# Patient Record
Sex: FEMALE | Race: AMERICAN INDIAN OR ALASKA NATIVE | HISPANIC OR LATINO | URBAN - METROPOLITAN AREA
[De-identification: names, ages, dates, MRNs, and addresses within clinical notes are randomized per-mention and may not be internally consistent; named-entity substitution may affect disease eponyms.]

---

## 2021-05-06 ENCOUNTER — EMERGENCY (EMERGENCY)
Facility: HOSPITAL | Age: 31
LOS: 1 days | Discharge: HOME | End: 2021-05-06
Attending: EMERGENCY MEDICINE | Admitting: EMERGENCY MEDICINE
Payer: SELF-PAY

## 2021-05-06 VITALS — HEART RATE: 100 BPM | RESPIRATION RATE: 18 BRPM | OXYGEN SATURATION: 100 %

## 2021-05-06 VITALS
HEART RATE: 107 BPM | HEIGHT: 60 IN | SYSTOLIC BLOOD PRESSURE: 126 MMHG | RESPIRATION RATE: 18 BRPM | TEMPERATURE: 98 F | OXYGEN SATURATION: 100 % | DIASTOLIC BLOOD PRESSURE: 80 MMHG | WEIGHT: 293 LBS

## 2021-05-06 DIAGNOSIS — M79.661 PAIN IN RIGHT LOWER LEG: ICD-10-CM
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
## 2021-05-06 DIAGNOSIS — M25.551 PAIN IN RIGHT HIP: ICD-10-CM

## 2021-05-06 DIAGNOSIS — S80.11XA CONTUSION OF RIGHT LOWER LEG, INITIAL ENCOUNTER: ICD-10-CM
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
## 2021-05-06 PROCEDURE — 73552 X-RAY EXAM OF FEMUR 2/>: CPT | Mod: 26,RT

## 2021-05-06 PROCEDURE — 73552 X-RAY EXAM OF FEMUR 2/>: CPT | Mod: 26

## 2021-05-06 PROCEDURE — 99283 EMERGENCY DEPT VISIT LOW MDM: CPT | Mod: 25

## 2021-05-06 PROCEDURE — 73552 X-RAY EXAM OF FEMUR 2/>: CPT

## 2021-05-06 RX ORDER — IBUPROFEN 200 MG
600 TABLET ORAL ONCE
Refills: 0 | Status: COMPLETED | OUTPATIENT
Start: 2021-05-06 | End: 2021-05-06

## 2021-05-06 RX ADMIN — Medication 600 MILLIGRAM(S): at 15:37

## 2021-05-06 NOTE — ED PROVIDER NOTE - PHYSICAL EXAMINATION
CONSTITUTIONAL: Awake, alert and in no apparent distress.  HEENT: Head is atraumatic. Eyes clear bilaterally, normal EOMI. Airway patent.  GASTROINTESTINAL: Abdomen soft, non-tender, no guarding, distension.  MUSCULOSKELETAL: Spine appears normal, no midline spinal tenderness, range of motion is not limited, no muscle or joint tenderness. no bony tenderness. no ecchymoses, swelling to RLE  NEUROLOGICAL: Alert, no focal deficits, no motor or sensory deficits.  SKIN: Skin normal color for race, warm, dry and intact. No evidence of rash.  PSYCHIATRIC: Normal mood and affect. no apparent risk to self or others.

## 2021-05-06 NOTE — ED ADULT TRIAGE NOTE - HEIGHT IN INCHES
Detail Level: Detailed
Add 55971 Cpt? (Important Note: In 2017 The Use Of 02325 Is Being Tracked By Cms To Determine Future Global Period Reimbursement For Global Periods): no
0
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
## 2021-05-06 NOTE — ED PROVIDER NOTE - PATIENT PORTAL LINK FT
You can access the FollowMyHealth Patient Portal offered by [facility] by registering at the following website: [email or web address]. By joining [employer]’s FollowMyHealth portal, you will also be able to view your health information using other applications (apps) compatible with our system.
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
## 2021-05-18 ENCOUNTER — NON-APPOINTMENT (OUTPATIENT)
Age: 31
End: 2021-05-18

## 2021-05-18 ENCOUNTER — TRANSCRIPTION ENCOUNTER (OUTPATIENT)
Age: 31
End: 2021-05-18

## 2021-05-18 ENCOUNTER — APPOINTMENT (OUTPATIENT)
Dept: INTERNAL MEDICINE | Facility: CLINIC | Age: 31
End: 2021-05-18
Payer: COMMERCIAL

## 2021-05-18 VITALS
SYSTOLIC BLOOD PRESSURE: 136 MMHG | OXYGEN SATURATION: 100 % | DIASTOLIC BLOOD PRESSURE: 86 MMHG | BODY MASS INDEX: 57.52 KG/M2 | WEIGHT: 293 LBS | RESPIRATION RATE: 14 BRPM | HEART RATE: 81 BPM | TEMPERATURE: 98 F | HEIGHT: 60 IN

## 2021-05-18 DIAGNOSIS — R87.821: ICD-10-CM

## 2021-05-18 DIAGNOSIS — R87.811 VAGINAL HIGH RISK HUMAN PAPILLOMAVIRUS (HPV) DNA TEST POSITIVE: ICD-10-CM

## 2021-05-18 PROCEDURE — 99385 PREV VISIT NEW AGE 18-39: CPT | Mod: 25,GC

## 2021-05-18 PROCEDURE — 36415 COLL VENOUS BLD VENIPUNCTURE: CPT

## 2021-05-18 PROCEDURE — 99072 ADDL SUPL MATRL&STAF TM PHE: CPT

## 2021-05-19 LAB
ALBUMIN SERPL ELPH-MCNC: 4.2 G/DL
ALP BLD-CCNC: 115 U/L
ALT SERPL-CCNC: 24 U/L
ANION GAP SERPL CALC-SCNC: 15 MMOL/L
AST SERPL-CCNC: 19 U/L
BASOPHILS # BLD AUTO: 0.04 K/UL
BASOPHILS NFR BLD AUTO: 0.4 %
BILIRUB SERPL-MCNC: 0.2 MG/DL
BUN SERPL-MCNC: 13 MG/DL
CALCIUM SERPL-MCNC: 9.5 MG/DL
CHLORIDE SERPL-SCNC: 102 MMOL/L
CHOLEST SERPL-MCNC: 150 MG/DL
CO2 SERPL-SCNC: 22 MMOL/L
CREAT SERPL-MCNC: 0.66 MG/DL
EOSINOPHIL # BLD AUTO: 0.19 K/UL
EOSINOPHIL NFR BLD AUTO: 2 %
ESTIMATED AVERAGE GLUCOSE: 123 MG/DL
GLUCOSE SERPL-MCNC: 80 MG/DL
HBA1C MFR BLD HPLC: 5.9 %
HCT VFR BLD CALC: 36.4 %
HDLC SERPL-MCNC: 55 MG/DL
HGB BLD-MCNC: 10.9 G/DL
IMM GRANULOCYTES NFR BLD AUTO: 0.8 %
LDLC SERPL CALC-MCNC: 80 MG/DL
LYMPHOCYTES # BLD AUTO: 2.46 K/UL
LYMPHOCYTES NFR BLD AUTO: 25.9 %
MAN DIFF?: NORMAL
MCHC RBC-ENTMCNC: 24.1 PG
MCHC RBC-ENTMCNC: 29.9 GM/DL
MCV RBC AUTO: 80.4 FL
MONOCYTES # BLD AUTO: 0.44 K/UL
MONOCYTES NFR BLD AUTO: 4.6 %
NEUTROPHILS # BLD AUTO: 6.3 K/UL
NEUTROPHILS NFR BLD AUTO: 66.3 %
NONHDLC SERPL-MCNC: 95 MG/DL
PLATELET # BLD AUTO: 290 K/UL
POTASSIUM SERPL-SCNC: 4.1 MMOL/L
PROT SERPL-MCNC: 7.4 G/DL
RBC # BLD: 4.53 M/UL
RBC # FLD: 17 %
SODIUM SERPL-SCNC: 139 MMOL/L
TRIGL SERPL-MCNC: 76 MG/DL
WBC # FLD AUTO: 9.51 K/UL

## 2021-06-07 ENCOUNTER — TRANSCRIPTION ENCOUNTER (OUTPATIENT)
Age: 31
End: 2021-06-07

## 2021-06-28 ENCOUNTER — APPOINTMENT (OUTPATIENT)
Dept: INTERNAL MEDICINE | Facility: CLINIC | Age: 31
End: 2021-06-28
Payer: COMMERCIAL

## 2021-06-28 VITALS
DIASTOLIC BLOOD PRESSURE: 83 MMHG | TEMPERATURE: 98.4 F | HEART RATE: 81 BPM | OXYGEN SATURATION: 99 % | SYSTOLIC BLOOD PRESSURE: 151 MMHG

## 2021-06-28 VITALS — SYSTOLIC BLOOD PRESSURE: 152 MMHG | DIASTOLIC BLOOD PRESSURE: 80 MMHG

## 2021-06-28 VITALS — BODY MASS INDEX: 63.47 KG/M2 | WEIGHT: 293 LBS

## 2021-06-28 DIAGNOSIS — A08.4 VIRAL INTESTINAL INFECTION, UNSPECIFIED: ICD-10-CM

## 2021-06-28 DIAGNOSIS — H11.30 CONJUNCTIVAL HEMORRHAGE, UNSPECIFIED EYE: ICD-10-CM

## 2021-06-28 PROCEDURE — 99214 OFFICE O/P EST MOD 30 MIN: CPT | Mod: GC

## 2021-06-28 PROCEDURE — 99072 ADDL SUPL MATRL&STAF TM PHE: CPT

## 2021-07-01 ENCOUNTER — APPOINTMENT (OUTPATIENT)
Dept: BARIATRICS | Facility: CLINIC | Age: 31
End: 2021-07-01
Payer: COMMERCIAL

## 2021-07-01 VITALS — BODY MASS INDEX: 57.52 KG/M2 | WEIGHT: 293 LBS | HEIGHT: 60 IN

## 2021-07-01 DIAGNOSIS — R73.03 PREDIABETES.: ICD-10-CM

## 2021-07-01 DIAGNOSIS — R03.0 ELEVATED BLOOD-PRESSURE READING, W/OUT DIAGNOSIS OF HYPERTENSION: ICD-10-CM

## 2021-07-01 PROCEDURE — 99203 OFFICE O/P NEW LOW 30 MIN: CPT | Mod: 95

## 2021-07-02 ENCOUNTER — TRANSCRIPTION ENCOUNTER (OUTPATIENT)
Age: 31
End: 2021-07-02

## 2021-07-08 ENCOUNTER — APPOINTMENT (OUTPATIENT)
Dept: BARIATRICS | Facility: CLINIC | Age: 31
End: 2021-07-08
Payer: COMMERCIAL

## 2021-07-08 VITALS — BODY MASS INDEX: 62.69 KG/M2 | WEIGHT: 293 LBS

## 2021-07-08 DIAGNOSIS — E66.01 MORBID (SEVERE) OBESITY DUE TO EXCESS CALORIES: ICD-10-CM

## 2021-07-08 PROCEDURE — 97802 MEDICAL NUTRITION INDIV IN: CPT | Mod: 95

## 2021-07-09 PROBLEM — R73.03 PREDIABETES: Status: ACTIVE | Noted: 2021-06-28

## 2021-07-09 PROBLEM — R03.0 ELEVATED BLOOD PRESSURE READING: Status: ACTIVE | Noted: 2021-05-18

## 2021-07-09 NOTE — ASSESSMENT
[FreeTextEntry1] : She meets the NIH criteria for bariatric surgery and would like to have a laparoscopic modified duodenal switch. i have reviewed the risks and benefits of the procedure with the patient, and she understands this information fully.

## 2021-07-09 NOTE — HISTORY OF PRESENT ILLNESS
[Home] : at home, [unfilled] , at the time of the visit. [Other Location: e.g. Home (Enter Location, City,State)___] : at [unfilled] [Verbal consent obtained from patient] : the patient, [unfilled] [de-identified] : Patient is a 30 year old female with a long history of morbid obesity not responsive to multiple dietary regimens. She has a BMI of 62.7 and weight-related comorbidities including prediabetes and elevated blood pressure.

## 2021-07-11 ENCOUNTER — FORM ENCOUNTER (OUTPATIENT)
Age: 31
End: 2021-07-11

## 2021-07-13 ENCOUNTER — APPOINTMENT (OUTPATIENT)
Dept: INTERNAL MEDICINE | Facility: CLINIC | Age: 31
End: 2021-07-13

## 2021-07-20 LAB
BASOPHILS # BLD AUTO: 0.04 K/UL
BASOPHILS NFR BLD AUTO: 0.4 %
EOSINOPHIL # BLD AUTO: 0.3 K/UL
EOSINOPHIL NFR BLD AUTO: 2.8 %
HCT VFR BLD CALC: 36.4 %
HGB BLD-MCNC: 11.3 G/DL
IMM GRANULOCYTES NFR BLD AUTO: 0.6 %
LYMPHOCYTES # BLD AUTO: 2.77 K/UL
LYMPHOCYTES NFR BLD AUTO: 25.7 %
MAN DIFF?: NORMAL
MCHC RBC-ENTMCNC: 24.5 PG
MCHC RBC-ENTMCNC: 31 GM/DL
MCV RBC AUTO: 79 FL
MONOCYTES # BLD AUTO: 0.51 K/UL
MONOCYTES NFR BLD AUTO: 4.7 %
NEUTROPHILS # BLD AUTO: 7.1 K/UL
NEUTROPHILS NFR BLD AUTO: 65.8 %
PLATELET # BLD AUTO: 302 K/UL
RBC # BLD: 4.61 M/UL
RBC # FLD: 17.2 %
WBC # FLD AUTO: 10.79 K/UL

## 2021-07-21 LAB
25(OH)D3 SERPL-MCNC: 14.6 NG/ML
ALBUMIN SERPL ELPH-MCNC: 4.4 G/DL
ALP BLD-CCNC: 115 U/L
ALT SERPL-CCNC: 18 U/L
ANION GAP SERPL CALC-SCNC: 12 MMOL/L
AST SERPL-CCNC: 13 U/L
BILIRUB SERPL-MCNC: 0.2 MG/DL
BUN SERPL-MCNC: 11 MG/DL
CALCIUM SERPL-MCNC: 9.5 MG/DL
CHLORIDE SERPL-SCNC: 103 MMOL/L
CO2 SERPL-SCNC: 24 MMOL/L
CREAT SERPL-MCNC: 0.73 MG/DL
ESTIMATED AVERAGE GLUCOSE: 120 MG/DL
FOLATE SERPL-MCNC: 4.7 NG/ML
GLUCOSE SERPL-MCNC: 92 MG/DL
HBA1C MFR BLD HPLC: 5.8 %
IRON SERPL-MCNC: 32 UG/DL
POTASSIUM SERPL-SCNC: 4 MMOL/L
PROT SERPL-MCNC: 7.2 G/DL
SODIUM SERPL-SCNC: 139 MMOL/L
TSH SERPL-ACNC: 3.43 UIU/ML
VIT B12 SERPL-MCNC: 465 PG/ML

## 2021-07-23 LAB — VIT A SERPL-MCNC: 32.4 UG/DL

## 2021-07-24 LAB — VIT B1 SERPL-MCNC: 113.6 NMOL/L

## 2021-07-27 ENCOUNTER — NON-APPOINTMENT (OUTPATIENT)
Age: 31
End: 2021-07-27

## 2021-08-11 ENCOUNTER — APPOINTMENT (OUTPATIENT)
Dept: RADIOLOGY | Facility: HOSPITAL | Age: 31
End: 2021-08-11
Payer: COMMERCIAL

## 2021-08-11 ENCOUNTER — RESULT REVIEW (OUTPATIENT)
Age: 31
End: 2021-08-11

## 2021-08-11 ENCOUNTER — OUTPATIENT (OUTPATIENT)
Dept: OUTPATIENT SERVICES | Facility: HOSPITAL | Age: 31
LOS: 1 days | End: 2021-08-11
Payer: COMMERCIAL

## 2021-08-11 ENCOUNTER — APPOINTMENT (OUTPATIENT)
Dept: BARIATRICS | Facility: CLINIC | Age: 31
End: 2021-08-11
Payer: COMMERCIAL

## 2021-08-11 VITALS
DIASTOLIC BLOOD PRESSURE: 77 MMHG | BODY MASS INDEX: 57.52 KG/M2 | HEART RATE: 98 BPM | SYSTOLIC BLOOD PRESSURE: 132 MMHG | HEIGHT: 60 IN | TEMPERATURE: 97.3 F | WEIGHT: 293 LBS | OXYGEN SATURATION: 98 %

## 2021-08-11 DIAGNOSIS — Z01.818 ENCOUNTER FOR OTHER PREPROCEDURAL EXAMINATION: ICD-10-CM

## 2021-08-11 LAB
ALBUMIN SERPL ELPH-MCNC: 4.3 G/DL — SIGNIFICANT CHANGE UP (ref 3.3–5)
ALP SERPL-CCNC: 104 U/L — SIGNIFICANT CHANGE UP (ref 40–120)
ALT FLD-CCNC: 17 U/L — SIGNIFICANT CHANGE UP (ref 10–45)
ANION GAP SERPL CALC-SCNC: 9 MMOL/L — SIGNIFICANT CHANGE UP (ref 5–17)
APPEARANCE UR: CLEAR — SIGNIFICANT CHANGE UP
APTT BLD: 36.7 SEC — HIGH (ref 27.5–35.5)
AST SERPL-CCNC: 14 U/L — SIGNIFICANT CHANGE UP (ref 10–40)
BACTERIA # UR AUTO: PRESENT /HPF
BASOPHILS # BLD AUTO: 0.03 K/UL — SIGNIFICANT CHANGE UP (ref 0–0.2)
BASOPHILS NFR BLD AUTO: 0.4 % — SIGNIFICANT CHANGE UP (ref 0–2)
BILIRUB SERPL-MCNC: <0.2 MG/DL — SIGNIFICANT CHANGE UP (ref 0.2–1.2)
BILIRUB UR-MCNC: NEGATIVE — SIGNIFICANT CHANGE UP
BUN SERPL-MCNC: 13 MG/DL — SIGNIFICANT CHANGE UP (ref 7–23)
CALCIUM SERPL-MCNC: 9.4 MG/DL — SIGNIFICANT CHANGE UP (ref 8.4–10.5)
CHLORIDE SERPL-SCNC: 103 MMOL/L — SIGNIFICANT CHANGE UP (ref 96–108)
CO2 SERPL-SCNC: 27 MMOL/L — SIGNIFICANT CHANGE UP (ref 22–31)
COLOR SPEC: YELLOW — SIGNIFICANT CHANGE UP
COMMENT - URINE: SIGNIFICANT CHANGE UP
CREAT SERPL-MCNC: 0.77 MG/DL — SIGNIFICANT CHANGE UP (ref 0.5–1.3)
DIFF PNL FLD: ABNORMAL
EOSINOPHIL # BLD AUTO: 0.2 K/UL — SIGNIFICANT CHANGE UP (ref 0–0.5)
EOSINOPHIL NFR BLD AUTO: 2.6 % — SIGNIFICANT CHANGE UP (ref 0–6)
EPI CELLS # UR: ABNORMAL /HPF (ref 0–5)
GLUCOSE SERPL-MCNC: 100 MG/DL — HIGH (ref 70–99)
GLUCOSE UR QL: NEGATIVE — SIGNIFICANT CHANGE UP
HCT VFR BLD CALC: 35.6 % — SIGNIFICANT CHANGE UP (ref 34.5–45)
HGB BLD-MCNC: 11 G/DL — LOW (ref 11.5–15.5)
IMM GRANULOCYTES NFR BLD AUTO: 0.5 % — SIGNIFICANT CHANGE UP (ref 0–1.5)
INR BLD: 1.04 — SIGNIFICANT CHANGE UP (ref 0.88–1.16)
KETONES UR-MCNC: NEGATIVE — SIGNIFICANT CHANGE UP
LEUKOCYTE ESTERASE UR-ACNC: NEGATIVE — SIGNIFICANT CHANGE UP
LYMPHOCYTES # BLD AUTO: 2.03 K/UL — SIGNIFICANT CHANGE UP (ref 1–3.3)
LYMPHOCYTES # BLD AUTO: 26.3 % — SIGNIFICANT CHANGE UP (ref 13–44)
MCHC RBC-ENTMCNC: 24.9 PG — LOW (ref 27–34)
MCHC RBC-ENTMCNC: 30.9 GM/DL — LOW (ref 32–36)
MCV RBC AUTO: 80.5 FL — SIGNIFICANT CHANGE UP (ref 80–100)
MONOCYTES # BLD AUTO: 0.38 K/UL — SIGNIFICANT CHANGE UP (ref 0–0.9)
MONOCYTES NFR BLD AUTO: 4.9 % — SIGNIFICANT CHANGE UP (ref 2–14)
NEUTROPHILS # BLD AUTO: 5.04 K/UL — SIGNIFICANT CHANGE UP (ref 1.8–7.4)
NEUTROPHILS NFR BLD AUTO: 65.3 % — SIGNIFICANT CHANGE UP (ref 43–77)
NITRITE UR-MCNC: NEGATIVE — SIGNIFICANT CHANGE UP
NRBC # BLD: 0 /100 WBCS — SIGNIFICANT CHANGE UP (ref 0–0)
PH UR: 6 — SIGNIFICANT CHANGE UP (ref 5–8)
PLATELET # BLD AUTO: 260 K/UL — SIGNIFICANT CHANGE UP (ref 150–400)
POTASSIUM SERPL-MCNC: 3.9 MMOL/L — SIGNIFICANT CHANGE UP (ref 3.5–5.3)
POTASSIUM SERPL-SCNC: 3.9 MMOL/L — SIGNIFICANT CHANGE UP (ref 3.5–5.3)
PROT SERPL-MCNC: 7.2 G/DL — SIGNIFICANT CHANGE UP (ref 6–8.3)
PROT UR-MCNC: NEGATIVE MG/DL — SIGNIFICANT CHANGE UP
PROTHROM AB SERPL-ACNC: 12.4 SEC — SIGNIFICANT CHANGE UP (ref 10.6–13.6)
RBC # BLD: 4.42 M/UL — SIGNIFICANT CHANGE UP (ref 3.8–5.2)
RBC # FLD: 17.1 % — HIGH (ref 10.3–14.5)
RBC CASTS # UR COMP ASSIST: ABNORMAL /HPF
SODIUM SERPL-SCNC: 139 MMOL/L — SIGNIFICANT CHANGE UP (ref 135–145)
SP GR SPEC: 1.02 — SIGNIFICANT CHANGE UP (ref 1–1.03)
UROBILINOGEN FLD QL: 0.2 E.U./DL — SIGNIFICANT CHANGE UP
WBC # BLD: 7.72 K/UL — SIGNIFICANT CHANGE UP (ref 3.8–10.5)
WBC # FLD AUTO: 7.72 K/UL — SIGNIFICANT CHANGE UP (ref 3.8–10.5)
WBC UR QL: < 5 /HPF — SIGNIFICANT CHANGE UP

## 2021-08-11 PROCEDURE — 85730 THROMBOPLASTIN TIME PARTIAL: CPT

## 2021-08-11 PROCEDURE — 81001 URINALYSIS AUTO W/SCOPE: CPT

## 2021-08-11 PROCEDURE — 80053 COMPREHEN METABOLIC PANEL: CPT

## 2021-08-11 PROCEDURE — 74240 X-RAY XM UPR GI TRC 1CNTRST: CPT | Mod: 26

## 2021-08-11 PROCEDURE — 87086 URINE CULTURE/COLONY COUNT: CPT

## 2021-08-11 PROCEDURE — 85610 PROTHROMBIN TIME: CPT

## 2021-08-11 PROCEDURE — 71046 X-RAY EXAM CHEST 2 VIEWS: CPT

## 2021-08-11 PROCEDURE — 74240 X-RAY XM UPR GI TRC 1CNTRST: CPT

## 2021-08-11 PROCEDURE — 93005 ELECTROCARDIOGRAM TRACING: CPT

## 2021-08-11 PROCEDURE — 99213 OFFICE O/P EST LOW 20 MIN: CPT

## 2021-08-11 PROCEDURE — 71046 X-RAY EXAM CHEST 2 VIEWS: CPT | Mod: 26

## 2021-08-11 PROCEDURE — 93010 ELECTROCARDIOGRAM REPORT: CPT

## 2021-08-11 PROCEDURE — 85025 COMPLETE CBC W/AUTO DIFF WBC: CPT

## 2021-08-11 NOTE — HISTORY OF PRESENT ILLNESS
[de-identified] : Ms. Sosa presents today for a final visit for modified duodenal switch scheduled on 9/7/21. Alternatives, risks and benefits discussed. Explained the importance of following a well balanced diet with protein and vegetables leading up to the day of surgery to ensure a safe procedure. Also discussed a preop diet with our dietitian to have further preop weight loss. All questions answered and preop instructions given. BSTOP, use and rationale of non-opioid medications for pain management explained, patient verbalized understanding.

## 2021-08-11 NOTE — ASSESSMENT
[FreeTextEntry1] : 31 year old female presenting for a final visit for scheduled modified duodenal switch next month. Discussed importance of following a well balanced diet with protein and fiber, also reviewed preop diet with dietitian for further weight loss preoperatively. All questions answered and preop instructions given.

## 2021-08-12 ENCOUNTER — APPOINTMENT (OUTPATIENT)
Dept: INTERNAL MEDICINE | Facility: CLINIC | Age: 31
End: 2021-08-12
Payer: COMMERCIAL

## 2021-08-12 VITALS
OXYGEN SATURATION: 98 % | RESPIRATION RATE: 14 BRPM | HEIGHT: 62.01 IN | SYSTOLIC BLOOD PRESSURE: 135 MMHG | HEART RATE: 71 BPM | WEIGHT: 293 LBS | DIASTOLIC BLOOD PRESSURE: 88 MMHG | TEMPERATURE: 98.5 F | BODY MASS INDEX: 53.24 KG/M2

## 2021-08-12 DIAGNOSIS — E66.01 MORBID (SEVERE) OBESITY DUE TO EXCESS CALORIES: ICD-10-CM

## 2021-08-12 DIAGNOSIS — Z01.818 ENCOUNTER FOR OTHER PREPROCEDURAL EXAMINATION: ICD-10-CM

## 2021-08-12 LAB
CULTURE RESULTS: SIGNIFICANT CHANGE UP
SPECIMEN SOURCE: SIGNIFICANT CHANGE UP

## 2021-08-12 PROCEDURE — 99213 OFFICE O/P EST LOW 20 MIN: CPT

## 2021-08-12 NOTE — ASSESSMENT
[Patient Optimized for Surgery] : Patient optimized for surgery [No Further Testing Recommended] : no further testing recommended [FreeTextEntry4] : 32yo F w/ a PMHx of morbid obesity (BMI 61), preeclampsia (6 year old child), HTN, HPV positive, presents for pre-op optimization for bariatric surgery scheduled on 9/7/21. \par \par #Pre-op Optimization:\par Patient scheduled to have bariatric surgery on 9/7/21. Patient went for requested labs/imaging at [facility] the day prior to office visit (CBC, CMP, PT/INR/PTT, UA, EKG, CXR), which can be viewed through [facility].\par -RCRI 0 points, Class I risk, 3.9% 30-day risk of death, MI, or cardiac arrest \par -Sanders Perioperative Risk 0.1% risk of myocardial infarction or cardiac arrest, intraoperatively or up to 30 days post-op\par -completed requested pre-op paperwork and returned to patient\par -patient optimized for surgery and has no absolute contraindications to planned procedure

## 2021-08-12 NOTE — HISTORY OF PRESENT ILLNESS
[(Patient denies any chest pain, claudication, dyspnea on exertion, orthopnea, palpitations or syncope)] : Patient denies any chest pain, claudication, dyspnea on exertion, orthopnea, palpitations or syncope [Aortic Stenosis] : no aortic stenosis [Atrial Fibrillation] : no atrial fibrillation [Coronary Artery Disease] : no coronary artery disease [Recent Myocardial Infarction] : no recent myocardial infarction [Implantable Device/Pacemaker] : no implantable device/pacemaker [Asthma] : no asthma [COPD] : no COPD [Sleep Apnea] : no sleep apnea [Smoker] : not a smoker [Family Member] : no family member with adverse anesthesia reaction/sudden death [Self] : no previous adverse anesthesia reaction [FreeTextEntry4] : 32yo F w/ a PMHx of morbid obesity (BMI 61), preeclampsia (6 year old child), HTN, HPV positive, presents for pre-op optimization for bariatric surgery scheduled on 9/7/21. Patient went for requested labs/imaging at [facility] the day prior to office visit (CBC, CMP, PT/INR/PTT, UA, EKG, CXR), which can be viewed through [employer]. Patient requesting pre-op paperwork to be completed.

## 2021-08-12 NOTE — END OF VISIT
[FreeTextEntry3] : 32 yo female with hx preDM here for pre op for bariatric surgery\par \par 1) obesity - pre op for bariatric surgery today\par RCRI 0\par >4 mets\par low risk for int risk surgery\par medically optimized. labs and CXR reviewed.

## 2021-09-02 ENCOUNTER — APPOINTMENT (OUTPATIENT)
Dept: INTERNAL MEDICINE | Facility: CLINIC | Age: 31
End: 2021-09-02

## 2021-09-03 VITALS
SYSTOLIC BLOOD PRESSURE: 142 MMHG | DIASTOLIC BLOOD PRESSURE: 87 MMHG | OXYGEN SATURATION: 88 % | HEART RATE: 88 BPM | HEIGHT: 60 IN | WEIGHT: 293 LBS | TEMPERATURE: 98 F | RESPIRATION RATE: 16 BRPM

## 2021-09-03 RX ORDER — INFLUENZA VIRUS VACCINE 15; 15; 15; 15 UG/.5ML; UG/.5ML; UG/.5ML; UG/.5ML
0.5 SUSPENSION INTRAMUSCULAR ONCE
Refills: 0 | Status: DISCONTINUED | OUTPATIENT
Start: 2021-09-07 | End: 2021-09-08

## 2021-09-04 ENCOUNTER — LABORATORY RESULT (OUTPATIENT)
Age: 31
End: 2021-09-04

## 2021-09-06 ENCOUNTER — TRANSCRIPTION ENCOUNTER (OUTPATIENT)
Age: 31
End: 2021-09-06

## 2021-09-07 ENCOUNTER — RESULT REVIEW (OUTPATIENT)
Age: 31
End: 2021-09-07

## 2021-09-07 ENCOUNTER — APPOINTMENT (OUTPATIENT)
Dept: BARIATRICS | Facility: HOSPITAL | Age: 31
End: 2021-09-07

## 2021-09-07 ENCOUNTER — INPATIENT (INPATIENT)
Facility: HOSPITAL | Age: 31
LOS: 0 days | Discharge: HOME | DRG: 621 | End: 2021-09-08
Attending: SURGERY | Admitting: SURGERY
Payer: COMMERCIAL

## 2021-09-07 DIAGNOSIS — Z98.891 HISTORY OF UTERINE SCAR FROM PREVIOUS SURGERY: Chronic | ICD-10-CM

## 2021-09-07 LAB
BLD GP AB SCN SERPL QL: NEGATIVE — SIGNIFICANT CHANGE UP
HCT VFR BLD CALC: 35.3 % — SIGNIFICANT CHANGE UP (ref 34.5–45)
HGB BLD-MCNC: 10.8 G/DL — LOW (ref 11.5–15.5)
MCHC RBC-ENTMCNC: 24.2 PG — LOW (ref 27–34)
MCHC RBC-ENTMCNC: 30.6 GM/DL — LOW (ref 32–36)
MCV RBC AUTO: 79 FL — LOW (ref 80–100)
NRBC # BLD: 0 /100 WBCS — SIGNIFICANT CHANGE UP (ref 0–0)
PLATELET # BLD AUTO: 272 K/UL — SIGNIFICANT CHANGE UP (ref 150–400)
RBC # BLD: 4.47 M/UL — SIGNIFICANT CHANGE UP (ref 3.8–5.2)
RBC # FLD: 16.7 % — HIGH (ref 10.3–14.5)
RH IG SCN BLD-IMP: POSITIVE — SIGNIFICANT CHANGE UP
WBC # BLD: 11.12 K/UL — HIGH (ref 3.8–10.5)
WBC # FLD AUTO: 11.12 K/UL — HIGH (ref 3.8–10.5)

## 2021-09-07 PROCEDURE — 88307 TISSUE EXAM BY PATHOLOGIST: CPT | Mod: 26

## 2021-09-07 PROCEDURE — 43845 GASTROPLASTY DUODENAL SWITCH: CPT | Mod: GC

## 2021-09-07 RX ORDER — HYDROMORPHONE HYDROCHLORIDE 2 MG/ML
0.25 INJECTION INTRAMUSCULAR; INTRAVENOUS; SUBCUTANEOUS ONCE
Refills: 0 | Status: DISCONTINUED | OUTPATIENT
Start: 2021-09-07 | End: 2021-09-07

## 2021-09-07 RX ORDER — SODIUM CHLORIDE 9 MG/ML
1000 INJECTION, SOLUTION INTRAVENOUS
Refills: 0 | Status: DISCONTINUED | OUTPATIENT
Start: 2021-09-07 | End: 2021-09-08

## 2021-09-07 RX ORDER — ACETAMINOPHEN 500 MG
650 TABLET ORAL EVERY 6 HOURS
Refills: 0 | Status: DISCONTINUED | OUTPATIENT
Start: 2021-09-07 | End: 2021-09-08

## 2021-09-07 RX ORDER — KETOROLAC TROMETHAMINE 30 MG/ML
15 SYRINGE (ML) INJECTION EVERY 6 HOURS
Refills: 0 | Status: DISCONTINUED | OUTPATIENT
Start: 2021-09-07 | End: 2021-09-08

## 2021-09-07 RX ORDER — HYDROMORPHONE HYDROCHLORIDE 2 MG/ML
0.5 INJECTION INTRAMUSCULAR; INTRAVENOUS; SUBCUTANEOUS ONCE
Refills: 0 | Status: DISCONTINUED | OUTPATIENT
Start: 2021-09-07 | End: 2021-09-07

## 2021-09-07 RX ORDER — APREPITANT 80 MG/1
40 CAPSULE ORAL ONCE
Refills: 0 | Status: COMPLETED | OUTPATIENT
Start: 2021-09-07 | End: 2021-09-07

## 2021-09-07 RX ORDER — GABAPENTIN 400 MG/1
300 CAPSULE ORAL ONCE
Refills: 0 | Status: COMPLETED | OUTPATIENT
Start: 2021-09-07 | End: 2021-09-07

## 2021-09-07 RX ORDER — SCOPALAMINE 1 MG/3D
1 PATCH, EXTENDED RELEASE TRANSDERMAL ONCE
Refills: 0 | Status: COMPLETED | OUTPATIENT
Start: 2021-09-07 | End: 2021-09-07

## 2021-09-07 RX ORDER — BUPIVACAINE 13.3 MG/ML
20 INJECTION, SUSPENSION, LIPOSOMAL INFILTRATION ONCE
Refills: 0 | Status: DISCONTINUED | OUTPATIENT
Start: 2021-09-07 | End: 2021-09-07

## 2021-09-07 RX ORDER — ACETAMINOPHEN 500 MG
1000 TABLET ORAL ONCE
Refills: 0 | Status: DISCONTINUED | OUTPATIENT
Start: 2021-09-07 | End: 2021-09-08

## 2021-09-07 RX ORDER — HYDROMORPHONE HYDROCHLORIDE 2 MG/ML
0.25 INJECTION INTRAMUSCULAR; INTRAVENOUS; SUBCUTANEOUS
Refills: 0 | Status: DISCONTINUED | OUTPATIENT
Start: 2021-09-07 | End: 2021-09-07

## 2021-09-07 RX ORDER — ONDANSETRON 8 MG/1
4 TABLET, FILM COATED ORAL EVERY 6 HOURS
Refills: 0 | Status: DISCONTINUED | OUTPATIENT
Start: 2021-09-07 | End: 2021-09-08

## 2021-09-07 RX ORDER — ACETAMINOPHEN 500 MG
1000 TABLET ORAL ONCE
Refills: 0 | Status: COMPLETED | OUTPATIENT
Start: 2021-09-07 | End: 2021-09-07

## 2021-09-07 RX ORDER — PANTOPRAZOLE SODIUM 20 MG/1
40 TABLET, DELAYED RELEASE ORAL DAILY
Refills: 0 | Status: DISCONTINUED | OUTPATIENT
Start: 2021-09-07 | End: 2021-09-08

## 2021-09-07 RX ORDER — ENOXAPARIN SODIUM 100 MG/ML
40 INJECTION SUBCUTANEOUS ONCE
Refills: 0 | Status: COMPLETED | OUTPATIENT
Start: 2021-09-07 | End: 2021-09-07

## 2021-09-07 RX ADMIN — ONDANSETRON 4 MILLIGRAM(S): 8 TABLET, FILM COATED ORAL at 15:34

## 2021-09-07 RX ADMIN — SCOPALAMINE 1 PATCH: 1 PATCH, EXTENDED RELEASE TRANSDERMAL at 18:54

## 2021-09-07 RX ADMIN — Medication 650 MILLIGRAM(S): at 16:00

## 2021-09-07 RX ADMIN — HYDROMORPHONE HYDROCHLORIDE 0.5 MILLIGRAM(S): 2 INJECTION INTRAMUSCULAR; INTRAVENOUS; SUBCUTANEOUS at 22:44

## 2021-09-07 RX ADMIN — HYDROMORPHONE HYDROCHLORIDE 0.25 MILLIGRAM(S): 2 INJECTION INTRAMUSCULAR; INTRAVENOUS; SUBCUTANEOUS at 20:51

## 2021-09-07 RX ADMIN — HYDROMORPHONE HYDROCHLORIDE 0.5 MILLIGRAM(S): 2 INJECTION INTRAMUSCULAR; INTRAVENOUS; SUBCUTANEOUS at 22:59

## 2021-09-07 RX ADMIN — PANTOPRAZOLE SODIUM 40 MILLIGRAM(S): 20 TABLET, DELAYED RELEASE ORAL at 13:33

## 2021-09-07 RX ADMIN — HYDROMORPHONE HYDROCHLORIDE 0.5 MILLIGRAM(S): 2 INJECTION INTRAMUSCULAR; INTRAVENOUS; SUBCUTANEOUS at 17:13

## 2021-09-07 RX ADMIN — HYDROMORPHONE HYDROCHLORIDE 0.25 MILLIGRAM(S): 2 INJECTION INTRAMUSCULAR; INTRAVENOUS; SUBCUTANEOUS at 16:35

## 2021-09-07 RX ADMIN — ENOXAPARIN SODIUM 40 MILLIGRAM(S): 100 INJECTION SUBCUTANEOUS at 07:02

## 2021-09-07 RX ADMIN — SCOPALAMINE 1 PATCH: 1 PATCH, EXTENDED RELEASE TRANSDERMAL at 07:03

## 2021-09-07 RX ADMIN — Medication 650 MILLIGRAM(S): at 15:34

## 2021-09-07 RX ADMIN — Medication 1000 MILLIGRAM(S): at 07:04

## 2021-09-07 RX ADMIN — HYDROMORPHONE HYDROCHLORIDE 0.25 MILLIGRAM(S): 2 INJECTION INTRAMUSCULAR; INTRAVENOUS; SUBCUTANEOUS at 16:04

## 2021-09-07 RX ADMIN — HYDROMORPHONE HYDROCHLORIDE 0.25 MILLIGRAM(S): 2 INJECTION INTRAMUSCULAR; INTRAVENOUS; SUBCUTANEOUS at 20:36

## 2021-09-07 RX ADMIN — GABAPENTIN 300 MILLIGRAM(S): 400 CAPSULE ORAL at 07:03

## 2021-09-07 RX ADMIN — HYDROMORPHONE HYDROCHLORIDE 0.25 MILLIGRAM(S): 2 INJECTION INTRAMUSCULAR; INTRAVENOUS; SUBCUTANEOUS at 12:13

## 2021-09-07 RX ADMIN — ONDANSETRON 4 MILLIGRAM(S): 8 TABLET, FILM COATED ORAL at 22:44

## 2021-09-07 RX ADMIN — APREPITANT 40 MILLIGRAM(S): 80 CAPSULE ORAL at 07:03

## 2021-09-07 RX ADMIN — HYDROMORPHONE HYDROCHLORIDE 0.25 MILLIGRAM(S): 2 INJECTION INTRAMUSCULAR; INTRAVENOUS; SUBCUTANEOUS at 11:57

## 2021-09-07 RX ADMIN — HYDROMORPHONE HYDROCHLORIDE 0.5 MILLIGRAM(S): 2 INJECTION INTRAMUSCULAR; INTRAVENOUS; SUBCUTANEOUS at 17:28

## 2021-09-07 NOTE — PACU DISCHARGE NOTE - COMMENTS
Patient currently stable, VSS. Patient pain managed. Denies N/V. Cleared by anesthesiologist to be transferred to [address] [place] [address].

## 2021-09-07 NOTE — H&P ADULT - ASSESSMENT
Pt is a 32 y/o F w/ PMH of morbid obesity, HTN, prediabetes that presents today for a robotic assisted duodenal switch.   - to OR

## 2021-09-07 NOTE — PROGRESS NOTE ADULT - SUBJECTIVE AND OBJECTIVE BOX
POC@14:00  POST-OPERATIVE NOTE    Procedure: Robot-assisted duodenal switch     Diagnosis/Indication: Morbid obesity    Surgeon: Dr. Avendaño    S: Pt seen and examined at bedside. Pt c/o abdominal pain and nausea. Denies CP, SOB, SALES, or vomiting. Pt voided 400cc postoperatively. Pain controlled with medication.     O:  T(C): 36.9 (09-07-21 @ 13:58), Max: 37.4 (09-07-21 @ 13:36)  T(F): 98.4 (09-07-21 @ 13:58), Max: 99.3 (09-07-21 @ 13:36)  HR: 83 (09-07-21 @ 13:58) (82 - 92)  BP: 156/90 (09-07-21 @ 13:58) (125/64 - 156/90)  RR: 18 (09-07-21 @ 13:58) (18 - 19)  SpO2: 94% (09-07-21 @ 13:58) (91% - 95%)  Wt(kg): --            Gen: AAOx3, NAD, resting comfortably in bed  C/V: NSR  Pulm: Nonlabored breathing, no respiratory distress  Abd: soft, nondistended, uniform TTP in all quadrants, incisions C/D/I, no rebound, no guarding  Extrem: WWP, no calf edema or tenderness, SCDs in place      A/P: 31y Female s/p above procedure  Diet: BCLD  IVF:  Pain/nausea control  SQH/SCDs/OOBA/IS  Dispo pending pain control, PO tolerance, clinical improvement

## 2021-09-07 NOTE — H&P ADULT - HISTORY OF PRESENT ILLNESS
Pt is a 30 y/o F w/ PMH of morbid obesity, HTN, prediabetes that presents today for a robotic assisted duodenal switch.      pre op h/h:  Cr:  Pt is a 32 y/o F w/ PMH of morbid obesity, HTN, prediabetes that presents today for a robotic assisted duodenal switch.      pre op h/h: 11.0/35.6  Cr: .77

## 2021-09-08 ENCOUNTER — TRANSCRIPTION ENCOUNTER (OUTPATIENT)
Age: 31
End: 2021-09-08

## 2021-09-08 VITALS — WEIGHT: 293 LBS

## 2021-09-08 LAB
ANION GAP SERPL CALC-SCNC: 13 MMOL/L — SIGNIFICANT CHANGE UP (ref 5–17)
BUN SERPL-MCNC: 6 MG/DL — LOW (ref 7–23)
CALCIUM SERPL-MCNC: 9.4 MG/DL — SIGNIFICANT CHANGE UP (ref 8.4–10.5)
CHLORIDE SERPL-SCNC: 106 MMOL/L — SIGNIFICANT CHANGE UP (ref 96–108)
CO2 SERPL-SCNC: 22 MMOL/L — SIGNIFICANT CHANGE UP (ref 22–31)
COVID-19 SPIKE DOMAIN AB INTERP: POSITIVE
COVID-19 SPIKE DOMAIN ANTIBODY RESULT: >250 U/ML — HIGH
CREAT SERPL-MCNC: 0.77 MG/DL — SIGNIFICANT CHANGE UP (ref 0.5–1.3)
GLUCOSE SERPL-MCNC: 114 MG/DL — HIGH (ref 70–99)
HCT VFR BLD CALC: 32.1 % — LOW (ref 34.5–45)
HGB BLD-MCNC: 10 G/DL — LOW (ref 11.5–15.5)
MAGNESIUM SERPL-MCNC: 1.8 MG/DL — SIGNIFICANT CHANGE UP (ref 1.6–2.6)
MCHC RBC-ENTMCNC: 24.8 PG — LOW (ref 27–34)
MCHC RBC-ENTMCNC: 31.2 GM/DL — LOW (ref 32–36)
MCV RBC AUTO: 79.5 FL — LOW (ref 80–100)
NRBC # BLD: 0 /100 WBCS — SIGNIFICANT CHANGE UP (ref 0–0)
PHOSPHATE SERPL-MCNC: 2.7 MG/DL — SIGNIFICANT CHANGE UP (ref 2.5–4.5)
PLATELET # BLD AUTO: 262 K/UL — SIGNIFICANT CHANGE UP (ref 150–400)
POTASSIUM SERPL-MCNC: 3.8 MMOL/L — SIGNIFICANT CHANGE UP (ref 3.5–5.3)
POTASSIUM SERPL-SCNC: 3.8 MMOL/L — SIGNIFICANT CHANGE UP (ref 3.5–5.3)
RBC # BLD: 4.04 M/UL — SIGNIFICANT CHANGE UP (ref 3.8–5.2)
RBC # FLD: 16.9 % — HIGH (ref 10.3–14.5)
SARS-COV-2 IGG+IGM SERPL QL IA: >250 U/ML — HIGH
SARS-COV-2 IGG+IGM SERPL QL IA: POSITIVE
SODIUM SERPL-SCNC: 141 MMOL/L — SIGNIFICANT CHANGE UP (ref 135–145)
WBC # BLD: 11.08 K/UL — HIGH (ref 3.8–10.5)
WBC # FLD AUTO: 11.08 K/UL — HIGH (ref 3.8–10.5)

## 2021-09-08 RX ORDER — MAGNESIUM SULFATE 500 MG/ML
2 VIAL (ML) INJECTION ONCE
Refills: 0 | Status: COMPLETED | OUTPATIENT
Start: 2021-09-08 | End: 2021-09-08

## 2021-09-08 RX ORDER — POTASSIUM CHLORIDE 20 MEQ
20 PACKET (EA) ORAL ONCE
Refills: 0 | Status: COMPLETED | OUTPATIENT
Start: 2021-09-08 | End: 2021-09-08

## 2021-09-08 RX ORDER — OMEPRAZOLE 10 MG/1
1 CAPSULE, DELAYED RELEASE ORAL
Qty: 30 | Refills: 0
Start: 2021-09-08 | End: 2021-10-07

## 2021-09-08 RX ORDER — SIMETHICONE 80 MG/1
80 TABLET, CHEWABLE ORAL ONCE
Refills: 0 | Status: DISCONTINUED | OUTPATIENT
Start: 2021-09-08 | End: 2021-09-08

## 2021-09-08 RX ORDER — CHOLECALCIFEROL (VITAMIN D3) 125 MCG
1 CAPSULE ORAL
Qty: 0 | Refills: 0 | DISCHARGE

## 2021-09-08 RX ORDER — ACETAMINOPHEN 500 MG
2 TABLET ORAL
Qty: 56 | Refills: 0
Start: 2021-09-08 | End: 2021-09-14

## 2021-09-08 RX ORDER — HEPARIN SODIUM 5000 [USP'U]/ML
5000 INJECTION INTRAVENOUS; SUBCUTANEOUS EVERY 8 HOURS
Refills: 0 | Status: DISCONTINUED | OUTPATIENT
Start: 2021-09-08 | End: 2021-09-08

## 2021-09-08 RX ORDER — APIXABAN 2.5 MG/1
1 TABLET, FILM COATED ORAL
Qty: 60 | Refills: 0
Start: 2021-09-08 | End: 2021-10-07

## 2021-09-08 RX ORDER — HEPARIN SODIUM 5000 [USP'U]/ML
7500 INJECTION INTRAVENOUS; SUBCUTANEOUS EVERY 8 HOURS
Refills: 0 | Status: DISCONTINUED | OUTPATIENT
Start: 2021-09-08 | End: 2021-09-08

## 2021-09-08 RX ADMIN — Medication 15 MILLIGRAM(S): at 01:14

## 2021-09-08 RX ADMIN — PANTOPRAZOLE SODIUM 40 MILLIGRAM(S): 20 TABLET, DELAYED RELEASE ORAL at 11:11

## 2021-09-08 RX ADMIN — Medication 15 MILLIGRAM(S): at 00:59

## 2021-09-08 RX ADMIN — HEPARIN SODIUM 7500 UNIT(S): 5000 INJECTION INTRAVENOUS; SUBCUTANEOUS at 09:18

## 2021-09-08 RX ADMIN — Medication 20 MILLIEQUIVALENT(S): at 09:18

## 2021-09-08 RX ADMIN — Medication 15 MILLIGRAM(S): at 06:46

## 2021-09-08 RX ADMIN — Medication 15 MILLIGRAM(S): at 06:31

## 2021-09-08 RX ADMIN — Medication 50 GRAM(S): at 09:18

## 2021-09-08 RX ADMIN — SCOPALAMINE 1 PATCH: 1 PATCH, EXTENDED RELEASE TRANSDERMAL at 07:27

## 2021-09-08 NOTE — DISCHARGE NOTE NURSING/CASE MANAGEMENT/SOCIAL WORK - NSDCPEELIQUISREACT_GEN_ALL_CORE
Apixaban/Eliquis increases your risk for bleeding. Notify your doctor if you experience any of the following side effects: bleeding, coughing or vomiting blood, red or black stool, unexpected pain or swelling, itching or hives, chest pain, chest tightness, trouble breathing, changes in how much or how often you urinate, red or pink urine, numbness or tingling in your feet, or unusual muscle weakness. When Apixaban/Eliquis is taken with other medicines, they can affect how it works. Taking other medications such as aspirin, blood thinners, nonsteroidal anti-inflammatories, and medications that treat depression can increase your risk of bleeding. It is very important to tell your health care provider about all of the other medicines, including over-the-counter medications, herbs, and vitamins you are taking. DO NOT start, stop, or change the dosage of any medicine, including over-the-counter medicines, vitamins, and herbal products without your doctor’s approval. Any products containing aspirin or are nonsteroidal anti-inflammatories lessen the blood’s ability to form clots and add to the effect of Apixaban/Eliquis. Never take aspirin or medicines that contain aspirin without speaking to your doctor. Other:

## 2021-09-08 NOTE — PROGRESS NOTE ADULT - SUBJECTIVE AND OBJECTIVE BOX
INTERVAL HPI/OVERNIGHT EVENTS: pain controlled, post op hgb: 10.8 (11.0), dilaudid 0.25mg x 1 for "throbbing pain," dilaudid 0.5mg x1 after walking, had an episode of vasovagal during her walk, pt put in wheelie chair and brought back to bed, vitals stable at that time, good urine o/p, vss    STATUS POST:  robotic assisted modified duodenal switch    POST OPERATIVE DAY #: 1    SUBJECTIVE:  pt seen at bedside, feeling okay. denies nausea; admits to some abdominal pain, tolerating sips of water, however pain increased with intake    MEDICATIONS  (STANDING):  acetaminophen  IVPB .. 1000 milliGRAM(s) IV Intermittent once  influenza   Vaccine 0.5 milliLiter(s) IntraMuscular once  ketorolac   Injectable 15 milliGRAM(s) IV Push every 6 hours  lactated ringers. 1000 milliLiter(s) (190 mL/Hr) IV Continuous <Continuous>  pantoprazole  Injectable 40 milliGRAM(s) IV Push daily    MEDICATIONS  (PRN):  acetaminophen    Suspension .. 650 milliGRAM(s) Oral every 6 hours PRN Moderate Pain (4 - 6)  ondansetron Injectable 4 milliGRAM(s) IV Push every 6 hours PRN Nausea      Vital Signs Last 24 Hrs  T(C): 36.8 (08 Sep 2021 06:09), Max: 37.4 (07 Sep 2021 13:36)  T(F): 98.2 (08 Sep 2021 06:09), Max: 99.3 (07 Sep 2021 13:36)  HR: 69 (08 Sep 2021 06:09) (69 - 92)  BP: 143/85 (08 Sep 2021 06:09) (125/64 - 166/78)  BP(mean): 106 (08 Sep 2021 00:00) (89 - 112)  RR: 17 (08 Sep 2021 06:09) (17 - 19)  SpO2: 96% (08 Sep 2021 06:09) (91% - 96%)    PHYSICAL EXAM:      Constitutional: A&Ox3    Respiratory: non labored breathing, no respiratory distress    Cardiovascular: NSR, RRR    Gastrointestinal: soft, nondistended, appropriate incisional tenderness, incisions c/d/i    Extremities: (-) edema                  I&O's Detail    07 Sep 2021 07:01  -  08 Sep 2021 07:00  --------------------------------------------------------  IN:    Lactated Ringers: 2660 mL    Oral Fluid: 180 mL  Total IN: 2840 mL    OUT:    Voided (mL): 1500 mL  Total OUT: 1500 mL    Total NET: 1340 mL          LABS:                        10.0   11.08 )-----------( 262      ( 08 Sep 2021 06:45 )             32.1                 RADIOLOGY & ADDITIONAL STUDIES:

## 2021-09-08 NOTE — DISCHARGE NOTE PROVIDER - NSDCMRMEDTOKEN_GEN_ALL_CORE_FT
Vitamin D3 1250 mcg (50,000 intl units) oral capsule: 1 cap(s) orally once a week   acetaminophen 325 mg oral tablet: 2 tab(s) orally every 6 hours, As Needed -for moderate pain MDD:4000mg   Eliquis 2.5 mg oral tablet: 1 tab(s) orally 2 times a day MDD:2 START ON 9/10/21  omeprazole 40 mg oral delayed release capsule: 1 cap(s) orally once a day MDD:1

## 2021-09-08 NOTE — DIETITIAN INITIAL EVALUATION ADULT. - OTHER CALCULATIONS
IBW for needs d/t % IBW >120. Current needs x 2 weeks then transition to maintenance needs of 906-1132 (20-25kcal/kg of IBW)

## 2021-09-08 NOTE — DISCHARGE NOTE NURSING/CASE MANAGEMENT/SOCIAL WORK - PATIENT PORTAL LINK FT
You can access the FollowMyHealth Patient Portal offered by [facility] by registering at the following website: [email or web address]. By joining [employer]’s FollowMyHealth portal, you will also be able to view your health information using other applications (apps) compatible with our system.

## 2021-09-08 NOTE — DISCHARGE NOTE PROVIDER - NSDCFUSCHEDAPPT_GEN_ALL_CORE_FT
SHALOM SANCHEZ ; 09/10/2021 ; NPP Med GenInt [address]   SHALOM SANCHEZ ; 09/23/2021 ; NPP Surg Bariatric [address]

## 2021-09-08 NOTE — DISCHARGE NOTE PROVIDER - NSDCFUADDINST_GEN_ALL_CORE_FT
Follow up with  in 1 week. Call the office at  to schedule your appointment.  You may shower; soap and water over incision sites. Do not scrub. Pat dry when done. No tub bathing or swimming until cleared. Keep incision sites out of the sun as scars will darken. No heavy lifting (>10lbs) or strenuous exercise. Diet: Bariatric Full Fluids. 60 grams protein daily.  64 fluid ounces water daily. Drink small sips throughout the day. Continue diet as outlined by paperwork received as a pre-operative patient. You should be urinating at least 3-4x per day. Call the office if you experience increasing abdominal pain, nausea, vomiting, or temperature >100.4F.  NO ASPIRIN OR NSAIDs until approved by Dr. Avendaño. Avoid alcoholic beverages until cleared by Dr. Avendaño.  1) Please take Tylenol 650 mg every 4 to 6 hours by mouth for moderate pain control. Please do not exceed over 4,000 mg of Tylenol a day.  2) Please start taking Eliquis 2.5 mg by mouth twice a day starting 3 days after surgery 9/10/21.  3) Please take Omeprazole 40 mg once a day by mouth.

## 2021-09-08 NOTE — DISCHARGE NOTE PROVIDER - NSDCCPCAREPLAN_GEN_ALL_CORE_FT
PRINCIPAL DISCHARGE DIAGNOSIS  Diagnosis: Morbid obesity  Assessment and Plan of Treatment: s/p robotic assisted modified duodenal switch

## 2021-09-08 NOTE — DISCHARGE NOTE PROVIDER - CARE PROVIDER_API CALL
Zafar Avendaño (MD)  Surgery  [address]  [place], [place] [address]  Phone: [phone]  Fax: [phone]  Follow Up Time: 1 week

## 2021-09-08 NOTE — DIETITIAN INITIAL EVALUATION ADULT. - OTHER INFO
Pt admitted for elective duodenal switch on 9/07, currently POD 1. On bariatric CLD. Spoke with pt in room this morning, has consumed 12oz of water and 6 spoonfuls of broth. Reports nausea and gas pain. Ambulating in pro. Pt is meeting goal fluid intake of 4oz every hour.     Skin: lap sites noted, no breakdown   GI: abd-soft/ obese    Reviewed bariatric diet. Discussed CLD and progression to FLD on POD 2 with start of protein shakes. Goal of  grams protein per day and 64oz fluid  (primarily water) per day. Discussed drinking 4oz every hour. Also discussed vitamin/mineral supplements required. Informed pt will follow with outpatient dietitian to assist with diet progression. Provided duodenal switch diet handout. Answered pt's questions and verbalized understanding.

## 2021-09-08 NOTE — PROGRESS NOTE ADULT - ASSESSMENT
Pt is a 32 y/o F w/ PMH of morbid obesity, HTN, prediabetes that presents today for a robotic assisted duodenal switch. Now s/p RA DS performed on 9/7.    Pain/nausea contol prn  BCLD, IVF LR @ 190cc/hr  Protonix  SQH POD 1 for DVT ppx  SCDs, OOBA, IS  AM labs  Nutritional consult in am  encourage PO intake  encourage ambulation  possible dc this afternoon

## 2021-09-08 NOTE — DISCHARGE NOTE PROVIDER - HOSPITAL COURSE
Pt is a 32 y/o F w/ PMH of morbid obesity, BMI 64, HTN, prediabetes that presented on day of admission for a robotic assisted duodenal switch.  Post operatively the patient was admitted to the surgical floor for further management and monitoring. Her postoperative course was unremarkable with advancement of diet, passing trial of void, and pain control. On day of discharge patient was stable to be d/c'd home.

## 2021-09-10 ENCOUNTER — APPOINTMENT (OUTPATIENT)
Dept: INTERNAL MEDICINE | Facility: CLINIC | Age: 31
End: 2021-09-10

## 2021-09-10 ENCOUNTER — NON-APPOINTMENT (OUTPATIENT)
Age: 31
End: 2021-09-10

## 2021-09-10 DIAGNOSIS — R73.03 PREDIABETES: ICD-10-CM

## 2021-09-10 DIAGNOSIS — E66.01 MORBID (SEVERE) OBESITY DUE TO EXCESS CALORIES: ICD-10-CM

## 2021-09-10 DIAGNOSIS — K76.0 FATTY (CHANGE OF) LIVER, NOT ELSEWHERE CLASSIFIED: ICD-10-CM

## 2021-09-10 DIAGNOSIS — I10 ESSENTIAL (PRIMARY) HYPERTENSION: ICD-10-CM

## 2021-09-10 LAB — SURGICAL PATHOLOGY STUDY: SIGNIFICANT CHANGE UP

## 2021-09-16 ENCOUNTER — APPOINTMENT (OUTPATIENT)
Dept: INTERNAL MEDICINE | Facility: CLINIC | Age: 31
End: 2021-09-16
Payer: COMMERCIAL

## 2021-09-16 VITALS
HEIGHT: 62 IN | WEIGHT: 293 LBS | OXYGEN SATURATION: 100 % | HEART RATE: 89 BPM | TEMPERATURE: 98 F | BODY MASS INDEX: 53.92 KG/M2 | RESPIRATION RATE: 14 BRPM

## 2021-09-16 DIAGNOSIS — Z23 ENCOUNTER FOR IMMUNIZATION: ICD-10-CM

## 2021-09-16 PROBLEM — R73.03 PREDIABETES: Chronic | Status: ACTIVE | Noted: 2021-09-03

## 2021-09-16 PROBLEM — E66.01 MORBID (SEVERE) OBESITY DUE TO EXCESS CALORIES: Chronic | Status: ACTIVE | Noted: 2021-09-03

## 2021-09-16 PROCEDURE — 36415 COLL VENOUS BLD VENIPUNCTURE: CPT

## 2021-09-16 PROCEDURE — 99213 OFFICE O/P EST LOW 20 MIN: CPT | Mod: GC,25

## 2021-09-17 ENCOUNTER — APPOINTMENT (OUTPATIENT)
Dept: INTERNAL MEDICINE | Facility: CLINIC | Age: 31
End: 2021-09-17

## 2021-09-17 PROBLEM — Z23 NEED FOR VACCINATION: Status: ACTIVE | Noted: 2021-09-17

## 2021-09-17 LAB — HBV SURFACE AB SER QL: REACTIVE

## 2021-09-21 ENCOUNTER — APPOINTMENT (OUTPATIENT)
Dept: INTERNAL MEDICINE | Facility: CLINIC | Age: 31
End: 2021-09-21

## 2021-09-23 ENCOUNTER — APPOINTMENT (OUTPATIENT)
Dept: BARIATRICS | Facility: CLINIC | Age: 31
End: 2021-09-23
Payer: COMMERCIAL

## 2021-09-23 VITALS — BODY MASS INDEX: 55.05 KG/M2 | WEIGHT: 293 LBS

## 2021-09-23 PROCEDURE — 99024 POSTOP FOLLOW-UP VISIT: CPT

## 2021-09-27 ENCOUNTER — APPOINTMENT (OUTPATIENT)
Dept: INTERNAL MEDICINE | Facility: CLINIC | Age: 31
End: 2021-09-27
Payer: SELF-PAY

## 2021-09-27 PROCEDURE — PCNS1: CPT

## 2021-10-11 ENCOUNTER — APPOINTMENT (OUTPATIENT)
Dept: INTERNAL MEDICINE | Facility: CLINIC | Age: 31
End: 2021-10-11

## 2021-10-13 ENCOUNTER — NON-APPOINTMENT (OUTPATIENT)
Age: 31
End: 2021-10-13

## 2021-10-18 ENCOUNTER — APPOINTMENT (OUTPATIENT)
Dept: INTERNAL MEDICINE | Facility: CLINIC | Age: 31
End: 2021-10-18
Payer: COMMERCIAL

## 2021-10-18 VITALS
HEIGHT: 60 IN | OXYGEN SATURATION: 100 % | HEART RATE: 69 BPM | TEMPERATURE: 97.9 F | WEIGHT: 288 LBS | BODY MASS INDEX: 56.54 KG/M2

## 2021-10-18 VITALS — DIASTOLIC BLOOD PRESSURE: 82 MMHG | SYSTOLIC BLOOD PRESSURE: 128 MMHG

## 2021-10-18 DIAGNOSIS — Z02.1 ENCOUNTER FOR PRE-EMPLOYMENT EXAMINATION: ICD-10-CM

## 2021-10-18 PROCEDURE — 99214 OFFICE O/P EST MOD 30 MIN: CPT | Mod: 25,GC

## 2021-10-18 PROCEDURE — 36415 COLL VENOUS BLD VENIPUNCTURE: CPT

## 2021-10-20 PROBLEM — Z02.1 PRE-EMPLOYMENT EXAMINATION: Status: ACTIVE | Noted: 2021-09-16

## 2021-10-21 LAB
M TB IFN-G BLD-IMP: NEGATIVE
QUANTIFERON TB PLUS MITOGEN MINUS NIL: >10 IU/ML
QUANTIFERON TB PLUS NIL: 0.01 IU/ML
QUANTIFERON TB PLUS TB1 MINUS NIL: 0.01 IU/ML
QUANTIFERON TB PLUS TB2 MINUS NIL: 0.02 IU/ML

## 2021-10-21 PROCEDURE — 86850 RBC ANTIBODY SCREEN: CPT

## 2021-10-21 PROCEDURE — 84100 ASSAY OF PHOSPHORUS: CPT

## 2021-10-21 PROCEDURE — C1769: CPT

## 2021-10-21 PROCEDURE — S2900: CPT

## 2021-10-21 PROCEDURE — 80048 BASIC METABOLIC PNL TOTAL CA: CPT

## 2021-10-21 PROCEDURE — C1889: CPT

## 2021-10-21 PROCEDURE — 36415 COLL VENOUS BLD VENIPUNCTURE: CPT

## 2021-10-21 PROCEDURE — 88307 TISSUE EXAM BY PATHOLOGIST: CPT

## 2021-10-21 PROCEDURE — 83735 ASSAY OF MAGNESIUM: CPT

## 2021-10-21 PROCEDURE — 85027 COMPLETE CBC AUTOMATED: CPT

## 2021-10-21 PROCEDURE — 86901 BLOOD TYPING SEROLOGIC RH(D): CPT

## 2021-10-21 PROCEDURE — 86900 BLOOD TYPING SEROLOGIC ABO: CPT

## 2021-10-21 PROCEDURE — 86769 SARS-COV-2 COVID-19 ANTIBODY: CPT

## 2021-10-31 NOTE — ASSESSMENT
[FreeTextEntry1] : She was instructed to get in at least 80 grams of protein in each day and to start a regular exercise regimen.

## 2021-10-31 NOTE — HISTORY OF PRESENT ILLNESS
[Home] : at home, [unfilled] , at the time of the visit. [Other Location: e.g. Home (Enter Location, City,State)___] : at [unfilled] [Verbal consent obtained from patient] : the patient, [unfilled] [de-identified] : Patient is doing well and has no complaints. 2 weeks s/p her SIPS. She is tolerating her protein shakes but falling short of her minimum required 80 grams per day. She is taking her vitamins and walking for exercise.

## 2021-11-17 ENCOUNTER — RESULT REVIEW (OUTPATIENT)
Age: 31
End: 2021-11-17

## 2021-11-22 ENCOUNTER — TRANSCRIPTION ENCOUNTER (OUTPATIENT)
Age: 31
End: 2021-11-22

## 2021-11-30 ENCOUNTER — APPOINTMENT (OUTPATIENT)
Dept: INTERNAL MEDICINE | Facility: CLINIC | Age: 31
End: 2021-11-30
Payer: COMMERCIAL

## 2021-11-30 ENCOUNTER — RESULT CHARGE (OUTPATIENT)
Age: 31
End: 2021-11-30

## 2021-11-30 ENCOUNTER — LABORATORY RESULT (OUTPATIENT)
Age: 31
End: 2021-11-30

## 2021-11-30 VITALS
HEIGHT: 63.19 IN | DIASTOLIC BLOOD PRESSURE: 83 MMHG | BODY MASS INDEX: 47.25 KG/M2 | WEIGHT: 270 LBS | HEART RATE: 77 BPM | SYSTOLIC BLOOD PRESSURE: 119 MMHG | RESPIRATION RATE: 14 BRPM | TEMPERATURE: 97.7 F

## 2021-11-30 DIAGNOSIS — R10.9 UNSPECIFIED ABDOMINAL PAIN: ICD-10-CM

## 2021-11-30 DIAGNOSIS — E66.01 MORBID (SEVERE) OBESITY DUE TO EXCESS CALORIES: ICD-10-CM

## 2021-11-30 LAB
CONTROL LINE: NORMAL
HCG UR QL: NEGATIVE
HIV 1+2 AB+HIV1P24 AG SERPLBLD IA.RAPID: NEGATIVE
HIV1 P24 AG SERPL IA-MCNC: NEGATIVE

## 2021-11-30 PROCEDURE — G0008: CPT

## 2021-11-30 PROCEDURE — 36415 COLL VENOUS BLD VENIPUNCTURE: CPT

## 2021-11-30 PROCEDURE — 81025 URINE PREGNANCY TEST: CPT

## 2021-11-30 PROCEDURE — 99214 OFFICE O/P EST MOD 30 MIN: CPT | Mod: 25,GC

## 2021-11-30 PROCEDURE — 90686 IIV4 VACC NO PRSV 0.5 ML IM: CPT

## 2021-12-01 ENCOUNTER — APPOINTMENT (OUTPATIENT)
Dept: BARIATRICS | Facility: CLINIC | Age: 31
End: 2021-12-01
Payer: COMMERCIAL

## 2021-12-01 VITALS
WEIGHT: 266 LBS | OXYGEN SATURATION: 99 % | DIASTOLIC BLOOD PRESSURE: 78 MMHG | SYSTOLIC BLOOD PRESSURE: 122 MMHG | TEMPERATURE: 97.6 F | BODY MASS INDEX: 52.22 KG/M2 | HEIGHT: 60 IN | HEART RATE: 69 BPM

## 2021-12-01 DIAGNOSIS — K91.2 POSTSURGICAL MALABSORPTION, NOT ELSEWHERE CLASSIFIED: ICD-10-CM

## 2021-12-01 PROCEDURE — 99024 POSTOP FOLLOW-UP VISIT: CPT

## 2021-12-03 LAB
ALBUMIN SERPL ELPH-MCNC: 4.2 G/DL
ALP BLD-CCNC: 108 U/L
ALT SERPL-CCNC: 31 U/L
ANION GAP SERPL CALC-SCNC: 15 MMOL/L
AST SERPL-CCNC: 24 U/L
BASOPHILS # BLD AUTO: 0.03 K/UL
BASOPHILS NFR BLD AUTO: 0.4 %
BILIRUB SERPL-MCNC: 0.3 MG/DL
BUN SERPL-MCNC: 11 MG/DL
C TRACH RRNA SPEC QL NAA+PROBE: NOT DETECTED
CALCIUM SERPL-MCNC: 9.6 MG/DL
CHLORIDE SERPL-SCNC: 104 MMOL/L
CO2 SERPL-SCNC: 22 MMOL/L
CREAT SERPL-MCNC: 0.75 MG/DL
EOSINOPHIL # BLD AUTO: 0.12 K/UL
EOSINOPHIL NFR BLD AUTO: 1.6 %
GLUCOSE SERPL-MCNC: 76 MG/DL
HCT VFR BLD CALC: 40.4 %
HGB BLD-MCNC: 12 G/DL
IMM GRANULOCYTES NFR BLD AUTO: 0.5 %
LYMPHOCYTES # BLD AUTO: 1.94 K/UL
LYMPHOCYTES NFR BLD AUTO: 26.4 %
MAN DIFF?: NORMAL
MCHC RBC-ENTMCNC: 25.9 PG
MCHC RBC-ENTMCNC: 29.7 GM/DL
MCV RBC AUTO: 87.3 FL
MONOCYTES # BLD AUTO: 0.41 K/UL
MONOCYTES NFR BLD AUTO: 5.6 %
N GONORRHOEA RRNA SPEC QL NAA+PROBE: NOT DETECTED
NEUTROPHILS # BLD AUTO: 4.82 K/UL
NEUTROPHILS NFR BLD AUTO: 65.5 %
PLATELET # BLD AUTO: 266 K/UL
POTASSIUM SERPL-SCNC: 3.7 MMOL/L
PROT SERPL-MCNC: 7.2 G/DL
RBC # BLD: 4.63 M/UL
RBC # FLD: 20.4 %
SODIUM SERPL-SCNC: 141 MMOL/L
SOURCE AMPLIFICATION: NORMAL
T PALLIDUM AB SER QL IA: NEGATIVE
WBC # FLD AUTO: 7.36 K/UL

## 2021-12-07 ENCOUNTER — TRANSCRIPTION ENCOUNTER (OUTPATIENT)
Age: 31
End: 2021-12-07

## 2021-12-07 ENCOUNTER — INPATIENT (INPATIENT)
Facility: HOSPITAL | Age: 31
LOS: 1 days | Discharge: HOME | DRG: 418 | End: 2021-12-09
Attending: SURGERY | Admitting: SURGERY
Payer: COMMERCIAL

## 2021-12-07 ENCOUNTER — APPOINTMENT (OUTPATIENT)
Dept: ULTRASOUND IMAGING | Facility: HOSPITAL | Age: 31
End: 2021-12-07

## 2021-12-07 VITALS
WEIGHT: 255.07 LBS | SYSTOLIC BLOOD PRESSURE: 125 MMHG | HEART RATE: 81 BPM | RESPIRATION RATE: 18 BRPM | TEMPERATURE: 98 F | DIASTOLIC BLOOD PRESSURE: 81 MMHG | OXYGEN SATURATION: 98 % | HEIGHT: 60 IN

## 2021-12-07 DIAGNOSIS — Z98.891 HISTORY OF UTERINE SCAR FROM PREVIOUS SURGERY: Chronic | ICD-10-CM

## 2021-12-07 DIAGNOSIS — Z98.84 BARIATRIC SURGERY STATUS: Chronic | ICD-10-CM

## 2021-12-07 LAB
ALBUMIN SERPL ELPH-MCNC: 4.2 G/DL — SIGNIFICANT CHANGE UP (ref 3.3–5)
ALP SERPL-CCNC: 113 U/L — SIGNIFICANT CHANGE UP (ref 40–120)
ALT FLD-CCNC: 29 U/L — SIGNIFICANT CHANGE UP (ref 10–45)
ANION GAP SERPL CALC-SCNC: 12 MMOL/L — SIGNIFICANT CHANGE UP (ref 5–17)
APPEARANCE UR: CLEAR — SIGNIFICANT CHANGE UP
AST SERPL-CCNC: 26 U/L — SIGNIFICANT CHANGE UP (ref 10–40)
BACTERIA # UR AUTO: PRESENT /HPF
BASOPHILS # BLD AUTO: 0.02 K/UL — SIGNIFICANT CHANGE UP (ref 0–0.2)
BASOPHILS NFR BLD AUTO: 0.2 % — SIGNIFICANT CHANGE UP (ref 0–2)
BILIRUB SERPL-MCNC: 0.3 MG/DL — SIGNIFICANT CHANGE UP (ref 0.2–1.2)
BILIRUB UR-MCNC: NEGATIVE — SIGNIFICANT CHANGE UP
BUN SERPL-MCNC: 9 MG/DL — SIGNIFICANT CHANGE UP (ref 7–23)
CALCIUM SERPL-MCNC: 9.6 MG/DL — SIGNIFICANT CHANGE UP (ref 8.4–10.5)
CHLORIDE SERPL-SCNC: 104 MMOL/L — SIGNIFICANT CHANGE UP (ref 96–108)
CO2 SERPL-SCNC: 24 MMOL/L — SIGNIFICANT CHANGE UP (ref 22–31)
COLOR SPEC: YELLOW — SIGNIFICANT CHANGE UP
CREAT SERPL-MCNC: 0.75 MG/DL — SIGNIFICANT CHANGE UP (ref 0.5–1.3)
DIFF PNL FLD: ABNORMAL
EOSINOPHIL # BLD AUTO: 0.1 K/UL — SIGNIFICANT CHANGE UP (ref 0–0.5)
EOSINOPHIL NFR BLD AUTO: 1.1 % — SIGNIFICANT CHANGE UP (ref 0–6)
EPI CELLS # UR: ABNORMAL /HPF (ref 0–5)
GLUCOSE SERPL-MCNC: 86 MG/DL — SIGNIFICANT CHANGE UP (ref 70–99)
GLUCOSE UR QL: NEGATIVE — SIGNIFICANT CHANGE UP
HCG SERPL-ACNC: <0 MIU/ML — SIGNIFICANT CHANGE UP
HCT VFR BLD CALC: 36.9 % — SIGNIFICANT CHANGE UP (ref 34.5–45)
HGB BLD-MCNC: 11.6 G/DL — SIGNIFICANT CHANGE UP (ref 11.5–15.5)
IMM GRANULOCYTES NFR BLD AUTO: 0.3 % — SIGNIFICANT CHANGE UP (ref 0–1.5)
KETONES UR-MCNC: 15 MG/DL
LEUKOCYTE ESTERASE UR-ACNC: NEGATIVE — SIGNIFICANT CHANGE UP
LIDOCAIN IGE QN: 9 U/L — SIGNIFICANT CHANGE UP (ref 7–60)
LYMPHOCYTES # BLD AUTO: 2.62 K/UL — SIGNIFICANT CHANGE UP (ref 1–3.3)
LYMPHOCYTES # BLD AUTO: 29.3 % — SIGNIFICANT CHANGE UP (ref 13–44)
MCHC RBC-ENTMCNC: 25.8 PG — LOW (ref 27–34)
MCHC RBC-ENTMCNC: 31.4 GM/DL — LOW (ref 32–36)
MCV RBC AUTO: 82.2 FL — SIGNIFICANT CHANGE UP (ref 80–100)
MONOCYTES # BLD AUTO: 0.43 K/UL — SIGNIFICANT CHANGE UP (ref 0–0.9)
MONOCYTES NFR BLD AUTO: 4.8 % — SIGNIFICANT CHANGE UP (ref 2–14)
NEUTROPHILS # BLD AUTO: 5.75 K/UL — SIGNIFICANT CHANGE UP (ref 1.8–7.4)
NEUTROPHILS NFR BLD AUTO: 64.3 % — SIGNIFICANT CHANGE UP (ref 43–77)
NITRITE UR-MCNC: NEGATIVE — SIGNIFICANT CHANGE UP
NRBC # BLD: 0 /100 WBCS — SIGNIFICANT CHANGE UP (ref 0–0)
PH UR: 6.5 — SIGNIFICANT CHANGE UP (ref 5–8)
PLATELET # BLD AUTO: 313 K/UL — SIGNIFICANT CHANGE UP (ref 150–400)
POTASSIUM SERPL-MCNC: 3.5 MMOL/L — SIGNIFICANT CHANGE UP (ref 3.5–5.3)
POTASSIUM SERPL-SCNC: 3.5 MMOL/L — SIGNIFICANT CHANGE UP (ref 3.5–5.3)
PROT SERPL-MCNC: 8.1 G/DL — SIGNIFICANT CHANGE UP (ref 6–8.3)
PROT UR-MCNC: ABNORMAL MG/DL
RBC # BLD: 4.49 M/UL — SIGNIFICANT CHANGE UP (ref 3.8–5.2)
RBC # FLD: 18.6 % — HIGH (ref 10.3–14.5)
RBC CASTS # UR COMP ASSIST: < 5 /HPF — SIGNIFICANT CHANGE UP
SARS-COV-2 RNA SPEC QL NAA+PROBE: NEGATIVE — SIGNIFICANT CHANGE UP
SODIUM SERPL-SCNC: 140 MMOL/L — SIGNIFICANT CHANGE UP (ref 135–145)
SP GR SPEC: 1.02 — SIGNIFICANT CHANGE UP (ref 1–1.03)
UROBILINOGEN FLD QL: 0.2 E.U./DL — SIGNIFICANT CHANGE UP
WBC # BLD: 8.95 K/UL — SIGNIFICANT CHANGE UP (ref 3.8–10.5)
WBC # FLD AUTO: 8.95 K/UL — SIGNIFICANT CHANGE UP (ref 3.8–10.5)
WBC UR QL: < 5 /HPF — SIGNIFICANT CHANGE UP

## 2021-12-07 PROCEDURE — 74177 CT ABD & PELVIS W/CONTRAST: CPT | Mod: 26,MA

## 2021-12-07 PROCEDURE — 99222 1ST HOSP IP/OBS MODERATE 55: CPT

## 2021-12-07 PROCEDURE — 47562 LAPAROSCOPIC CHOLECYSTECTOMY: CPT | Mod: GC

## 2021-12-07 PROCEDURE — 76705 ECHO EXAM OF ABDOMEN: CPT | Mod: 26

## 2021-12-07 PROCEDURE — 99285 EMERGENCY DEPT VISIT HI MDM: CPT

## 2021-12-07 RX ORDER — ONDANSETRON 8 MG/1
4 TABLET, FILM COATED ORAL ONCE
Refills: 0 | Status: COMPLETED | OUTPATIENT
Start: 2021-12-07 | End: 2021-12-07

## 2021-12-07 RX ORDER — ACETAMINOPHEN 500 MG
650 TABLET ORAL EVERY 6 HOURS
Refills: 0 | Status: DISCONTINUED | OUTPATIENT
Start: 2021-12-07 | End: 2021-12-07

## 2021-12-07 RX ORDER — CEFTRIAXONE 500 MG/1
1000 INJECTION, POWDER, FOR SOLUTION INTRAMUSCULAR; INTRAVENOUS EVERY 24 HOURS
Refills: 0 | Status: DISCONTINUED | OUTPATIENT
Start: 2021-12-08 | End: 2021-12-09

## 2021-12-07 RX ORDER — IOHEXOL 300 MG/ML
30 INJECTION, SOLUTION INTRAVENOUS ONCE
Refills: 0 | Status: COMPLETED | OUTPATIENT
Start: 2021-12-07 | End: 2021-12-07

## 2021-12-07 RX ORDER — ACETAMINOPHEN 500 MG
650 TABLET ORAL EVERY 6 HOURS
Refills: 0 | Status: DISCONTINUED | OUTPATIENT
Start: 2021-12-07 | End: 2021-12-09

## 2021-12-07 RX ORDER — HYDROMORPHONE HYDROCHLORIDE 2 MG/ML
0.5 INJECTION INTRAMUSCULAR; INTRAVENOUS; SUBCUTANEOUS ONCE
Refills: 0 | Status: DISCONTINUED | OUTPATIENT
Start: 2021-12-07 | End: 2021-12-08

## 2021-12-07 RX ORDER — KETOROLAC TROMETHAMINE 30 MG/ML
15 SYRINGE (ML) INJECTION EVERY 6 HOURS
Refills: 0 | Status: DISCONTINUED | OUTPATIENT
Start: 2021-12-07 | End: 2021-12-09

## 2021-12-07 RX ORDER — SODIUM CHLORIDE 9 MG/ML
1000 INJECTION, SOLUTION INTRAVENOUS
Refills: 0 | Status: DISCONTINUED | OUTPATIENT
Start: 2021-12-07 | End: 2021-12-09

## 2021-12-07 RX ORDER — METRONIDAZOLE 500 MG
500 TABLET ORAL ONCE
Refills: 0 | Status: COMPLETED | OUTPATIENT
Start: 2021-12-07 | End: 2021-12-07

## 2021-12-07 RX ORDER — HEPARIN SODIUM 5000 [USP'U]/ML
7500 INJECTION INTRAVENOUS; SUBCUTANEOUS EVERY 8 HOURS
Refills: 0 | Status: DISCONTINUED | OUTPATIENT
Start: 2021-12-07 | End: 2021-12-09

## 2021-12-07 RX ORDER — CEFTRIAXONE 500 MG/1
1000 INJECTION, POWDER, FOR SOLUTION INTRAMUSCULAR; INTRAVENOUS ONCE
Refills: 0 | Status: COMPLETED | OUTPATIENT
Start: 2021-12-07 | End: 2021-12-07

## 2021-12-07 RX ORDER — MORPHINE SULFATE 50 MG/1
4 CAPSULE, EXTENDED RELEASE ORAL ONCE
Refills: 0 | Status: DISCONTINUED | OUTPATIENT
Start: 2021-12-07 | End: 2021-12-07

## 2021-12-07 RX ORDER — METRONIDAZOLE 500 MG
500 TABLET ORAL EVERY 8 HOURS
Refills: 0 | Status: DISCONTINUED | OUTPATIENT
Start: 2021-12-08 | End: 2021-12-09

## 2021-12-07 RX ORDER — ONDANSETRON 8 MG/1
4 TABLET, FILM COATED ORAL EVERY 6 HOURS
Refills: 0 | Status: DISCONTINUED | OUTPATIENT
Start: 2021-12-07 | End: 2021-12-09

## 2021-12-07 RX ADMIN — IOHEXOL 30 MILLILITER(S): 300 INJECTION, SOLUTION INTRAVENOUS at 14:30

## 2021-12-07 RX ADMIN — ONDANSETRON 4 MILLIGRAM(S): 8 TABLET, FILM COATED ORAL at 22:04

## 2021-12-07 RX ADMIN — CEFTRIAXONE 100 MILLIGRAM(S): 500 INJECTION, POWDER, FOR SOLUTION INTRAMUSCULAR; INTRAVENOUS at 19:09

## 2021-12-07 RX ADMIN — SODIUM CHLORIDE 150 MILLILITER(S): 9 INJECTION, SOLUTION INTRAVENOUS at 20:28

## 2021-12-07 RX ADMIN — Medication 15 MILLIGRAM(S): at 22:02

## 2021-12-07 RX ADMIN — Medication 100 MILLIGRAM(S): at 20:20

## 2021-12-07 RX ADMIN — MORPHINE SULFATE 4 MILLIGRAM(S): 50 CAPSULE, EXTENDED RELEASE ORAL at 15:11

## 2021-12-07 RX ADMIN — ONDANSETRON 4 MILLIGRAM(S): 8 TABLET, FILM COATED ORAL at 14:41

## 2021-12-07 RX ADMIN — MORPHINE SULFATE 4 MILLIGRAM(S): 50 CAPSULE, EXTENDED RELEASE ORAL at 14:41

## 2021-12-07 NOTE — ED PROVIDER NOTE - OBJECTIVE STATEMENT
31 year old female with history of gastric bypass with Dr. Avendaño (Sept 2021) presents to ED with concern for right sided abdominal pain over the past 1-2 weeks.  Patient states pain is getting progressively worse and is more localized to right mid abdomen.  She notes associated nausea without emesis.  Patient denies associated fever, chills, headache, visual changes, chest pain, shortness of breath, urinary symptoms, changes to bowel movements, peripheral edema, calf pain/tenderness, recent travel, known sick contacts or any additional acute complaints or concerns at this time.

## 2021-12-07 NOTE — H&P ADULT - NSHPPHYSICALEXAM_GEN_ALL_CORE
GENERAL: NAD  HEENT: NCAT, MMM, Normal conjunctiva, PERRL  RESP: Nonlabored breathing, No respiratory distress  CARD: Normal rate, Normal peripheral perfusion  GI: Soft, RUQ tenderness with + Lanier's sign, mild epigastric tenderness, No guarding, No rebound tenderness, prior laparoscopic scars and csection well healed  EXTREM: WWP  NEURO: AAOx3, No focal motor or sensory deficits  PSYCH: Affect and characteristics of appearance, verbalizations, and behaviors are appropriate

## 2021-12-07 NOTE — CONSULT NOTE ADULT - ASSESSMENT
31 year old female with history of gastric bypass with Dr. Avendaño (Sept 2021), Hx of HTN, pre-DM presents to ED with concern for right sided abdominal pain over the past 1-2 weeks. GI consulted for abdominal pain.    #Abdominal Pain  Patient presenting to ED after reporting 2 week history of abdominal pain, localized to RUQ. Notes initially intermittent in nature, occurring every 1-2 days initially, now progressing to constant pain. Notes a syncopal episode 2 days prior. Pain localized to RUQ, also with positive Lanier sign on exam. Labs unremarkable, awaiting imaging. Cholelithiasis, including cholecystitis high on ddx.   -f/u Abdominal US  -f/u CT A/P   -Pain control as per primary team, would avoid NSAIDs  -Remainder of care as per primary team    Case discussed with c attending and primary team.     Ifeoma Rojas DO  Gastroenterology Fellow  Pager: [phone]

## 2021-12-07 NOTE — H&P ADULT - HISTORY OF PRESENT ILLNESS
30 y/o F w/ PMH HTN, prediabetes and  morbid obesity s/p robotic assisted duodenal switch on 9/7 with Dr. Avendaño. Patient now presents with progressively worsening RUQ abdominal pain. Patient reports she has had intermittent sharp RUQ abdominal pain that occurs following meals last 2 weeks; Pain normally resolves after  20-30 minutes but today pain is constant; it does not radiate. She has not tried anything for pain and reports nausea without vomiting no abdominal bloating, diarrhea or constipation; no fevers chills, SOB or chest pain. Patient did report one episode of syncope while shopping at [employer]; she was having RUQ pain at that time; it resolved within minutes. She saw Her PCP last week who ordered RUQ ultrasound but has not had it. She has lost 83 pounds since her surgery. No other concerns or complaints.     In the ED, patient afebrile with normal VS. Labs with normal WBC of 8.95, Hg of 11.6, TB 0.3, and normal LFT's. RUQ pending, CT with  normal gallbladder and Hepatomegaly and steatosis with subcentimeter hypodensity in the right lobe too small to characterize.     PMH: HTN, prediabetes, MO   PSH: robotic assisted duodenal switch, C section   allergies: none  Meds: Multivitamins, Iron supplements   SH: no tobacco, alcohol or recreational drug use

## 2021-12-07 NOTE — CONSULT NOTE ADULT - SUBJECTIVE AND OBJECTIVE BOX
HPI: 31 year old female with history of gastric bypass with Dr. Avendaño (2021), Hx of HTN, pre-DM presents to ED with concern for right sided abdominal pain over the past 1-2 weeks.  Patient states pain is getting progressively worse and is more localized to right mid abdomen.  She notes associated nausea without emesis.  Patient denies associated fever, chills, headache, visual changes, chest pain, shortness of breath, urinary symptoms, changes to bowel movements, peripheral edema, calf pain/tenderness, recent travel, known sick contacts or any additional acute complaints or concerns at this time.    PMHx: Obesity s/p robot assisted duodenal switch (2021)  PSHx: robot assisted duodenal switch (2021), C/S  Allergies: NKDA  Meds: Fe supplementation, MVI  SH: no EtOH, never smoker, no illicit drug use  FH: No familial history of CRC, gastric, pancreatic cancer    Allergies    No Known Allergies    Intolerances      MEDICATIONS:  MEDICATIONS  (STANDING):    MEDICATIONS  (PRN):    PAST MEDICAL & SURGICAL HISTORY:  Morbid obesity    Prediabetes    H/O:         FAMILY HISTORY:    SOCIAL HISTORY:  [place]: [ ] Current, [ ] Former, [ ] Never; Pack Years:  Alcohol:  Illicit Drugs:    REVIEW OF SYSTEMS:  Constitutional: No fever, chills, weight loss, or fatigue  ENMT:  No visual changes; No difficulty hearing, tinnitus, vertigo; No sinus or throat pain  Neck: No pain or stiffness  Respiratory: No cough, wheezing, or hemoptysis; No shortness of breath  Cardiovascular: No chest pain, palpitations, dizziness, orthopnea, PND, or leg swelling  Gastrointestinal: No abdominal or epigastric pain. No  nausea, vomiting, or hematemesis. No diarrhea, constipation, or steatorrhea. No melena or hematochezia  Genitourinary: No dysuria, urinary frequency/hesitancy, or hematuria  Skin: No itching, burning, rashes or lesions   Musculoskeletal: No joint pain or swelling; No muscle, back or extremity pain    Vital Signs Last 24 Hrs  T(C): 36.9 (07 Dec 2021 13:47), Max: 36.9 (07 Dec 2021 13:47)  T(F): 98.5 (07 Dec 2021 13:47), Max: 98.5 (07 Dec 2021 13:47)  HR: 81 (07 Dec 2021 13:47) (81 - 81)  BP: 125/81 (07 Dec 2021 13:47) (125/81 - 125/81)  BP(mean): --  RR: 18 (07 Dec 2021 13:47) (18 - 18)  SpO2: 98% (07 Dec 2021 13:47) (98% - 98%)      PHYSICAL EXAM:    General:  female; sitting in a chair; in no acute distress  HEENT: MMM, conjunctiva and sclera clear  Gastrointestinal: Soft, ; tenderness to palpation in RUQ, +Lanier sign; non-distended; Normal bowel sounds; No rebound or guarding  Extremities: Normal range of motion, No clubbing, cyanosis or edema  Neurological: Alert and oriented x3  Skin: Warm and dry. No obvious rash    LABS:                        11.6   8.95  )-----------( 313      ( 07 Dec 2021 14:33 )             36.9     12-    140  |  104  |  9   ----------------------------<  86  3.5   |  24  |  0.75    Ca    9.6      07 Dec 2021 14:33    TPro  8.1  /  Alb  4.2  /  TBili  0.3  /  DBili  x   /  AST  26  /  ALT  29  /  AlkPhos  113  12-07        RADIOLOGY & ADDITIONAL STUDIES:

## 2021-12-07 NOTE — ED ADULT NURSE NOTE - OBJECTIVE STATEMENT
Patient presents to ED c/o 7/10 RUQ pain for 2 weeks worsening on eating. Patient has pain on palpation of RUQ and RLQ. Patient denies nausea, vomiting, diarrhea, fever, chest pain, SOB, or urinary sx. PSHx Gastric Bypass. NKA status confirmed. Patient is fully vaccinated against COVID.

## 2021-12-07 NOTE — H&P ADULT - ASSESSMENT
32 y/o F w/ PMH HTN, prediabetes and morbid obesity s/p robotic assisted duodenal switch on 9/7 with Dr. Avendaño, now presents with intermittent sharp RUQ abdominal pain following meals last 2 weeks without fevers, chills, N/V, abd bloating; currently afebrile with normal VS. Labs with normal WBC of 8.95, Hg of 11.6, TB 0.3, and normal LFT's and RUQ with __; Concerning for Biliary colic vs acute cholecystitis.     plan:   admit to regional   NPO/IVF/OOB/IS  pain/nausea control  continue Ceftriaxone and flagyl  am labs  ppx scd, heparin   team 2 surgery will continue to follow  plan discussed with chief and attending on call

## 2021-12-07 NOTE — ED ADULT TRIAGE NOTE - CHIEF COMPLAINT QUOTE
Pt presents to ED c/o RUQ pain x 2 weeks. Hx of gastric bypass 9/7/21. Reports nausea and pain worse after eating. Denies vomiting, diarrhea, fevers, cp, sob.

## 2021-12-07 NOTE — H&P ADULT - NSHPLABSRESULTS_GEN_ALL_CORE
LABS:                        11.6   8.95  )-----------( 313      ( 07 Dec 2021 14:33 )             36.9     12-    140  |  104  |  9   ----------------------------<  86  3.5   |  24  |  0.75    Ca    9.6      07 Dec 2021 14:33    TPro  8.1  /  Alb  4.2  /  TBili  0.3  /  DBili  x   /  AST  26  /  ALT  29  /  AlkPhos  113  12-      Urinalysis Basic - ( 07 Dec 2021 14:33 )    Color: Yellow / Appearance: Clear / S.020 / pH: x  Gluc: x / Ketone: 15 mg/dL  / Bili: Negative / Urobili: 0.2 E.U./dL   Blood: x / Protein: Trace mg/dL / Nitrite: NEGATIVE   Leuk Esterase: NEGATIVE / RBC: < 5 /HPF / WBC < 5 /HPF   Sq Epi: x / Non Sq Epi: Moderate /HPF / Bacteria: Present /HPF        RADIOLOGY & ADDITIONAL STUDIES:  CT Abdomen and Pelvis w/ Oral Cont and w/ IV Cont:   EXAM:  CT ABDOMEN AND PELVIS OC IC                          PROCEDURE DATE:  2021          INTERPRETATION:  CLINICAL INFORMATION: Abdominal pain, postop    COMPARISON: None.    PROCEDURE:  CT of the Abdomen and Pelvis was performed with intravenous contrast.  Intravenous contrast: 90 ml Omnipaque 350. 10 ml discarded.  Oral contrast: positive contrast was administered.  Sagittal and coronal reformats were performed.    FINDINGS:  LOWER CHEST: Within normal limits.    LIVER: Hepatomegaly and steatosis. Subcentimeter hypodensity in the right lobe too small to characterize, if clinically warranted may be further evaluated with MRI.  BILE DUCTS: Normal caliber.  GALLBLADDER: Within normal limits.  SPLEEN: Within normal limits.  PANCREAS: Within normal limits.  ADRENALS: Within normal limits.  KIDNEYS/URETERS: Within normal limits.    BLADDER: Within normal limits.  REPRODUCTIVE ORGANS: Uterus and adnexa within normal limits.    BOWEL: No bowel obstruction. Appendix is normal. Sleeve gastrectomy.  PERITONEUM: No ascites.  VESSELS: Within normal limits.  RETROPERITONEUM/LYMPH NODES: No lymphadenopathy.  ABDOMINAL WALL: Within normal limits.  BONES: Within normal limits.    IMPRESSION:  No acute findings. Incidental comments as above.        --- End of Report ---          Thank you for the opportunity to participate in the care of this patient.      SADE CAMPA MD; Attending Radiologist  This document has been electronically signed. Dec  7 2021  4:12PM (21 @ 16:01)

## 2021-12-07 NOTE — ED ADULT TRIAGE NOTE - HEIGHT IN CM
Patient stated name &     Chief Complaint   Patient presents with    Hand Pain     Right Hand injury and pain      Was carrying her laundry basket and [facility]        Health Maintenance Due   Topic    Pneumococcal 0-64 years (1 of 1 - PPSV23)    Foot Exam Q1     Eye Exam Retinal or Dilated     Shingrix Vaccine Age 50> (1 of 2)       Wt Readings from Last 3 Encounters:   12/15/20 232 lb 3.2 oz (105.3 kg)   10/06/20 223 lb (101.2 kg)   08/10/20 241 lb (109.3 kg)     Temp Readings from Last 3 Encounters:   12/15/20 98 °F (36.7 °C) (Oral)   10/06/20 98.1 °F (36.7 °C) (Oral)   08/10/20 97.2 °F (36.2 °C)     BP Readings from Last 3 Encounters:   12/15/20 135/80   10/06/20 120/81   08/10/20 122/75     Pulse Readings from Last 3 Encounters:   12/15/20 63   10/06/20 86   08/10/20 83         Learning Assessment:  :     Learning Assessment 2020 3/31/2020   PRIMARY LEARNER Patient Patient   CO-LEARNER CAREGIVER No No   PRIMARY LANGUAGE ENGLISH ENGLISH   LEARNER PREFERENCE PRIMARY DEMONSTRATION DEMONSTRATION   ANSWERED BY patient  patient    RELATIONSHIP SELF SELF       Depression Screening:  :     3 most recent PHQ Screens 12/15/2020   Little interest or pleasure in doing things Not at all   Feeling down, depressed, irritable, or hopeless Not at all   Total Score PHQ 2 0       Fall Risk Assessment:  :     No flowsheet data found. Abuse Screening:  :     No flowsheet data found. Coordination of Care Questionnaire:  :     1) Have you been to an emergency room, urgent care clinic since your last visit? No    Hospitalized since your last visit? No             2) Have you seen or consulted any other health care providers outside of [address] [place] [address] since your last visit?  No  (Include any pap smears or colon screenings in this section.)    Patient is accompanied by self I have received verbal consent from Rebekah Jimenez to discuss any/all medical information while they are present in the room. 152.4

## 2021-12-07 NOTE — ED PROVIDER NOTE - CLINICAL SUMMARY MEDICAL DECISION MAKING FREE TEXT BOX
Patient in ED w concern for abd pain x 1 week.  Pain reproducible to right abdomen.  Patient non toxic, in NAD.  Labs, imaging ordered.  Patient given medication for nausea, pain with improvement in symptoms. Patient in ED w concern for abd pain x 1 week.  Pain reproducible to right abdomen.  Patient non toxic, in NAD.  Labs, imaging ordered.  Patient given medication for nausea, pain with improvement in symptoms.  Surgery contacted given post op Dr. Avendaño and in ED to evaluate.  Surgery reviews all imaging and is requesting admission to regional bed, service of Dr. Avendaño and an initial dose of flagyl, rocephin which is ordered in ED.  Patient is aware of plan and in agreement.  Will admit at this time.

## 2021-12-07 NOTE — ED PROVIDER NOTE - NS ED ROS FT
Constitutional: No fever or chills.   Eyes: No pain, blurry vision, or discharge.  ENMT: No hearing changes, pain, discharge or infections. No neck pain or stiffness.  Cardiac: No chest pain, SOB or edema. No chest pain with exertion.  Respiratory: No cough or respiratory distress. No hemoptysis. No history of asthma or RAD.  GI: + nausea, no vomiting, no diarrhea, + right sided abdominal pain.  : No dysuria, frequency or burning.  MS: No myalgia, muscle weakness, joint pain or back pain.  Neuro: No headache or weakness. No LOC.  Skin: No skin rash.   Endocrine: No history of thyroid disease or diabetes.  Except as documented in the HPI, all other systems are negative.

## 2021-12-07 NOTE — ED PROVIDER NOTE - PHYSICAL EXAMINATION
VITAL SIGNS: I have reviewed nursing notes and confirm.  CONSTITUTIONAL: Non toxic; in no acute distress.   SKIN:  Warm and dry, no acute rash.   HEAD:  Normocephalic, atraumatic.  EYES: PERRL.  EOM intact; conjunctiva and sclera clear.  ENT: No nasal discharge; airway clear.   NECK: Supple; non tender.  CARD: S1, S2 normal; no murmurs, gallops, or rubs. Regular rate and rhythm.   RESP:  Clear to auscultation b/l, no wheezes, rales or rhonchi.  ABD: Normal bowel sounds; soft; non-distended; + TTP over right mid abdomen; no guarding/rebound.  EXT: Normal ROM. No clubbing, cyanosis or edema. 2+ pulses to b/l ue/le.  NEURO: Alert, oriented, grossly unremarkable.  5/5 strength x 4 extremities against gravity and external force.  No drift x 4 extremities.  Sensation intact and symmetric x 4 extremities.  No facial asymmetry.    PSYCH: Cooperative, mood and affect appropriate.

## 2021-12-08 ENCOUNTER — RESULT REVIEW (OUTPATIENT)
Age: 31
End: 2021-12-08

## 2021-12-08 ENCOUNTER — TRANSCRIPTION ENCOUNTER (OUTPATIENT)
Age: 31
End: 2021-12-08

## 2021-12-08 LAB
ALBUMIN SERPL ELPH-MCNC: 3.8 G/DL — SIGNIFICANT CHANGE UP (ref 3.3–5)
ALP SERPL-CCNC: 94 U/L — SIGNIFICANT CHANGE UP (ref 40–120)
ALT FLD-CCNC: 27 U/L — SIGNIFICANT CHANGE UP (ref 10–45)
ANION GAP SERPL CALC-SCNC: 9 MMOL/L — SIGNIFICANT CHANGE UP (ref 5–17)
AST SERPL-CCNC: 25 U/L — SIGNIFICANT CHANGE UP (ref 10–40)
BILIRUB SERPL-MCNC: 0.3 MG/DL — SIGNIFICANT CHANGE UP (ref 0.2–1.2)
BLD GP AB SCN SERPL QL: NEGATIVE — SIGNIFICANT CHANGE UP
BLD GP AB SCN SERPL QL: NEGATIVE — SIGNIFICANT CHANGE UP
BUN SERPL-MCNC: 8 MG/DL — SIGNIFICANT CHANGE UP (ref 7–23)
CALCIUM SERPL-MCNC: 9.2 MG/DL — SIGNIFICANT CHANGE UP (ref 8.4–10.5)
CHLORIDE SERPL-SCNC: 103 MMOL/L — SIGNIFICANT CHANGE UP (ref 96–108)
CO2 SERPL-SCNC: 26 MMOL/L — SIGNIFICANT CHANGE UP (ref 22–31)
CREAT SERPL-MCNC: 0.79 MG/DL — SIGNIFICANT CHANGE UP (ref 0.5–1.3)
CULTURE RESULTS: SIGNIFICANT CHANGE UP
GLUCOSE SERPL-MCNC: 82 MG/DL — SIGNIFICANT CHANGE UP (ref 70–99)
HCT VFR BLD CALC: 32 % — LOW (ref 34.5–45)
HGB BLD-MCNC: 10.1 G/DL — LOW (ref 11.5–15.5)
MAGNESIUM SERPL-MCNC: 1.9 MG/DL — SIGNIFICANT CHANGE UP (ref 1.6–2.6)
MCHC RBC-ENTMCNC: 26 PG — LOW (ref 27–34)
MCHC RBC-ENTMCNC: 31.6 GM/DL — LOW (ref 32–36)
MCV RBC AUTO: 82.5 FL — SIGNIFICANT CHANGE UP (ref 80–100)
NRBC # BLD: 0 /100 WBCS — SIGNIFICANT CHANGE UP (ref 0–0)
PHOSPHATE SERPL-MCNC: 4.3 MG/DL — SIGNIFICANT CHANGE UP (ref 2.5–4.5)
PLATELET # BLD AUTO: 239 K/UL — SIGNIFICANT CHANGE UP (ref 150–400)
POTASSIUM SERPL-MCNC: 3.3 MMOL/L — LOW (ref 3.5–5.3)
POTASSIUM SERPL-SCNC: 3.3 MMOL/L — LOW (ref 3.5–5.3)
PROT SERPL-MCNC: 6.7 G/DL — SIGNIFICANT CHANGE UP (ref 6–8.3)
RBC # BLD: 3.88 M/UL — SIGNIFICANT CHANGE UP (ref 3.8–5.2)
RBC # FLD: 18.5 % — HIGH (ref 10.3–14.5)
RH IG SCN BLD-IMP: POSITIVE — SIGNIFICANT CHANGE UP
RH IG SCN BLD-IMP: POSITIVE — SIGNIFICANT CHANGE UP
SODIUM SERPL-SCNC: 138 MMOL/L — SIGNIFICANT CHANGE UP (ref 135–145)
SPECIMEN SOURCE: SIGNIFICANT CHANGE UP
WBC # BLD: 6.62 K/UL — SIGNIFICANT CHANGE UP (ref 3.8–10.5)
WBC # FLD AUTO: 6.62 K/UL — SIGNIFICANT CHANGE UP (ref 3.8–10.5)

## 2021-12-08 PROCEDURE — 99232 SBSQ HOSP IP/OBS MODERATE 35: CPT

## 2021-12-08 PROCEDURE — 88304 TISSUE EXAM BY PATHOLOGIST: CPT | Mod: 26

## 2021-12-08 RX ORDER — POTASSIUM CHLORIDE 20 MEQ
10 PACKET (EA) ORAL
Refills: 0 | Status: COMPLETED | OUTPATIENT
Start: 2021-12-08 | End: 2021-12-08

## 2021-12-08 RX ORDER — HYDROMORPHONE HYDROCHLORIDE 2 MG/ML
0.5 INJECTION INTRAMUSCULAR; INTRAVENOUS; SUBCUTANEOUS ONCE
Refills: 0 | Status: DISCONTINUED | OUTPATIENT
Start: 2021-12-08 | End: 2021-12-08

## 2021-12-08 RX ORDER — BUPIVACAINE 13.3 MG/ML
20 INJECTION, SUSPENSION, LIPOSOMAL INFILTRATION ONCE
Refills: 0 | Status: DISCONTINUED | OUTPATIENT
Start: 2021-12-08 | End: 2021-12-09

## 2021-12-08 RX ORDER — PANTOPRAZOLE SODIUM 20 MG/1
40 TABLET, DELAYED RELEASE ORAL EVERY 24 HOURS
Refills: 0 | Status: DISCONTINUED | OUTPATIENT
Start: 2021-12-08 | End: 2021-12-09

## 2021-12-08 RX ORDER — ACETAMINOPHEN 500 MG
1000 TABLET ORAL ONCE
Refills: 0 | Status: COMPLETED | OUTPATIENT
Start: 2021-12-08 | End: 2021-12-08

## 2021-12-08 RX ORDER — SODIUM CHLORIDE 9 MG/ML
500 INJECTION, SOLUTION INTRAVENOUS ONCE
Refills: 0 | Status: COMPLETED | OUTPATIENT
Start: 2021-12-08 | End: 2021-12-08

## 2021-12-08 RX ADMIN — Medication 15 MILLIGRAM(S): at 13:00

## 2021-12-08 RX ADMIN — Medication 500 MILLIGRAM(S): at 04:59

## 2021-12-08 RX ADMIN — CEFTRIAXONE 100 MILLIGRAM(S): 500 INJECTION, POWDER, FOR SOLUTION INTRAMUSCULAR; INTRAVENOUS at 19:52

## 2021-12-08 RX ADMIN — Medication 400 MILLIGRAM(S): at 18:26

## 2021-12-08 RX ADMIN — Medication 15 MILLIGRAM(S): at 05:20

## 2021-12-08 RX ADMIN — HYDROMORPHONE HYDROCHLORIDE 0.5 MILLIGRAM(S): 2 INJECTION INTRAMUSCULAR; INTRAVENOUS; SUBCUTANEOUS at 17:32

## 2021-12-08 RX ADMIN — PANTOPRAZOLE SODIUM 40 MILLIGRAM(S): 20 TABLET, DELAYED RELEASE ORAL at 06:05

## 2021-12-08 RX ADMIN — Medication 500 MILLIGRAM(S): at 21:45

## 2021-12-08 RX ADMIN — Medication 100 MILLIEQUIVALENT(S): at 11:27

## 2021-12-08 RX ADMIN — Medication 15 MILLIGRAM(S): at 19:29

## 2021-12-08 RX ADMIN — Medication 1000 MILLIGRAM(S): at 19:23

## 2021-12-08 RX ADMIN — HYDROMORPHONE HYDROCHLORIDE 0.5 MILLIGRAM(S): 2 INJECTION INTRAMUSCULAR; INTRAVENOUS; SUBCUTANEOUS at 11:10

## 2021-12-08 RX ADMIN — Medication 100 MILLIEQUIVALENT(S): at 10:18

## 2021-12-08 RX ADMIN — SODIUM CHLORIDE 1000 MILLILITER(S): 9 INJECTION, SOLUTION INTRAVENOUS at 05:32

## 2021-12-08 RX ADMIN — HYDROMORPHONE HYDROCHLORIDE 0.5 MILLIGRAM(S): 2 INJECTION INTRAMUSCULAR; INTRAVENOUS; SUBCUTANEOUS at 11:25

## 2021-12-08 RX ADMIN — Medication 100 MILLIEQUIVALENT(S): at 12:30

## 2021-12-08 RX ADMIN — HYDROMORPHONE HYDROCHLORIDE 0.5 MILLIGRAM(S): 2 INJECTION INTRAMUSCULAR; INTRAVENOUS; SUBCUTANEOUS at 02:50

## 2021-12-08 RX ADMIN — HEPARIN SODIUM 7500 UNIT(S): 5000 INJECTION INTRAVENOUS; SUBCUTANEOUS at 05:13

## 2021-12-08 RX ADMIN — SODIUM CHLORIDE 150 MILLILITER(S): 9 INJECTION, SOLUTION INTRAVENOUS at 19:29

## 2021-12-08 RX ADMIN — HEPARIN SODIUM 7500 UNIT(S): 5000 INJECTION INTRAVENOUS; SUBCUTANEOUS at 21:45

## 2021-12-08 RX ADMIN — Medication 15 MILLIGRAM(S): at 12:30

## 2021-12-08 RX ADMIN — SODIUM CHLORIDE 150 MILLILITER(S): 9 INJECTION, SOLUTION INTRAVENOUS at 06:05

## 2021-12-08 RX ADMIN — HYDROMORPHONE HYDROCHLORIDE 0.5 MILLIGRAM(S): 2 INJECTION INTRAMUSCULAR; INTRAVENOUS; SUBCUTANEOUS at 18:04

## 2021-12-08 RX ADMIN — HYDROMORPHONE HYDROCHLORIDE 0.5 MILLIGRAM(S): 2 INJECTION INTRAMUSCULAR; INTRAVENOUS; SUBCUTANEOUS at 19:23

## 2021-12-08 RX ADMIN — HYDROMORPHONE HYDROCHLORIDE 0.5 MILLIGRAM(S): 2 INJECTION INTRAMUSCULAR; INTRAVENOUS; SUBCUTANEOUS at 02:24

## 2021-12-08 RX ADMIN — Medication 15 MILLIGRAM(S): at 04:59

## 2021-12-08 RX ADMIN — SODIUM CHLORIDE 150 MILLILITER(S): 9 INJECTION, SOLUTION INTRAVENOUS at 00:44

## 2021-12-08 NOTE — BRIEF OPERATIVE NOTE - OPERATION/FINDINGS
Pt placed in reverse Trendelenburg w/ right side up. Omental attachments to GB were gently swept away. GB fundus retracted superiorly over dome of liver. Filmy adhesions between the GB & omentum/duo cauterized. GB infundibulum retracted laterally towards RLQ exposing Calot’s triangle. Critical view of safety obtained. Cystic duct clipped and divided, artery taken with electrocautery.. Peritoneal attachments btw GB & liver bed  w/ electrocautery. Hemostasis achieved. No leakage of bile from cystic duct stump. Abdomen irrigated with warm saline 2/2 bile spillage from specimen. Fascia closed with vicryl.

## 2021-12-08 NOTE — PACU DISCHARGE NOTE - COMMENTS
Pt met criteria for discharge- s.p lap cholecystectomy with 4 abdominal Dermabond lap sites- pr expresses Pain relief after IV Dilaudid/IV Tylenol/ IV Toradol- pt able to ambulate with one person assist and voided - endorsed to RN Frances- pt left unit via bed back to her room in 4309.1

## 2021-12-08 NOTE — DISCHARGE NOTE PROVIDER - CARE PROVIDER_API CALL
Zafar Avendaño (MD)  Surgery  [address]  [place], [place] [address]  Phone: [phone]  Fax: [phone]  Follow Up Time:

## 2021-12-08 NOTE — DISCHARGE NOTE PROVIDER - NSDCACTIVITY_GEN_ALL_CORE
Return to Work/School allowed/Sex allowed/Showering allowed/Stairs allowed/Walking - Indoors allowed/Walking - Outdoors allowed/Follow Instructions Provided by your Surgical Team Showering allowed/Stairs allowed/Walking - Indoors allowed/No heavy lifting/straining/Walking - Outdoors allowed/Follow Instructions Provided by your Surgical Team

## 2021-12-08 NOTE — PRE-OP CHECKLIST - LATEX ALLERGY
Go directly to [facility] for alcohol detox.        Hypertension  Hypertension, commonly called high blood pressure, is when the force of blood pumping through your arteries is too strong. Your arteries are the blood vessels that carry blood from your heart throughout your body. A blood pressure reading consists of a higher number over a lower number, such as 110/72. The higher number (systolic) is the pressure inside your arteries when your heart pumps. The lower number (diastolic) is the pressure inside your arteries when your heart relaxes. Ideally you want your blood pressure below 120/80.  Hypertension forces your heart to work harder to pump blood. Your arteries may become narrow or stiff. Having untreated or uncontrolled hypertension can cause heart attack, stroke, kidney disease, and other problems.  What increases the risk?  Some risk factors for high blood pressure are controllable. Others are not.  Risk factors you cannot control include:  · Race. You may be at higher risk if you are .  · Age. Risk increases with age.  · Gender. Men are at higher risk than women before age 45 years. After age 65, women are at higher risk than men.  Risk factors you can control include:  · Not getting enough exercise or physical activity.  · Being overweight.  · Getting too much fat, sugar, calories, or salt in your diet.  · Drinking too much alcohol.  What are the signs or symptoms?  Hypertension does not usually cause signs or symptoms. Extremely high blood pressure (hypertensive crisis) may cause headache, anxiety, shortness of breath, and nosebleed.  How is this diagnosed?  To check if you have hypertension, your health care provider will measure your blood pressure while you are seated, with your arm held at the level of your heart. It should be measured at least twice using the same arm. Certain conditions can cause a difference in blood pressure between your right and left arms. A blood  pressure reading that is higher than normal on one occasion does not mean that you need treatment. If it is not clear whether you have high blood pressure, you may be asked to return on a different day to have your blood pressure checked again. Or, you may be asked to monitor your blood pressure at home for 1 or more weeks.  How is this treated?  Treating high blood pressure includes making lifestyle changes and possibly taking medicine. Living a healthy lifestyle can help lower high blood pressure. You may need to change some of your habits.  Lifestyle changes may include:  · Following the DASH diet. This diet is high in fruits, vegetables, and whole grains. It is low in salt, red meat, and added sugars.  · Keep your sodium intake below 2,300 mg per day.  · Getting at least 30-45 minutes of aerobic exercise at least 4 times per week.  · Losing weight if necessary.  · Not smoking.  · Limiting alcoholic beverages.  · Learning ways to reduce stress.  Your health care provider may prescribe medicine if lifestyle changes are not enough to get your blood pressure under control, and if one of the following is true:  · You are 18-59 years of age and your systolic blood pressure is above 140.  · You are 60 years of age or older, and your systolic blood pressure is above 150.  · Your diastolic blood pressure is above 90.  · You have diabetes, and your systolic blood pressure is over 140 or your diastolic blood pressure is over 90.  · You have kidney disease and your blood pressure is above 140/90.  · You have heart disease and your blood pressure is above 140/90.  Your personal target blood pressure may vary depending on your medical conditions, your age, and other factors.  Follow these instructions at home:  · Have your blood pressure rechecked as directed by your health care provider.  · Take medicines only as directed by your health care provider. Follow the directions carefully. Blood pressure medicines must be taken as  prescribed. The medicine does not work as well when you skip doses. Skipping doses also puts you at risk for problems.  · Do not smoke.  · Monitor your blood pressure at home as directed by your health care provider.  Contact a health care provider if:  · You think you are having a reaction to medicines taken.  · You have recurrent headaches or feel dizzy.  · You have swelling in your ankles.  · You have trouble with your vision.  Get help right away if:  · You develop a severe headache or confusion.  · You have unusual weakness, numbness, or feel faint.  · You have severe chest or abdominal pain.  · You vomit repeatedly.  · You have trouble breathing.  This information is not intended to replace advice given to you by your health care provider. Make sure you discuss any questions you have with your health care provider.  Document Released: 12/18/2006 Document Revised: 05/25/2017 Document Reviewed: 10/10/2014  [employer] Interactive Patient Education © 2017 [employer]          Alcohol Withdrawal  Alcohol withdrawal is a group of symptoms that can develop when a person who drinks heavily and regularly stops drinking or drinks less.  What are the causes?  Heavy and regular drinking can cause chemicals that send signals from the brain to the body (neurotransmitters) to deactivate. Alcohol withdrawal develops when deactivated neurotransmitters reactivate because a person stops drinking or drinks less.  What increases the risk?  The more a person drinks and the longer he or she drinks, the greater the risk of alcohol withdrawal. Severe withdrawal is more likely to develop in someone who:  · Had severe alcohol withdrawal in the past.  · Had a seizure during a previous episode of alcohol withdrawal.  · Is elderly.  · Is pregnant.  · Has been abusing drugs.  · Has other medical problems, including:  ¨ Infection.  ¨ Heart, lung, or liver disease.  ¨ Seizures.  ¨ Mental health problems.  What are the signs or  symptoms?  Symptoms of this condition can be mild to moderate, or they can be severe.  Mild to moderate symptoms may include:  · Fatigue.  · Nightmares.  · Trouble sleeping.  · Depression.  · Anxiety.  · Inability to think clearly.  · Mood swings.  · Irritability.  · Loss of appetite.  · Nausea or vomiting.  · Clammy skin.  · Extreme sweating.  · Rapid heartbeat.  · Shakiness.  · Uncontrollable shaking (tremor).  Severe symptoms may include:  · Fever.  · Seizures.  · Severe confusion.  · Feeling or seeing things that are not there (hallucinations).  Symptoms usually begin within eight hours after a person stops drinking or drinks less. They can last for weeks.  How is this diagnosed?  Alcohol withdrawal is diagnosed with a medical history and physical exam. Sometimes, urine and blood tests are also done.  How is this treated?  Treatment may involve:  · Monitoring blood pressure, pulse, and breathing.  · Getting fluids through an IV tube.  · Medicine to reduce anxiety.  · Medicine to prevent or control seizures.  · Multivitamins and B vitamins.  · Having a health care provider check on you daily.  If symptoms are moderate to severe or if there is a risk of severe withdrawal, treatment may be done at a hospital or treatment center.  Follow these instructions at home:  · Take medicines and vitamin supplements only as directed by your health care provider.  · Do not drink alcohol.  · Have someone stay with you or be available if you need help.  · Drink enough fluid to keep your urine clear or pale yellow.  · Consider joining a 12-step program or another alcohol support group.  Contact a health care provider if:  · Your symptoms get worse or do not go away.  · You cannot keep food or water in your stomach.  · You are struggling with not drinking alcohol.  · You cannot stop drinking alcohol.  Get help right away if:  · You have an irregular heartbeat.  · You have chest pain.  · You have trouble breathing.  · You have  symptoms of severe withdrawal, such as:  ¨ A fever.  ¨ Seizures.  ¨ Severe confusion.  ¨ Hallucinations.  This information is not intended to replace advice given to you by your health care provider. Make sure you discuss any questions you have with your health care provider.  Document Released: 09/27/2006 Document Revised: 04/26/2017 Document Reviewed: 10/06/2015  [employer] Interactive Patient Education © 2017 [employer]     no

## 2021-12-08 NOTE — DISCHARGE NOTE PROVIDER - NSDCCPGOAL_GEN_ALL_CORE_FT
To get better and follow your care plan as instructed. complains of pain/discomfort To get better and follow your care plan as instructed.    -Continue a low fat diet   -Activity: no heavy lifting or strenuous exercise for one month.  -You may shower but no soaking baths, no swimming pools.  -Notify physician for fever greater than 101, worsening abdominal pain, bleeding or drainage from incision sites.   -Follow-up with Dr. Avendaño 1 week. Call the office to make an appointment.

## 2021-12-08 NOTE — DISCHARGE NOTE PROVIDER - HOSPITAL COURSE
30 y/o F w/ PMH HTN, prediabetes and  morbid obesity s/p robotic assisted duodenal switch on 9/7 with Dr. Avendaño. Patient now presents with progressively worsening RUQ abdominal pain. Patient reports she has had intermittent sharp RUQ abdominal pain that occurs following meals last 2 weeks; Pain normally resolves after  20-30 minutes but today pain is constant; it does not radiate. She has not tried anything for pain and reports nausea without vomiting no abdominal bloating, diarrhea or constipation; no fevers chills, SOB or chest pain. Patient did report one episode of syncope while shopping at [employer]; she was having RUQ pain at that time; it resolved within minutes. She saw Her PCP last week who ordered RUQ ultrasound but has not had it. She has lost 83 pounds since her surgery. No other concerns or complaints.   In the ED, patient afebrile with normal VS. Labs with normal WBC of 8.95, Hg of 11.6, TB 0.3, and normal LFT's. CT with  normal gallbladder and Hepatomegaly and steatosis with subcentimeter hypodensity in the right lobe too small to characterize. Patient was admitted to the Surgical Service. IVF and antibiotics were administered. RUQ u/s was obtained. Patient proceeded to the operating room for laparoscopic cholecystectomy.     **********************  The procedure was uncomplicated. Upon discharge, they are hemodynamically stable, tolerating diet, with pain well controlled. They are deemed safe for discharge with follow up with the surgeon.   32 y/o F w/ PMH HTN, prediabetes and  morbid obesity s/p robotic assisted duodenal switch on 9/7 with Dr. Avendaño. Patient now presents with progressively worsening RUQ abdominal pain. Patient reports she has had intermittent sharp RUQ abdominal pain that occurs following meals last 2 weeks; Pain normally resolves after  20-30 minutes but today pain is constant; it does not radiate. She has not tried anything for pain and reports nausea without vomiting no abdominal bloating, diarrhea or constipation; no fevers chills, SOB or chest pain. Patient did report one episode of syncope while shopping at [employer]; she was having RUQ pain at that time; it resolved within minutes. She saw Her PCP last week who ordered RUQ ultrasound but has not had it. She has lost 83 pounds since her surgery. No other concerns or complaints.   In the ED, patient afebrile with normal VS. Labs with normal WBC of 8.95, Hg of 11.6, TB 0.3, and normal LFT's. CT with  normal gallbladder and Hepatomegaly and steatosis with subcentimeter hypodensity in the right lobe too small to characterize. Patient was admitted to the Surgical Service. IVF and antibiotics were administered. RUQ u/s was obtained. Patient proceeded to the operating room for laparoscopic cholecystectomy. The procedure was uncomplicated. Upon discharge, they are hemodynamically stable, tolerating diet, with pain well controlled. They are deemed safe for discharge with follow up with the surgeon.   32 y/o F w/ PMH HTN, prediabetes and  morbid obesity s/p robotic assisted duodenal switch on 9/7 with Dr. Avendaño. Patient now presents with progressively worsening RUQ abdominal pain. Patient reports she has had intermittent sharp RUQ abdominal pain that occurs following meals last 2 weeks; Pain normally resolves after  20-30 minutes but today pain is constant; it does not radiate. She has not tried anything for pain and reports nausea without vomiting no abdominal bloating, diarrhea or constipation; no fevers chills, SOB or chest pain. Patient did report one episode of syncope while shopping at [employer]; she was having RUQ pain at that time; it resolved within minutes. She saw Her PCP last week who ordered RUQ ultrasound but has not had it. She has lost 83 pounds since her surgery. No other concerns or complaints.   In the ED, patient afebrile with normal VS. Labs with normal WBC of 8.95, Hg of 11.6, TB 0.3, and normal LFT's. CT with  normal gallbladder and Hepatomegaly and steatosis with subcentimeter hypodensity in the right lobe too small to characterize. Patient was admitted to the Surgical Service. IVF and antibiotics were administered. RUQ u/s was obtained. Patient proceeded to the operating room for laparoscopic cholecystectomy. The procedure was uncomplicated. Upon discharge, they are hemodynamically stable, tolerating diet, with pain well controlled. They are deemed safe for discharge with follow up with the surgeon.

## 2021-12-08 NOTE — BRIEF OPERATIVE NOTE - NSICDXBRIEFPROCEDURE_GEN_ALL_CORE_FT
PROCEDURES:  Laparoscopic cholecystectomy by bariatric surgery 08-Dec-2021 16:27:27  Katie Delacruz

## 2021-12-08 NOTE — PACU DISCHARGE NOTE - AIRWAY PATENCY:
Subjective     CHIEF COMPLAINT:      Chief Complaint   Patient presents with   • Annual Exam     no concerns       HISTORY OF PRESENT ILLNESS:     Lamont Segundo is a 50 y.o. male patient who returns today for follow up on his polycythemia.  He returns today for follow-up reporting no new symptoms.  He is on aspirin 81 mg a day.  He reports intermittent right upper quadrant abdominal pain that started a year and a half ago.  He had workup for the pain including colonoscopy on scans were negative findings.    Patient is on testosterone 200 µg injection every 2 weeks.  He follows with Dr. Navarro of urology every 6 months.  He has follow-up with him in 3 months.    REVIEW OF SYSTEMS:  Review of Systems   Constitutional: Negative for chills, fever and unexpected weight change.   HENT: Negative for mouth sores, nosebleeds, sore throat and voice change.    Eyes: Negative for visual disturbance.   Respiratory: Negative for cough and shortness of breath.    Cardiovascular: Negative for chest pain and leg swelling.   Gastrointestinal: Positive for abdominal pain. Negative for blood in stool, constipation, diarrhea, nausea and vomiting.   Genitourinary: Negative for dysuria, frequency and hematuria.   Musculoskeletal: Negative for arthralgias, back pain and joint swelling.   Skin: Negative for rash.   Neurological: Negative for dizziness, numbness and headaches.   Hematological: Negative for adenopathy. Does not bruise/bleed easily.   Psychiatric/Behavioral: Negative for dysphoric mood. The patient is not nervous/anxious.      I verified the ROS obtained by the MA.        Past Medical History:   Diagnosis Date   • Asthma    • Elevated blood pressure reading without diagnosis of hypertension    • LAVON (generalized anxiety disorder)    • GERD (gastroesophageal reflux disease)    • H/O complete eye exam 2 YEARS   • H/O Lung calcification    • Herpes simplex     lip   • Hypercholesterolemia    • Hyperlipidemia    • IFG (impaired  "fasting glucose)    • Testosterone deficiency        Past Surgical History:   Procedure Laterality Date   • COLONOSCOPY  03/15/2017    NBIH   • ENDOSCOPY  03/15/2017    reactive gastropathy   • LUNG SURGERY  25 YEARS AGO    BILATERAL    • PLEURAL SCARIFICATION     • TONSILLECTOMY         Cancer-related family history includes Cancer in his maternal grandfather; Melanoma in his maternal grandmother.  Social History     Tobacco Use   • Smoking status: Never Smoker   • Smokeless tobacco: Never Used   Substance Use Topics   • Alcohol use: Yes     Comment: rare       MEDICATIONS:    Current Outpatient Medications:   •  aspirin 81 MG EC tablet, Take 81 mg by mouth Daily., Disp: , Rfl:   •  cetirizine (zyrTEC) 10 MG tablet, Take 10 mg by mouth Daily., Disp: , Rfl:   •  fenofibrate 160 MG tablet, Take 1 tablet by mouth Daily., Disp: 90 tablet, Rfl: 3  •  omeprazole (priLOSEC) 20 MG capsule, Take 20 mg by mouth Daily., Disp: , Rfl:   •  rosuvastatin (CRESTOR) 5 MG tablet, Take 1 tablet by mouth Daily., Disp: 90 tablet, Rfl: 3  •  Testosterone Cypionate (DEPOTESTOTERONE CYPIONATE) 200 MG/ML injection, , Disp: , Rfl:   •  valACYclovir (VALTREX) 1000 MG tablet, Take 1 tablet by mouth 2 (two) times a day. (Patient taking differently: Take 1,000 mg by mouth As Needed.), Disp: 14 tablet, Rfl: 11    ALLERGIES:  No Known Allergies      Objective   VITAL SIGNS:     Vitals:    01/17/19 0815   BP: 137/90   Pulse: 92   Resp: 16   Temp: 98.8 °F (37.1 °C)   TempSrc: Oral   SpO2: 93%   Weight: 107 kg (235 lb 9.6 oz)   Height: 181 cm (71.26\")  Comment: new ht   PainSc: 0-No pain     Body mass index is 32.62 kg/m².     Wt Readings from Last 3 Encounters:   01/17/19 107 kg (235 lb 9.6 oz)   12/27/18 104 kg (230 lb)   07/18/18 95.7 kg (211 lb)       PHYSICAL EXAMINATION:  GENERAL:  The patient appears in good general condition, not in acute distress.  SKIN: Warm and dry. No skin rashes, ecchymosis or petechiae.  HEAD:  Normocephalic.  EYES:  " No Jaundice. No Pallor.   CHEST: Normal respiratory effort.  ABDOMEN:  Soft. No tenderness. No Hepatomegaly. No Splenomegaly. No masses.      DIAGNOSTIC DATA:     Results from last 7 days   Lab Units 01/17/19  0737   WBC 10*3/mm3 6.79   NEUTROS ABS 10*3/mm3 3.34   HEMOGLOBIN g/dL 16.8*   HEMATOCRIT % 48.4   PLATELETS 10*3/mm3 226     Component      Latest Ref Rng & Units 12/1/2017 1/15/2018 7/20/2018 1/17/2019   Immature Grans, Absolute      0.00 - 0.03 10*3/mm3 0.05 (H) 0.09 (H) 0.07 (H) 0.08 (H)       Assessment/Plan   Secondary Polycythemia.   · Hemoglobin was normal in 2015 and it increased following the patient being placed on testosterone replacement.    · Testing for EREN-2 MUTATION was negative for the V617F and for exon 12 deletion.  · Patient wanted to continue with Testosterone replacement.   · Therapeutic phlebotomy recommended for HCT >50%      PLAN:    1.  The patient does not need a therapeutic phlebotomy this point.  I recommended continued monitoring of his CBC every 3-4 months.  Since done through his PCP and urologist.    2.  Continue aspirin 81 mg a day.    3.  Return for follow-up in one year with a CBC.        Alexsander Kong MD  01/17/19             Satisfactory

## 2021-12-08 NOTE — DISCHARGE NOTE PROVIDER - NSDCMRMEDTOKEN_GEN_ALL_CORE_FT
Multiple Vitamins oral tablet: 1 tab(s) orally once a day   Multiple Vitamins oral tablet: 1 tab(s) orally once a day  omeprazole 40 mg oral delayed release capsule: 1 cap(s) orally once a day MDD:1 capsule  oxyCODONE 5 mg oral tablet: 1 tab(s) orally every 6 hours, As Needed -for severe pain MDD:4

## 2021-12-08 NOTE — DISCHARGE NOTE PROVIDER - NSDCCPCAREPLAN_GEN_ALL_CORE_FT
PRINCIPAL DISCHARGE DIAGNOSIS  Diagnosis: Abdominal pain  Assessment and Plan of Treatment:        PRINCIPAL DISCHARGE DIAGNOSIS  Diagnosis: Abdominal pain  Assessment and Plan of Treatment: 30 y/o F w/ PMH HTN, prediabetes and  morbid obesity s/p robotic assisted duodenal switch on 9/7 with Dr. Avendaño. Patient now presents with progressively worsening RUQ abdominal pain. Patient reports she has had intermittent sharp RUQ abdominal pain that occurs following meals last 2 weeks; Pain normally resolves after  20-30 minutes but today pain is constant; it does not radiate. She has not tried anything for pain and reports nausea without vomiting no abdominal bloating, diarrhea or constipation; no fevers chills, SOB or chest pain. Patient did report one episode of syncope while shopping at [employer]; she was having RUQ pain at that time; it resolved within minutes. She saw Her PCP last week who ordered RUQ ultrasound but has not had it. She has lost 83 pounds since her surgery. No other concerns or complaints.   In the ED, patient afebrile with normal VS. Labs with normal WBC of 8.95, Hg of 11.6, TB 0.3, and normal LFT's. CT with  normal gallbladder and Hepatomegaly and steatosis with subcentimeter hypodensity in the right lobe too small to characterize. Patient was admitted to the Surgical Service. IVF and antibiotics were administered. RUQ u/s was obtained. Patient proceeded to the operating room for laparoscopic cholecystectomy. The procedure was uncomplicated. Upon discharge, they are hemodynamically stable, tolerating diet, with pain well controlled. They are deemed safe for discharge with follow up with the surgeon.  -Continue a low fat diet   -Activity: no heavy lifting or strenuous exercise for one month.  -You may shower but no soaking baths, no swimming pools.  -Notify physician for fever greater than 101, worsening abdominal pain, bleeding or drainage from incision sites.   -Follow-up with Dr. Avendaño 1 week. Call the office to make an appointment.

## 2021-12-08 NOTE — DISCHARGE NOTE PROVIDER - NSDCFUADDINST_GEN_ALL_CORE_FT
- You may resume a regular diet as tolerated  - You may shower, let the water run over the dressings, but refrain from excessive scrubbing, or soaking  - Please refrain from heavy lifting >20 lbs for six weeks following discharge  - You may take Tylenol, 1 gram every 6 hours for pain. Prescriptions for additional pain medication has been sent to your pharmacy. Only take for breakthrough pain  - Please follow up with the surgeon in the office, call for an appointment       -Continue a low fat diet   -Activity: no heavy lifting or strenuous exercise for one month.  -You may shower but no soaking baths, no swimming pools.  -Notify physician for fever greater than 101, worsening abdominal pain, bleeding or drainage from incision sites.   -Follow-up with Dr. Avendaño 1 week. Call the office to make an appointment.

## 2021-12-08 NOTE — PATIENT PROFILE ADULT - FALL HARM RISK - UNIVERSAL INTERVENTIONS
Bed in lowest position, wheels locked, appropriate side rails in place/Call bell, personal items and telephone in reach/Instruct patient to call for assistance before getting out of bed or chair/Non-slip footwear when patient is out of bed/[place] to call system/Physically safe environment - no spills, clutter or unnecessary equipment/Purposeful Proactive Rounding/Room/bathroom lighting operational, light cord in reach

## 2021-12-09 ENCOUNTER — TRANSCRIPTION ENCOUNTER (OUTPATIENT)
Age: 31
End: 2021-12-09

## 2021-12-09 VITALS
DIASTOLIC BLOOD PRESSURE: 82 MMHG | SYSTOLIC BLOOD PRESSURE: 131 MMHG | OXYGEN SATURATION: 95 % | TEMPERATURE: 98 F | RESPIRATION RATE: 17 BRPM | HEART RATE: 70 BPM

## 2021-12-09 LAB
ANION GAP SERPL CALC-SCNC: 10 MMOL/L — SIGNIFICANT CHANGE UP (ref 5–17)
BUN SERPL-MCNC: 4 MG/DL — LOW (ref 7–23)
CALCIUM SERPL-MCNC: 9.6 MG/DL — SIGNIFICANT CHANGE UP (ref 8.4–10.5)
CHLORIDE SERPL-SCNC: 105 MMOL/L — SIGNIFICANT CHANGE UP (ref 96–108)
CO2 SERPL-SCNC: 23 MMOL/L — SIGNIFICANT CHANGE UP (ref 22–31)
CREAT SERPL-MCNC: 0.62 MG/DL — SIGNIFICANT CHANGE UP (ref 0.5–1.3)
GLUCOSE SERPL-MCNC: 106 MG/DL — HIGH (ref 70–99)
HCT VFR BLD CALC: 31.6 % — LOW (ref 34.5–45)
HGB BLD-MCNC: 10.3 G/DL — LOW (ref 11.5–15.5)
MAGNESIUM SERPL-MCNC: 1.7 MG/DL — SIGNIFICANT CHANGE UP (ref 1.6–2.6)
MCHC RBC-ENTMCNC: 26.3 PG — LOW (ref 27–34)
MCHC RBC-ENTMCNC: 32.6 GM/DL — SIGNIFICANT CHANGE UP (ref 32–36)
MCV RBC AUTO: 80.8 FL — SIGNIFICANT CHANGE UP (ref 80–100)
NRBC # BLD: 0 /100 WBCS — SIGNIFICANT CHANGE UP (ref 0–0)
PHOSPHATE SERPL-MCNC: 4.3 MG/DL — SIGNIFICANT CHANGE UP (ref 2.5–4.5)
PLATELET # BLD AUTO: 239 K/UL — SIGNIFICANT CHANGE UP (ref 150–400)
POTASSIUM SERPL-MCNC: 4.1 MMOL/L — SIGNIFICANT CHANGE UP (ref 3.5–5.3)
POTASSIUM SERPL-SCNC: 4.1 MMOL/L — SIGNIFICANT CHANGE UP (ref 3.5–5.3)
RBC # BLD: 3.91 M/UL — SIGNIFICANT CHANGE UP (ref 3.8–5.2)
RBC # FLD: 18.4 % — HIGH (ref 10.3–14.5)
SODIUM SERPL-SCNC: 138 MMOL/L — SIGNIFICANT CHANGE UP (ref 135–145)
WBC # BLD: 8.84 K/UL — SIGNIFICANT CHANGE UP (ref 3.8–10.5)
WBC # FLD AUTO: 8.84 K/UL — SIGNIFICANT CHANGE UP (ref 3.8–10.5)

## 2021-12-09 PROCEDURE — 99232 SBSQ HOSP IP/OBS MODERATE 35: CPT

## 2021-12-09 RX ORDER — MAGNESIUM SULFATE 500 MG/ML
1 VIAL (ML) INJECTION ONCE
Refills: 0 | Status: COMPLETED | OUTPATIENT
Start: 2021-12-09 | End: 2021-12-09

## 2021-12-09 RX ORDER — OXYCODONE HYDROCHLORIDE 5 MG/1
1 TABLET ORAL
Qty: 4 | Refills: 0
Start: 2021-12-09

## 2021-12-09 RX ORDER — OMEPRAZOLE 10 MG/1
1 CAPSULE, DELAYED RELEASE ORAL
Qty: 30 | Refills: 0
Start: 2021-12-09 | End: 2022-01-07

## 2021-12-09 RX ADMIN — PANTOPRAZOLE SODIUM 40 MILLIGRAM(S): 20 TABLET, DELAYED RELEASE ORAL at 06:10

## 2021-12-09 RX ADMIN — HEPARIN SODIUM 7500 UNIT(S): 5000 INJECTION INTRAVENOUS; SUBCUTANEOUS at 13:38

## 2021-12-09 RX ADMIN — Medication 15 MILLIGRAM(S): at 00:20

## 2021-12-09 RX ADMIN — Medication 650 MILLIGRAM(S): at 01:17

## 2021-12-09 RX ADMIN — Medication 15 MILLIGRAM(S): at 13:26

## 2021-12-09 RX ADMIN — Medication 15 MILLIGRAM(S): at 06:09

## 2021-12-09 RX ADMIN — Medication 100 GRAM(S): at 07:53

## 2021-12-09 RX ADMIN — Medication 650 MILLIGRAM(S): at 01:50

## 2021-12-09 RX ADMIN — Medication 15 MILLIGRAM(S): at 00:03

## 2021-12-09 RX ADMIN — Medication 500 MILLIGRAM(S): at 06:09

## 2021-12-09 RX ADMIN — Medication 650 MILLIGRAM(S): at 11:41

## 2021-12-09 RX ADMIN — HEPARIN SODIUM 7500 UNIT(S): 5000 INJECTION INTRAVENOUS; SUBCUTANEOUS at 06:09

## 2021-12-09 RX ADMIN — Medication 500 MILLIGRAM(S): at 11:44

## 2021-12-09 RX ADMIN — Medication 15 MILLIGRAM(S): at 06:40

## 2021-12-09 RX ADMIN — SODIUM CHLORIDE 150 MILLILITER(S): 9 INJECTION, SOLUTION INTRAVENOUS at 05:22

## 2021-12-09 NOTE — PROGRESS NOTE ADULT - ASSESSMENT
31 year old female with history of gastric bypass with Dr. Avendaño (Sept 2021), Hx of HTN, pre-DM presents to ED with concern for right sided abdominal pain over the past 1-2 weeks. GI consulted for abdominal pain.    #Abdominal Pain  Patient presenting to ED after reporting 2 week history of abdominal pain, localized to RUQ. Notes initially intermittent in nature, occurring every 1-2 days initially, now progressing to constant pain. Notes a syncopal episode 2 days prior. Pain localized to RUQ, also with positive Lanier sign on exam. Labs unremarkable.  Presentation likely 2/2 biliary dyskinesia given abdominal US findings notable for biliary sludge. No e/o cholecystitis.  -Abdominal US (12/7) - Hepatic steatosis. Small gallbladder sludge. No evidence of cholecystitis.  -CT A/P (12/7) - Hepatomegaly and steatosis. Subcentimeter hypodensity in the right lobe too small to characterize. Post surgical changes c/w prior sleeve gastrectomy.    -s/p lap cholecystectomy (12/8)  -Pain control as per primary team, would avoid NSAIDs  -Remainder of care as per primary team    Case discussed with c attending and primary team.     Ifeoma Rojas DO  Gastroenterology Fellow  Pager: [phone]
30 y/o F w/ PMH HTN, prediabetes and morbid obesity s/p robotic assisted duodenal switch on 9/7 with Dr. Avendaño, now presents with intermittent sharp RUQ abdominal pain following meals last 2 weeks without fevers, chills, N/V, abd bloating; currently afebrile with normal VS. Labs with normal WBC of 8.95, Hg of 11.6, TB 0.3, and normal LFT's and RUQ with biliary sludge, normal CBD caliber; concerning for Biliary colic. Admitted 12/7.    plan:   NPO/IVF/OOB/IS  pain/nausea control  continue Ceftriaxone and flagyl  am labs  ppx scd, heparin   
31 year old female with history of gastric bypass with Dr. Avendaño (Sept 2021), Hx of HTN, pre-DM presents to ED with concern for right sided abdominal pain over the past 1-2 weeks. GI consulted for abdominal pain.    #Abdominal Pain  Patient presenting to ED after reporting 2 week history of abdominal pain, localized to RUQ. Notes initially intermittent in nature, occurring every 1-2 days initially, now progressing to constant pain. Notes a syncopal episode 2 days prior. Pain localized to RUQ, also with positive Lanier sign on exam. Labs unremarkable.  Presentation likely 2/2 biliary dyskinesia given abdominal US findings notable for biliary sludge. No e/o cholecystitis.  -Abdominal US (12/7) - Hepatic steatosis. Small gallbladder sludge. No evidence of cholecystitis.  -CT A/P (12/7) - Hepatomegaly and steatosis. Subcentimeter hypodensity in the right lobe too small to characterize. Post surgical changes c/w prior sleeve gastrectomy.    -Pain control as per primary team, would avoid NSAIDs  -Remainder of care as per primary team    Case [place] [facility] [place] primary team.     Ifeoma Rojas DO  Gastroenterology Fellow  Pager: [phone]
32 y/o F w/ PMH HTN, prediabetes and morbid obesity s/p robotic assisted duodenal switch on 9/7 with Dr. Avendaño, now presents with intermittent sharp RUQ abdominal pain following meals last 2 weeks without fevers, chills, N/V, abd bloating; currently afebrile with normal VS. Labs with normal WBC of 8.95, Hg of 11.6, TB 0.3, and normal LFT's and RUQ with biliary sludge, normal CBD caliber; concerning for Biliary colic. Admitted 12/7. s/p lap chula 12/8 (non-perf, non-gang)    plan:   LFD/IVF/OOB/IS  pain/nausea control  continue Ceftriaxone and flagyl  am labs  ppx scd, heparin   
32 y/o F w/ PMH HTN, prediabetes and morbid obesity s/p robotic assisted duodenal switch on 9/7 with Dr. Avendaño, now presents with intermittent sharp RUQ abdominal pain following meals last 2 weeks without fevers, chills, N/V, abd bloating; currently afebrile with normal VS. Labs with normal WBC of 8.95, Hg of 11.6, TB 0.3, and normal LFT's and RUQ with biliary sludge, normal CBD caliber; concerning for Biliary colic. Admitted 12/7. s/p lap chula 12/8 (non-perf, non-gang)    plan:   LFD/IVF/OOB/IS  pain/nausea control  continue Ceftriaxone and flagyl  am labs  ppx scd, heparin   f/u am labs  dc this afternoon

## 2021-12-09 NOTE — PROGRESS NOTE ADULT - SUBJECTIVE AND OBJECTIVE BOX
GASTROENTEROLOGY PROGRESS NOTE  Patient seen and examined at bedside. Admitted to surgical service overnight. US notable for biliary sludge. VSS    ROS: Patient reports no nausea/vomiting, still noting RUQ abdominal pain, constant, worse with palpation of the area. Notes that her last BM was yesterday morning.     PERTINENT REVIEW OF SYSTEMS:  CONSTITUTIONAL: No weakness, fevers or chills  HEENT: No visual changes; No vertigo or throat pain   GASTROINTESTINAL: As above.  NEUROLOGICAL: No numbness or weakness  SKIN: No itching, burning, rashes, or lesions     Allergies    No Known Allergies    Intolerances      MEDICATIONS:  MEDICATIONS  (STANDING):  cefTRIAXone   IVPB 1000 milliGRAM(s) IV Intermittent every 24 hours  heparin   Injectable 7500 Unit(s) SubCutaneous every 8 hours  ketorolac   Injectable 15 milliGRAM(s) IV Push every 6 hours  lactated ringers. 1000 milliLiter(s) (150 mL/Hr) IV Continuous <Continuous>  metroNIDAZOLE    Tablet 500 milliGRAM(s) Oral every 8 hours  pantoprazole  Injectable 40 milliGRAM(s) IV Push every 24 hours  potassium chloride  10 mEq/100 mL IVPB 10 milliEquivalent(s) IV Intermittent every 1 hour  potassium chloride  10 mEq/100 mL IVPB 10 milliEquivalent(s) IV Intermittent every 1 hour    MEDICATIONS  (PRN):  acetaminophen     Tablet .. 650 milliGRAM(s) Oral every 6 hours PRN Mild Pain (1 - 3), Moderate Pain (4 - 6)  ondansetron Injectable 4 milliGRAM(s) IV Push every 6 hours PRN Nausea    Vital Signs Last 24 Hrs  T(C): 36.7 (08 Dec 2021 04:29), Max: 36.9 (07 Dec 2021 13:47)  T(F): 98.1 (08 Dec 2021 04:29), Max: 98.5 (07 Dec 2021 13:47)  HR: 71 (08 Dec 2021 04:29) (63 - 81)  BP: 136/84 (08 Dec 2021 04:29) (117/76 - 142/79)  BP(mean): --  RR: 18 (08 Dec 2021 04:29) (17 - 18)  SpO2: 96% (08 Dec 2021 04:29) (96% - 99%)     @ 07:01  -   @ 07:00  --------------------------------------------------------  IN: 1550 mL / OUT: 150 mL / NET: 1400 mL      PHYSICAL EXAM:    General:  female; sitting in a chair; in no acute distress  HEENT: MMM, conjunctiva and sclera clear  Gastrointestinal: Soft, ; tenderness to palpation in RUQ, +Lanier sign; non-distended; Normal bowel sounds; No rebound or guarding  Extremities: Normal range of motion, No clubbing, cyanosis or edema  Neurological: Alert and oriented x3  Skin: Warm and dry. No obvious rash    LABS:                        10.1   6.62  )-----------( 239      ( 08 Dec 2021 07:23 )             32.0     12-08    138  |  103  |  8   ----------------------------<  82  3.3<L>   |  26  |  0.79    Ca    9.2      08 Dec 2021 07:23  Phos  4.3     12-08  Mg     1.9     12-08    TPro  6.7  /  Alb  3.8  /  TBili  0.3  /  DBili  x   /  AST  25  /  ALT  27  /  AlkPhos  94  12-08          Urinalysis Basic - ( 07 Dec 2021 14:33 )    Color: Yellow / Appearance: Clear / S.020 / pH: x  Gluc: x / Ketone: 15 mg/dL  / Bili: Negative / Urobili: 0.2 E.U./dL   Blood: x / Protein: Trace mg/dL / Nitrite: NEGATIVE   Leuk Esterase: NEGATIVE / RBC: < 5 /HPF / WBC < 5 /HPF   Sq Epi: x / Non Sq Epi: Moderate /HPF / Bacteria: Present /HPF                RADIOLOGY & ADDITIONAL STUDIES:  Reviewed
GASTROENTEROLOGY PROGRESS NOTE  Patient seen and examined at bedside. s/p lap cholecystectomy 12/8.    ROS: Patient notes feeling much better this morning after her surgery. Reports minimal abdominal pain, discomfort noted mainly related to post-op pain. No nausea/vomiting. No BMs or flatus yet.    PERTINENT REVIEW OF SYSTEMS:  CONSTITUTIONAL: No weakness, fevers or chills  HEENT: No visual changes; No vertigo or throat pain   GASTROINTESTINAL: As above.  NEUROLOGICAL: No numbness or weakness  SKIN: No itching, burning, rashes, or lesions     Allergies    No Known Allergies    Intolerances      MEDICATIONS:  MEDICATIONS  (STANDING):  BUpivacaine liposome 1.3% Injectable (no eMAR) 20 milliLiter(s) Local Injection once  cefTRIAXone   IVPB 1000 milliGRAM(s) IV Intermittent every 24 hours  heparin   Injectable 7500 Unit(s) SubCutaneous every 8 hours  ketorolac   Injectable 15 milliGRAM(s) IV Push every 6 hours  lactated ringers. 1000 milliLiter(s) (150 mL/Hr) IV Continuous <Continuous>  metroNIDAZOLE    Tablet 500 milliGRAM(s) Oral every 8 hours  pantoprazole  Injectable 40 milliGRAM(s) IV Push every 24 hours    MEDICATIONS  (PRN):  acetaminophen     Tablet .. 650 milliGRAM(s) Oral every 6 hours PRN Mild Pain (1 - 3), Moderate Pain (4 - 6)  ondansetron Injectable 4 milliGRAM(s) IV Push every 6 hours PRN Nausea    Vital Signs Last 24 Hrs  T(C): 36.7 (09 Dec 2021 13:38), Max: 37.1 (08 Dec 2021 21:06)  T(F): 98.1 (09 Dec 2021 13:38), Max: 98.7 (08 Dec 2021 21:06)  HR: 70 (09 Dec 2021 13:38) (68 - 96)  BP: 131/82 (09 Dec 2021 13:38) (105/64 - 142/73)  BP(mean): 93 (09 Dec 2021 08:40) (78 - 100)  RR: 17 (09 Dec 2021 13:38) (14 - 22)  SpO2: 95% (09 Dec 2021 13:38) (94% - 99%)    12-08 @ 07:01  -  12-09 @ 07:00  --------------------------------------------------------  IN: 3270 mL / OUT: 3550 mL / NET: -280 mL    12-09 @ 07:01  -  12-09 @ 16:37  --------------------------------------------------------  IN: 280 mL / OUT: 100 mL / NET: 180 mL      PHYSICAL EXAM:    General:  female; sitting in a chair; in no acute distress  HEENT: MMM, conjunctiva and sclera clear  Gastrointestinal: Soft, nontender; non-distended; healing trochar sites noted; Normal bowel sounds; No rebound or guarding  Extremities: Normal range of motion, No clubbing, cyanosis or edema  Neurological: Alert and oriented x3  Skin: Warm and dry. No obvious rash    LABS:                        10.3   8.84  )-----------( 239      ( 09 Dec 2021 06:50 )             31.6     12-09    138  |  105  |  4<L>  ----------------------------<  106<H>  4.1   |  23  |  0.62    Ca    9.6      09 Dec 2021 06:50  Phos  4.3     12-09  Mg     1.7     12-09    TPro  6.7  /  Alb  3.8  /  TBili  0.3  /  DBili  x   /  AST  25  /  ALT  27  /  AlkPhos  94  12-08                      Culture - Urine (collected 07 Dec 2021 14:56)  Source: Clean Catch Clean Catch (Midstream)  Final Report (08 Dec 2021 09:11):    Specimen appears CONTAMINATED. Lab suggests repeat clean catch specimen.      RADIOLOGY & ADDITIONAL STUDIES:  Reviewed
POST-OPERATIVE NOTE    Procedure: Lap chula     Diagnosis/Indication: Cholelithiases     Surgeon: Kateryna     S: Pt has no complaints. Denies CP, SOB, SALES, calf tenderness. Pain controlled with medication.    O:  T(C): --  T(F): --  HR: 79 (12-08-21 @ 17:31) (73 - 96)  BP: 139/69 (12-08-21 @ 17:31) (137/71 - 142/73)  RR: 15 (12-08-21 @ 17:31) (14 - 27)  SpO2: 98% (12-08-21 @ 17:31) (95% - 98%)  Wt(kg): --                        10.1   6.62  )-----------( 239      ( 08 Dec 2021 07:23 )             32.0     12-08    138  |  103  |  8   ----------------------------<  82  3.3<L>   |  26  |  0.79    Ca    9.2      08 Dec 2021 07:23  Phos  4.3     12-08  Mg     1.9     12-08    TPro  6.7  /  Alb  3.8  /  TBili  0.3  /  DBili  x   /  AST  25  /  ALT  27  /  AlkPhos  94  12-08      Gen: NAD, resting comfortably in bed  C/V: NSR  Pulm: Nonlabored breathing, no respiratory distress  Abd: soft, non distended , TTP around incision site, incision clean dry and intact   Extrem: WWP, no calf edema, SCDs in place      A/P: 31yFemale s/p above procedure  Diet:  IVF:  Pain/nausea control  DVT ppx:  Dispo plan:
INTERVAL HPI/OVERNIGHT EVENTS:  Admitted for concern for biliary colic, pain controlled, 500cc bolus of LR for low urine o/p, vss     SUBJECTIVE: Patient examined bedside with chief resident. Patient complains of diffuse abdominal pain. She reports that her has improved since coming into the hospital. Patient reports she is ambulating and using incentive spirometer. Patient denies SOB, chest pain, nausea and emesis. Patient making adequate urine output.    cefTRIAXone   IVPB 1000 milliGRAM(s) IV Intermittent every 24 hours  heparin   Injectable 7500 Unit(s) SubCutaneous every 8 hours  metroNIDAZOLE    Tablet 500 milliGRAM(s) Oral every 8 hours      Vital Signs Last 24 Hrs  T(C): 36.7 (08 Dec 2021 04:29), Max: 36.9 (07 Dec 2021 13:47)  T(F): 98.1 (08 Dec 2021 04:29), Max: 98.5 (07 Dec 2021 13:47)  HR: 71 (08 Dec 2021 04:29) (63 - 81)  BP: 136/84 (08 Dec 2021 04:29) (117/76 - 142/79)  BP(mean): --  RR: 18 (08 Dec 2021 04:29) (17 - 18)  SpO2: 96% (08 Dec 2021 04:29) (96% - 99%)  I&O's Detail    07 Dec 2021 07:01  -  08 Dec 2021 07:00  --------------------------------------------------------  IN:    Lactated Ringers: 900 mL  Total IN: 900 mL    OUT:    Voided (mL): 150 mL  Total OUT: 150 mL    Total NET: 750 mL          General: NAD, resting comfortably in bed  C/V: NSR  Pulm: Nonlabored breathing, no respiratory distress  Abd: soft, non distended ,  diffuse  abdominal tenderness to palpation   Extrem: WWP, no edema, SCDs in place        LABS:                        11.6   8.95  )-----------( 313      ( 07 Dec 2021 14:33 )             36.9     12-07    140  |  104  |  9   ----------------------------<  86  3.5   |  24  |  0.75    Ca    9.6      07 Dec 2021 14:33    TPro  8.1  /  Alb  4.2  /  TBili  0.3  /  DBili  x   /  AST  26  /  ALT  29  /  AlkPhos  113  12-07      Urinalysis Basic - ( 07 Dec 2021 14:33 )    Color: Yellow / Appearance: Clear / S.020 / pH: x  Gluc: x / Ketone: 15 mg/dL  / Bili: Negative / Urobili: 0.2 E.U./dL   Blood: x / Protein: Trace mg/dL / Nitrite: NEGATIVE   Leuk Esterase: NEGATIVE / RBC: < 5 /HPF / WBC < 5 /HPF   Sq Epi: x / Non Sq Epi: Moderate /HPF / Bacteria: Present /HPF      
INTERVAL HPI/OVERNIGHT EVENTS: passed TOV (200cc), raheem, vss    STATUS POST:  lap cholecystectomy    POST OPERATIVE DAY #: 1    SUBJECTIVE:  pt seen at bedside, feeling better. denies nausea/vomiting. tolerating diet    MEDICATIONS  (STANDING):  BUpivacaine liposome 1.3% Injectable (no eMAR) 20 milliLiter(s) Local Injection once  cefTRIAXone   IVPB 1000 milliGRAM(s) IV Intermittent every 24 hours  heparin   Injectable 7500 Unit(s) SubCutaneous every 8 hours  ketorolac   Injectable 15 milliGRAM(s) IV Push every 6 hours  lactated ringers. 1000 milliLiter(s) (150 mL/Hr) IV Continuous <Continuous>  metroNIDAZOLE    Tablet 500 milliGRAM(s) Oral every 8 hours  pantoprazole  Injectable 40 milliGRAM(s) IV Push every 24 hours    MEDICATIONS  (PRN):  acetaminophen     Tablet .. 650 milliGRAM(s) Oral every 6 hours PRN Mild Pain (1 - 3), Moderate Pain (4 - 6)  ondansetron Injectable 4 milliGRAM(s) IV Push every 6 hours PRN Nausea      Vital Signs Last 24 Hrs  T(C): 36.7 (09 Dec 2021 04:51), Max: 37.1 (08 Dec 2021 21:06)  T(F): 98 (09 Dec 2021 04:51), Max: 98.7 (08 Dec 2021 21:06)  HR: 81 (09 Dec 2021 04:51) (64 - 96)  BP: 121/70 (09 Dec 2021 04:51) (105/64 - 142/73)  BP(mean): 87 (09 Dec 2021 04:51) (78 - 100)  RR: 17 (09 Dec 2021 04:51) (14 - 22)  SpO2: 94% (09 Dec 2021 04:51) (94% - 99%)    PHYSICAL EXAM:      Constitutional: A&Ox3    Respiratory: non labored breathing, no respiratory distress    Cardiovascular: NSR, RRR    Gastrointestinal: soft, nondistended, mild incisional tenderness, incisions c/d/i    Extremities: (-) edema                  I&O's Detail    07 Dec 2021 07:01  -  08 Dec 2021 07:00  --------------------------------------------------------  IN:    Lactated Ringers: 1050 mL    Lactated Ringers Bolus: 500 mL  Total IN: 1550 mL    OUT:    Voided (mL): 150 mL  Total OUT: 150 mL    Total NET: 1400 mL      08 Dec 2021 07:01  -  09 Dec 2021 06:42  --------------------------------------------------------  IN:    Lactated Ringers: 2850 mL    Oral Fluid: 420 mL  Total IN: 3270 mL    OUT:    Voided (mL): 3550 mL  Total OUT: 3550 mL    Total NET: -280 mL          LABS:                        10.1   6.62  )-----------( 239      ( 08 Dec 2021 07:23 )             32.0     12-08    138  |  103  |  8   ----------------------------<  82  3.3<L>   |  26  |  0.79    Ca    9.2      08 Dec 2021 07:23  Phos  4.3     12-08  Mg     1.9     12-08    TPro  6.7  /  Alb  3.8  /  TBili  0.3  /  DBili  x   /  AST  25  /  ALT  27  /  AlkPhos  94  12-08      Urinalysis Basic - ( 07 Dec 2021 14:33 )    Color: Yellow / Appearance: Clear / S.020 / pH: x  Gluc: x / Ketone: 15 mg/dL  / Bili: Negative / Urobili: 0.2 E.U./dL   Blood: x / Protein: Trace mg/dL / Nitrite: NEGATIVE   Leuk Esterase: NEGATIVE / RBC: < 5 /HPF / WBC < 5 /HPF   Sq Epi: x / Non Sq Epi: Moderate /HPF / Bacteria: Present /HPF        RADIOLOGY & ADDITIONAL STUDIES:

## 2021-12-09 NOTE — DISCHARGE NOTE NURSING/CASE MANAGEMENT/SOCIAL WORK - NSTOBACCONEVERSMOKERY/N_GEN_A
Health Maintenance Due   Topic Date Due   • HPV (Female) Vaccine (1 - Female 3-dose series) 06/07/2005   • Pneumococcal 19-64 Medium Risk (1 of 1 - PPSV23) 06/07/2013   • Influenza Vaccine (1) 09/01/2018       Patient is due for topics as listed above but is not proceeding with Immunization(s) HPV, Influenza and Pneumococcal at this time. Education provided for Immunization(s) Influenza, IPV and Pneumococcal             No

## 2021-12-09 NOTE — DISCHARGE NOTE NURSING/CASE MANAGEMENT/SOCIAL WORK - NSDCPEFALRISK_GEN_ALL_CORE
For information on Fall & Injury Prevention, visit: [email or web address] OR  [email or web address] OR  https://www.cdc.gov/steadi/patient.html

## 2021-12-09 NOTE — DISCHARGE NOTE NURSING/CASE MANAGEMENT/SOCIAL WORK - WILL THE PATIENT ACCEPT THE PFIZER COVID-19 VACCINE IF ELIGIBLE AND IT IS AVAILABLE?
CHF: preserved ejection fraction on Lasix 40 mg twice a day // also now on losartan 50 mg per day ( lisinopril caused cough) // and metoprolol 50 mg per day and aspirin   5-6-20      SVT / chest pain / and abnormal EKG: hospital  8-16-16  : placed no metoprolol and Eliquis. much better had the ablation on 9-1-16 at [facility] with Dr. Lian Blackwood. will need a follow up with Dr. Blackwood / [place] // 8-28-18  // now seeing dr renetta calvillo     5-6-20      atrial fib warfarin is  for atrial fib is a long term proposition at this time  5-6-20      chronic obstructive asthma: proair of needed and prednisone when gets bad on a taper    5-6-20      Diabetes :metformin 1000 mg now twice a day. Also   glipzide 5 mg per day // CHECK SUGARS IN AM BEFORE BREAKFAST : GOAL -140. WRITE LIST OF SUGARS DOWN AND BRING LIST EACH VISIT. Walk as much as possible and diet ( very limited sweets and portion controlled sensible diet). Will need eye doctor yearly. Check feet daily for cuts or sores.  5-6-20      cholesterol : atorvastatin every night advised with low cholesterol diet   5-6-20      Overwgt on exam: Walking at least 30 minutes every day can maintain cardiovascular fitness   5-6-20      Sleep apnea; would help a lot if you used the cpap. but, it is an acquired taste. Need to make sure you get as close to 8 hours you can get so that you do not get excessively tired while you work or drive so you do not get into accidents.  / new cpap sent in on jan 21 2019  //    1-21-19     Sinuses: If needed: Loratadine 10 mg at night as needed for sinuses congestion 4-10-18     Hernia Seen in both groins on ct scan Seen Dr Glasgow on 3-13-13 and asymptomatic so \"watchful waiting\" 5-16-13 WILL MONITOR 4-10-18     Chest pain: was in [place] 6-8 to 6-9-12. Okay. Sent home. then stress test was done 6-14-12 with Dr. valentine was okay : no induceable ischemia. Seems better 6-18-12 Chest pain but good normal stress test 8 sep 15 noted 12-3-15  another hosp for chest pain but stress test normal 3-29-16 // 6-15-16     Abdomen: appendix removed 1-28-11 by Dr Glasgow. Did well.      sciatica to the left : x ray back and hip, tramadol and ortho man 8-28-18     Colonoscopy: 11-8-13 Dr morillo colon - Diverticulosis in the sigmoid colon. Two diminutive polyps in the distal ascending colon. Resected and retrieved. One diminutive polyp in the descending colon. Resected and retrieved. One 3 mm polyp in the descending colon. Resected and retrieved. next in 5 years per Dr. morillo. (Nov 2018) 7-8-14     Flu shot: 11-11-10 declined 10-24-11 and 10-9-14 and 12-3-15 and 10-11-16 and 10-10-17     See me in three months with all your sugars and meds    Not applicable

## 2021-12-09 NOTE — DISCHARGE NOTE NURSING/CASE MANAGEMENT/SOCIAL WORK - PATIENT PORTAL LINK FT
You can access the FollowMyHealth Patient Portal offered by [facility] by registering at the following website: [email or web address]. By joining [facility]’s FollowMyHealth portal, you will also be able to view your health information using other applications (apps) compatible with our system.

## 2021-12-14 DIAGNOSIS — I10 ESSENTIAL (PRIMARY) HYPERTENSION: ICD-10-CM

## 2021-12-14 DIAGNOSIS — R16.0 HEPATOMEGALY, NOT ELSEWHERE CLASSIFIED: ICD-10-CM

## 2021-12-14 DIAGNOSIS — K80.00 CALCULUS OF GALLBLADDER WITH ACUTE CHOLECYSTITIS WITHOUT OBSTRUCTION: ICD-10-CM

## 2021-12-14 DIAGNOSIS — R10.9 UNSPECIFIED ABDOMINAL PAIN: ICD-10-CM

## 2021-12-14 DIAGNOSIS — K76.0 FATTY (CHANGE OF) LIVER, NOT ELSEWHERE CLASSIFIED: ICD-10-CM

## 2021-12-14 DIAGNOSIS — K21.9 GASTRO-ESOPHAGEAL REFLUX DISEASE WITHOUT ESOPHAGITIS: ICD-10-CM

## 2021-12-14 DIAGNOSIS — E66.01 MORBID (SEVERE) OBESITY DUE TO EXCESS CALORIES: ICD-10-CM

## 2021-12-14 DIAGNOSIS — Z98.84 BARIATRIC SURGERY STATUS: ICD-10-CM

## 2021-12-14 DIAGNOSIS — R73.03 PREDIABETES: ICD-10-CM

## 2021-12-15 ENCOUNTER — APPOINTMENT (OUTPATIENT)
Dept: OBGYN | Facility: CLINIC | Age: 31
End: 2021-12-15
Payer: COMMERCIAL

## 2021-12-15 VITALS
DIASTOLIC BLOOD PRESSURE: 76 MMHG | BODY MASS INDEX: 51.04 KG/M2 | WEIGHT: 260 LBS | SYSTOLIC BLOOD PRESSURE: 114 MMHG | HEIGHT: 60 IN

## 2021-12-15 PROCEDURE — 11981 INSERTION DRUG DLVR IMPLANT: CPT

## 2021-12-15 NOTE — PROCEDURE
[IUD Placement] : intrauterine device (IUD) placement [Neg Pregnancy Test] : negative pregnancy test

## 2021-12-15 NOTE — ASSESSMENT
[FreeTextEntry1] : Pt presented for nexplanon insert\par LMP 12/1/21, UPreg neg today\par Has had nexplanon 3x \par Discussed risks benefits alternatives, 3 yr window of use (longer use not yet well studied in weight class)\par Discussed back up method until next menstrual cycle\par Consent signed\par Area marked with marking pen, cleaned with betadine solution\par 5mL 1% lidocaine injected along area of implant\par 11 blade used for initial insertion site\par nexplanon implant inserted along subcutaneous layer\par palpated in correct position after insertion \par patient palpated implant as well\par bandage placed on insertion site after betadine cleaned\par Pt tolerated well, EBL minimal\par Lot #[id number]\par given info card with removal date\par \par

## 2021-12-21 PROCEDURE — 80048 BASIC METABOLIC PNL TOTAL CA: CPT

## 2021-12-21 PROCEDURE — 84702 CHORIONIC GONADOTROPIN TEST: CPT

## 2021-12-21 PROCEDURE — 80053 COMPREHEN METABOLIC PANEL: CPT

## 2021-12-21 PROCEDURE — 85027 COMPLETE CBC AUTOMATED: CPT

## 2021-12-21 PROCEDURE — 36415 COLL VENOUS BLD VENIPUNCTURE: CPT

## 2021-12-21 PROCEDURE — 86901 BLOOD TYPING SEROLOGIC RH(D): CPT

## 2021-12-21 PROCEDURE — 87086 URINE CULTURE/COLONY COUNT: CPT

## 2021-12-21 PROCEDURE — 88304 TISSUE EXAM BY PATHOLOGIST: CPT

## 2021-12-21 PROCEDURE — 76705 ECHO EXAM OF ABDOMEN: CPT

## 2021-12-21 PROCEDURE — 85025 COMPLETE CBC W/AUTO DIFF WBC: CPT

## 2021-12-21 PROCEDURE — 83690 ASSAY OF LIPASE: CPT

## 2021-12-21 PROCEDURE — 74177 CT ABD & PELVIS W/CONTRAST: CPT | Mod: MA

## 2021-12-21 PROCEDURE — 99285 EMERGENCY DEPT VISIT HI MDM: CPT | Mod: 25

## 2021-12-21 PROCEDURE — C9399: CPT

## 2021-12-21 PROCEDURE — 86850 RBC ANTIBODY SCREEN: CPT

## 2021-12-21 PROCEDURE — 81001 URINALYSIS AUTO W/SCOPE: CPT

## 2021-12-21 PROCEDURE — 87635 SARS-COV-2 COVID-19 AMP PRB: CPT

## 2021-12-21 PROCEDURE — 83735 ASSAY OF MAGNESIUM: CPT

## 2021-12-21 PROCEDURE — 86900 BLOOD TYPING SEROLOGIC ABO: CPT

## 2021-12-21 PROCEDURE — 84100 ASSAY OF PHOSPHORUS: CPT

## 2021-12-22 ENCOUNTER — APPOINTMENT (OUTPATIENT)
Dept: SURGERY | Facility: CLINIC | Age: 31
End: 2021-12-22
Payer: COMMERCIAL

## 2021-12-22 VITALS
BODY MASS INDEX: 49.48 KG/M2 | WEIGHT: 252 LBS | TEMPERATURE: 96.1 F | HEART RATE: 77 BPM | OXYGEN SATURATION: 99 % | DIASTOLIC BLOOD PRESSURE: 77 MMHG | SYSTOLIC BLOOD PRESSURE: 111 MMHG | HEIGHT: 60 IN

## 2021-12-22 DIAGNOSIS — Z90.49 ACQUIRED ABSENCE OF OTHER SPECIFIED PARTS OF DIGESTIVE TRACT: ICD-10-CM

## 2021-12-22 LAB — SURGICAL PATHOLOGY STUDY: SIGNIFICANT CHANGE UP

## 2021-12-22 PROCEDURE — 99024 POSTOP FOLLOW-UP VISIT: CPT

## 2021-12-28 DIAGNOSIS — Z01.812 ENCOUNTER FOR PREPROCEDURAL LABORATORY EXAMINATION: ICD-10-CM

## 2021-12-30 NOTE — BRIEF OPERATIVE NOTE - OPERATION/FINDINGS
From: Kristine Reed  To: Dr. Verma Christi: 12/30/2021 8:23 AM EST  Subject: After visit    Still not feeling well this morning. Voice is still raspy, ears still hurt and still feeling fatigued. Took a home covid test, results were negative.
Veres needle insufflation, Abdomen inspected. Robot docked, and targeted. Lesser sac entered. Short gastrics divided with Vessel sealer. Stomach divided over 42Fr bougie, using stapler. 1 green loads, 4 blue loads. The stomach staple line was oversewn with omentum.   300 cm measured retrograde from ileocecal valve into jejunum and anastomosed to first portion of duodenum. Anastomosis checked using methylene blue.   The abdomen was inspected. Hemostasis achieved. Robot undocked. The stomach remnant was removed. Fascia was closed with endoclose suture. skin closed with monocryl

## 2022-01-03 LAB — SARS-COV-2 N GENE NPH QL NAA+PROBE: DETECTED

## 2022-01-03 NOTE — HISTORY OF PRESENT ILLNESS
[de-identified] : Patient is doing well almost 3 months s/p her SIPS. She is eating a proper diet, taking her vitamins and exercising. She complains of some colicky RUQ pain which is worse after eating. She denies fever, nausea or vomiting.

## 2022-01-03 NOTE — REVIEW OF SYSTEMS
[Abdominal Pain] : abdominal pain [Vomiting] : no vomiting [Constipation] : no constipation [Diarrhea] : no diarrhea [Reflux/Heartburn] : no reflux/ heartburn [Hernia] : no hernia [Negative] : Constitutional [FreeTextEntry7] : REY

## 2022-01-13 ENCOUNTER — TRANSCRIPTION ENCOUNTER (OUTPATIENT)
Age: 32
End: 2022-01-13

## 2022-01-14 ENCOUNTER — APPOINTMENT (OUTPATIENT)
Dept: INTERNAL MEDICINE | Facility: CLINIC | Age: 32
End: 2022-01-14
Payer: SELF-PAY

## 2022-01-14 VITALS
TEMPERATURE: 98.1 F | HEART RATE: 80 BPM | DIASTOLIC BLOOD PRESSURE: 80 MMHG | BODY MASS INDEX: 49.67 KG/M2 | RESPIRATION RATE: 12 BRPM | WEIGHT: 253 LBS | SYSTOLIC BLOOD PRESSURE: 125 MMHG | HEIGHT: 60 IN | OXYGEN SATURATION: 100 %

## 2022-01-14 DIAGNOSIS — Z56.6 OTHER PHYSICAL AND MENTAL STRAIN RELATED TO WORK: ICD-10-CM

## 2022-01-14 PROCEDURE — 99214 OFFICE O/P EST MOD 30 MIN: CPT | Mod: GC

## 2022-01-14 SDOH — HEALTH STABILITY - MENTAL HEALTH: OTHER PHYSICAL AND MENTAL STRAIN RELATED TO WORK: Z56.6

## 2022-01-17 PROBLEM — Z90.49 S/P LAPAROSCOPIC CHOLECYSTECTOMY: Status: ACTIVE | Noted: 2022-01-17

## 2022-01-17 PROBLEM — Z56.6 WORK STRESS: Status: ACTIVE | Noted: 2022-01-17

## 2022-01-17 NOTE — ADDENDUM
[FreeTextEntry1] : This note was written by Griselda Alicia on 12/22/2021 acting as scribe for Dr. Wade.

## 2022-01-17 NOTE — HISTORY OF PRESENT ILLNESS
[de-identified] : Liberty Sosa is a 32 y/o F s/p MDS 9/7/21 c/b acute cholecystitis and now s/p lap cholecystectomy 12/8/21. She is doing well overall. No current complaints. Discussed the importance of following recommended diet and exercise regimen. Pt will follow up with Dr. Avendaño as needed.

## 2022-01-17 NOTE — ASSESSMENT
[FreeTextEntry1] : Doing well s/p MDS 9/7/21 c/b acute cholecystitis and now s/p lap cholecystectomy 12/8/21. Pt will follow up with Dr. Avendaño as needed.

## 2022-01-17 NOTE — REASON FOR VISIT
[Post Operative Visit] : a post operative visit for [S/P Bariatric Surgery] : s/p bariatric surgery [Other___] : [unfilled]

## 2022-01-17 NOTE — END OF VISIT
[FreeTextEntry3] : All medical record entries made by the Scribe were at my, Dr. Wade's, discretion and personally dictated by me on 12/22/2021. I have reviewed the chart and agree that the record accurately reflects my personal performance of the history, physical exam, assessment and plan. I have also personally directed, reviewed and agreed to the chart.  [Time Spent: ___ minutes] : I have spent [unfilled] minutes of time on the encounter. [>50% of the face to face encounter time was spent on counseling and/or coordination of care for ___] : Greater than 50% of the face to face encounter time was spent on counseling and/or coordination of care for [unfilled]

## 2022-01-17 NOTE — PHYSICAL EXAM
[Obese] : obese [Normal] : affect appropriate [de-identified] : normal respiration [de-identified] : Incisions clean, well healing, no signs of infection

## 2022-03-24 ENCOUNTER — APPOINTMENT (OUTPATIENT)
Dept: ULTRASOUND IMAGING | Facility: HOSPITAL | Age: 32
End: 2022-03-24
Payer: COMMERCIAL

## 2022-03-24 ENCOUNTER — OUTPATIENT (OUTPATIENT)
Dept: OUTPATIENT SERVICES | Facility: HOSPITAL | Age: 32
LOS: 1 days | End: 2022-03-24
Payer: COMMERCIAL

## 2022-03-24 DIAGNOSIS — Z98.891 HISTORY OF UTERINE SCAR FROM PREVIOUS SURGERY: Chronic | ICD-10-CM

## 2022-03-24 DIAGNOSIS — Z98.84 BARIATRIC SURGERY STATUS: Chronic | ICD-10-CM

## 2022-03-24 PROCEDURE — 76700 US EXAM ABDOM COMPLETE: CPT

## 2022-03-24 PROCEDURE — 76700 US EXAM ABDOM COMPLETE: CPT | Mod: 26

## 2022-03-25 ENCOUNTER — NON-APPOINTMENT (OUTPATIENT)
Age: 32
End: 2022-03-25

## 2022-03-25 LAB
25(OH)D3 SERPL-MCNC: 15.9 NG/ML
ALBUMIN SERPL ELPH-MCNC: 4.3 G/DL
ALP BLD-CCNC: 124 U/L
ALT SERPL-CCNC: 19 U/L
ANION GAP SERPL CALC-SCNC: 12 MMOL/L
AST SERPL-CCNC: 17 U/L
BASOPHILS # BLD AUTO: 0.03 K/UL
BASOPHILS NFR BLD AUTO: 0.5 %
BILIRUB SERPL-MCNC: 0.3 MG/DL
BUN SERPL-MCNC: 9 MG/DL
CALCIUM SERPL-MCNC: 9.4 MG/DL
CALCIUM SERPL-MCNC: 9.4 MG/DL
CHLORIDE SERPL-SCNC: 105 MMOL/L
CHOLEST SERPL-MCNC: 131 MG/DL
CO2 SERPL-SCNC: 24 MMOL/L
CREAT SERPL-MCNC: 0.71 MG/DL
EGFR: 118 ML/MIN/1.73M2
EOSINOPHIL # BLD AUTO: 0.09 K/UL
EOSINOPHIL NFR BLD AUTO: 1.5 %
ESTIMATED AVERAGE GLUCOSE: 103 MG/DL
FOLATE SERPL-MCNC: 4.8 NG/ML
GLUCOSE SERPL-MCNC: 88 MG/DL
HBA1C MFR BLD HPLC: 5.2 %
HCT VFR BLD CALC: 34.9 %
HDLC SERPL-MCNC: 48 MG/DL
HGB BLD-MCNC: 11 G/DL
IMM GRANULOCYTES NFR BLD AUTO: 0.2 %
IRON SERPL-MCNC: 26 UG/DL
LDLC SERPL CALC-MCNC: 73 MG/DL
LYMPHOCYTES # BLD AUTO: 1.99 K/UL
LYMPHOCYTES NFR BLD AUTO: 32.8 %
MAN DIFF?: NORMAL
MCHC RBC-ENTMCNC: 26.9 PG
MCHC RBC-ENTMCNC: 31.5 GM/DL
MCV RBC AUTO: 85.3 FL
MONOCYTES # BLD AUTO: 0.37 K/UL
MONOCYTES NFR BLD AUTO: 6.1 %
NEUTROPHILS # BLD AUTO: 3.58 K/UL
NEUTROPHILS NFR BLD AUTO: 58.9 %
NONHDLC SERPL-MCNC: 83 MG/DL
PARATHYROID HORMONE INTACT: 82 PG/ML
PLATELET # BLD AUTO: 271 K/UL
POTASSIUM SERPL-SCNC: 3.9 MMOL/L
PREALB SERPL NEPH-MCNC: 16 MG/DL
PROT SERPL-MCNC: 6.8 G/DL
RBC # BLD: 4.09 M/UL
RBC # FLD: 15.6 %
SODIUM SERPL-SCNC: 142 MMOL/L
TRIGL SERPL-MCNC: 50 MG/DL
TSH SERPL-ACNC: 1.88 UIU/ML
VIT B12 SERPL-MCNC: 404 PG/ML
WBC # FLD AUTO: 6.07 K/UL
ZINC SERPL-MCNC: 77 UG/DL

## 2022-04-06 LAB
A-TOCOPHEROL VIT E SERPL-MCNC: 5 MG/L
BETA+GAMMA TOCOPHEROL SERPL-MCNC: 1.2 MG/L
VIT A SERPL-MCNC: 28 UG/DL
VIT B1 SERPL-MCNC: 76.9 NMOL/L

## 2022-05-05 DIAGNOSIS — R05.9 COUGH, UNSPECIFIED: ICD-10-CM

## 2022-05-06 ENCOUNTER — TRANSCRIPTION ENCOUNTER (OUTPATIENT)
Age: 32
End: 2022-05-06

## 2022-05-06 LAB — SARS-COV-2 N GENE NPH QL NAA+PROBE: NOT DETECTED

## 2022-05-11 ENCOUNTER — RX RENEWAL (OUTPATIENT)
Age: 32
End: 2022-05-11

## 2022-05-18 ENCOUNTER — APPOINTMENT (OUTPATIENT)
Dept: BARIATRICS | Facility: CLINIC | Age: 32
End: 2022-05-18

## 2022-05-18 VITALS
SYSTOLIC BLOOD PRESSURE: 119 MMHG | OXYGEN SATURATION: 100 % | DIASTOLIC BLOOD PRESSURE: 82 MMHG | WEIGHT: 209 LBS | BODY MASS INDEX: 41.03 KG/M2 | HEIGHT: 60 IN | TEMPERATURE: 97.6 F | HEART RATE: 69 BPM

## 2022-05-18 PROCEDURE — 99212 OFFICE O/P EST SF 10 MIN: CPT

## 2022-06-30 ENCOUNTER — APPOINTMENT (OUTPATIENT)
Dept: INTERNAL MEDICINE | Facility: CLINIC | Age: 32
End: 2022-06-30

## 2022-06-30 ENCOUNTER — TRANSCRIPTION ENCOUNTER (OUTPATIENT)
Age: 32
End: 2022-06-30

## 2022-06-30 VITALS
RESPIRATION RATE: 12 BRPM | OXYGEN SATURATION: 100 % | DIASTOLIC BLOOD PRESSURE: 78 MMHG | TEMPERATURE: 98.9 F | WEIGHT: 206 LBS | HEART RATE: 67 BPM | BODY MASS INDEX: 40.44 KG/M2 | HEIGHT: 60 IN | SYSTOLIC BLOOD PRESSURE: 116 MMHG

## 2022-06-30 DIAGNOSIS — R55 SYNCOPE AND COLLAPSE: ICD-10-CM

## 2022-06-30 PROCEDURE — 81025 URINE PREGNANCY TEST: CPT

## 2022-06-30 PROCEDURE — 99213 OFFICE O/P EST LOW 20 MIN: CPT | Mod: GC,25

## 2022-06-30 PROCEDURE — 36415 COLL VENOUS BLD VENIPUNCTURE: CPT

## 2022-06-30 RX ORDER — LEVONORGESTREL 1.5 MG/1
1.5 TABLET ORAL
Qty: 1 | Refills: 0 | Status: DISCONTINUED | COMMUNITY
Start: 2021-11-22 | End: 2022-06-30

## 2022-06-30 NOTE — ASSESSMENT
[FreeTextEntry1] : She was told to start a regular strength training regimen. 
Yes, Non-Core measure site...

## 2022-06-30 NOTE — HISTORY OF PRESENT ILLNESS
[de-identified] : Patient is doing well and has no complaints s/p MDS 8 months ago. She is eating a proper diet and taking her vitamins. She is only walking for exercise.

## 2022-06-30 NOTE — PHYSICAL EXAM
[No Varicosities] : no varicosities [No Edema] : there was no peripheral edema [No Extremity Clubbing/Cyanosis] : no extremity clubbing/cyanosis [Normal] : affect was normal and insight and judgment were intact

## 2022-07-01 ENCOUNTER — TRANSCRIPTION ENCOUNTER (OUTPATIENT)
Age: 32
End: 2022-07-01

## 2022-07-04 LAB
ALBUMIN SERPL ELPH-MCNC: 3.9 G/DL
ALP BLD-CCNC: 140 U/L
ALT SERPL-CCNC: 21 U/L
AST SERPL-CCNC: 19 U/L
BASOPHILS # BLD AUTO: 0.04 K/UL
BASOPHILS NFR BLD AUTO: 0.5 %
BILIRUB DIRECT SERPL-MCNC: 0.1 MG/DL
BILIRUB INDIRECT SERPL-MCNC: 0.1 MG/DL
BILIRUB SERPL-MCNC: 0.2 MG/DL
EOSINOPHIL # BLD AUTO: 0.13 K/UL
EOSINOPHIL NFR BLD AUTO: 1.7 %
HCT VFR BLD CALC: 33 %
HGB BLD-MCNC: 10.3 G/DL
IMM GRANULOCYTES NFR BLD AUTO: 0.3 %
LYMPHOCYTES # BLD AUTO: 2.9 K/UL
LYMPHOCYTES NFR BLD AUTO: 37.2 %
MAN DIFF?: NORMAL
MCHC RBC-ENTMCNC: 26.1 PG
MCHC RBC-ENTMCNC: 31.2 GM/DL
MCV RBC AUTO: 83.8 FL
MONOCYTES # BLD AUTO: 0.49 K/UL
MONOCYTES NFR BLD AUTO: 6.3 %
NEUTROPHILS # BLD AUTO: 4.22 K/UL
NEUTROPHILS NFR BLD AUTO: 54 %
PLATELET # BLD AUTO: 242 K/UL
PROT SERPL-MCNC: 6.6 G/DL
RBC # BLD: 3.94 M/UL
RBC # FLD: 16.9 %
WBC # FLD AUTO: 7.8 K/UL

## 2022-07-04 NOTE — END OF VISIT
[] : Resident [FreeTextEntry3] : acute stress with death in family.  will do a short term benzo course. \par Since last discussion about ssri, she has been managing her anxiety without meds until this time.  [Time Spent: ___ minutes] : I have spent [unfilled] minutes of time on the encounter.

## 2022-07-04 NOTE — REVIEW OF SYSTEMS
[Recent Change In Weight] : ~T recent weight change [Dizziness] : dizziness [Fainting] : fainting [Insomnia] : insomnia [Anxiety] : anxiety [Negative] : Heme/Lymph [Fever] : no fever [Chills] : no chills [Fatigue] : no fatigue [Hot Flashes] : no hot flashes [Night Sweats] : no night sweats [Headache] : no headache [Confusion] : no confusion [Memory Loss] : no memory loss [Unsteady Walking] : no ataxia [Suicidal] : not suicidal [Depression] : no depression [de-identified] : pre-syncopal episodes with blurry vision and lightheadedness  [FreeTextEntry1] : menorrhagia
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
## 2022-07-05 ENCOUNTER — TRANSCRIPTION ENCOUNTER (OUTPATIENT)
Age: 32
End: 2022-07-05

## 2022-07-06 ENCOUNTER — NON-APPOINTMENT (OUTPATIENT)
Age: 32
End: 2022-07-06

## 2022-07-06 ENCOUNTER — TRANSCRIPTION ENCOUNTER (OUTPATIENT)
Age: 32
End: 2022-07-06

## 2022-07-06 LAB — GGT SERPL-CCNC: 10 U/L

## 2022-07-07 ENCOUNTER — TRANSCRIPTION ENCOUNTER (OUTPATIENT)
Age: 32
End: 2022-07-07

## 2022-07-07 LAB — SARS-COV-2 N GENE NPH QL NAA+PROBE: NOT DETECTED

## 2022-07-08 ENCOUNTER — TRANSCRIPTION ENCOUNTER (OUTPATIENT)
Age: 32
End: 2022-07-08

## 2022-08-05 ENCOUNTER — TRANSCRIPTION ENCOUNTER (OUTPATIENT)
Age: 32
End: 2022-08-05

## 2022-08-05 ENCOUNTER — APPOINTMENT (OUTPATIENT)
Dept: INTERNAL MEDICINE | Facility: CLINIC | Age: 32
End: 2022-08-05

## 2022-08-05 VITALS
TEMPERATURE: 99 F | WEIGHT: 193 LBS | DIASTOLIC BLOOD PRESSURE: 88 MMHG | SYSTOLIC BLOOD PRESSURE: 129 MMHG | HEART RATE: 68 BPM | HEIGHT: 60 IN | BODY MASS INDEX: 37.89 KG/M2 | OXYGEN SATURATION: 100 %

## 2022-08-05 DIAGNOSIS — N93.9 ABNORMAL UTERINE AND VAGINAL BLEEDING, UNSPECIFIED: ICD-10-CM

## 2022-08-05 DIAGNOSIS — R42 DIZZINESS AND GIDDINESS: ICD-10-CM

## 2022-08-05 PROCEDURE — 36415 COLL VENOUS BLD VENIPUNCTURE: CPT

## 2022-08-05 PROCEDURE — 99214 OFFICE O/P EST MOD 30 MIN: CPT | Mod: 25,GC

## 2022-08-08 LAB
ALBUMIN SERPL ELPH-MCNC: 4.4 G/DL
ALP BLD-CCNC: 163 U/L
ALT SERPL-CCNC: 21 U/L
ANION GAP SERPL CALC-SCNC: 8 MMOL/L
AST SERPL-CCNC: 19 U/L
BASOPHILS # BLD AUTO: 0.03 K/UL
BASOPHILS NFR BLD AUTO: 0.5 %
BILIRUB SERPL-MCNC: 0.2 MG/DL
BUN SERPL-MCNC: 10 MG/DL
CALCIUM SERPL-MCNC: 8.8 MG/DL
CHLORIDE SERPL-SCNC: 105 MMOL/L
CO2 SERPL-SCNC: 25 MMOL/L
CREAT SERPL-MCNC: 0.7 MG/DL
EGFR: 120 ML/MIN/1.73M2
EOSINOPHIL # BLD AUTO: 0.08 K/UL
EOSINOPHIL NFR BLD AUTO: 1.3 %
FERRITIN SERPL-MCNC: 7 NG/ML
GLUCOSE SERPL-MCNC: 86 MG/DL
HCT VFR BLD CALC: 34.4 %
HGB BLD-MCNC: 10.7 G/DL
IMM GRANULOCYTES NFR BLD AUTO: 0.3 %
IRON SATN MFR SERPL: 6 %
IRON SERPL-MCNC: 24 UG/DL
LYMPHOCYTES # BLD AUTO: 2.59 K/UL
LYMPHOCYTES NFR BLD AUTO: 40.9 %
MAN DIFF?: NORMAL
MCHC RBC-ENTMCNC: 26.2 PG
MCHC RBC-ENTMCNC: 31.1 GM/DL
MCV RBC AUTO: 84.3 FL
MONOCYTES # BLD AUTO: 0.39 K/UL
MONOCYTES NFR BLD AUTO: 6.2 %
NEUTROPHILS # BLD AUTO: 3.22 K/UL
NEUTROPHILS NFR BLD AUTO: 50.8 %
PLATELET # BLD AUTO: 254 K/UL
POTASSIUM SERPL-SCNC: 3.9 MMOL/L
PROT SERPL-MCNC: 6.9 G/DL
RBC # BLD: 4.08 M/UL
RBC # FLD: 17.5 %
SODIUM SERPL-SCNC: 138 MMOL/L
TIBC SERPL-MCNC: 389 UG/DL
TSH SERPL-ACNC: 1.8 UIU/ML
UIBC SERPL-MCNC: 365 UG/DL
WBC # FLD AUTO: 6.33 K/UL

## 2022-08-09 ENCOUNTER — APPOINTMENT (OUTPATIENT)
Dept: OBGYN | Facility: CLINIC | Age: 32
End: 2022-08-09

## 2022-08-09 VITALS
SYSTOLIC BLOOD PRESSURE: 112 MMHG | HEIGHT: 60 IN | BODY MASS INDEX: 37.3 KG/M2 | WEIGHT: 190 LBS | HEART RATE: 94 BPM | DIASTOLIC BLOOD PRESSURE: 74 MMHG

## 2022-08-09 DIAGNOSIS — Z30.017 ENCOUNTER FOR INITIAL PRESCRIPTION OF IMPLANTABLE SUBDERMAL CONTRACEPTIVE: ICD-10-CM

## 2022-08-09 DIAGNOSIS — Z30.40 ENCOUNTER FOR SURVEILLANCE OF CONTRACEPTIVES, UNSPECIFIED: ICD-10-CM

## 2022-08-09 DIAGNOSIS — Z78.9 OTHER SPECIFIED HEALTH STATUS: ICD-10-CM

## 2022-08-09 PROCEDURE — 11982 REMOVE DRUG IMPLANT DEVICE: CPT

## 2022-08-09 NOTE — DISCUSSION/SUMMARY
[FreeTextEntry1] : 30 yo here for removal of Nexplanon will switch to NuvaRing\par -f/u wwv in Nov\par -f/u PRN

## 2022-08-09 NOTE — HISTORY OF PRESENT ILLNESS
[FreeTextEntry1] : 30 yo present for Nexplanon removal she has been having irregular bleeding for weeks at a time wants it removed. Will go back on NuvaRing  [TextBox_4] : pt has no complains , just wants to remove her Nexplanon

## 2022-08-16 ENCOUNTER — TRANSCRIPTION ENCOUNTER (OUTPATIENT)
Age: 32
End: 2022-08-16

## 2022-08-16 ENCOUNTER — APPOINTMENT (OUTPATIENT)
Dept: INTERNAL MEDICINE | Facility: CLINIC | Age: 32
End: 2022-08-16

## 2022-08-16 DIAGNOSIS — Z72.51 HIGH RISK HETEROSEXUAL BEHAVIOR: ICD-10-CM

## 2022-08-16 PROCEDURE — 99213 OFFICE O/P EST LOW 20 MIN: CPT | Mod: 95

## 2022-08-18 ENCOUNTER — APPOINTMENT (OUTPATIENT)
Dept: INTERNAL MEDICINE | Facility: CLINIC | Age: 32
End: 2022-08-18

## 2022-08-30 ENCOUNTER — TRANSCRIPTION ENCOUNTER (OUTPATIENT)
Age: 32
End: 2022-08-30

## 2022-09-06 ENCOUNTER — RESULT REVIEW (OUTPATIENT)
Age: 32
End: 2022-09-06

## 2022-09-09 ENCOUNTER — RX RENEWAL (OUTPATIENT)
Age: 32
End: 2022-09-09

## 2022-09-09 DIAGNOSIS — Z30.9 ENCOUNTER FOR CONTRACEPTIVE MANAGEMENT, UNSPECIFIED: ICD-10-CM

## 2022-09-15 ENCOUNTER — TRANSCRIPTION ENCOUNTER (OUTPATIENT)
Age: 32
End: 2022-09-15

## 2022-09-19 NOTE — DISCHARGE NOTE PROVIDER - NPI NUMBER (FOR SYSADMIN USE ONLY) :
3:31 PM Recheck on patient. Discussed with patient ED findings and plan for discharge. Patient was given ED warnings, discharge instructions, and follow up information to go home with. Patient understands and agrees with plan for discharge. Any questions have been answered.     [[id number]]

## 2022-09-20 ENCOUNTER — TRANSCRIPTION ENCOUNTER (OUTPATIENT)
Age: 32
End: 2022-09-20

## 2022-09-20 DIAGNOSIS — Z32.00 ENCOUNTER FOR PREGNANCY TEST, RESULT UNKNOWN: ICD-10-CM

## 2022-09-22 ENCOUNTER — TRANSCRIPTION ENCOUNTER (OUTPATIENT)
Age: 32
End: 2022-09-22

## 2022-10-06 ENCOUNTER — APPOINTMENT (OUTPATIENT)
Dept: INTERNAL MEDICINE | Facility: CLINIC | Age: 32
End: 2022-10-06

## 2022-10-06 VITALS
OXYGEN SATURATION: 100 % | RESPIRATION RATE: 12 BRPM | TEMPERATURE: 98.6 F | HEART RATE: 63 BPM | SYSTOLIC BLOOD PRESSURE: 124 MMHG | DIASTOLIC BLOOD PRESSURE: 84 MMHG

## 2022-10-06 DIAGNOSIS — M54.2 CERVICALGIA: ICD-10-CM

## 2022-10-06 PROCEDURE — 99214 OFFICE O/P EST MOD 30 MIN: CPT

## 2022-10-07 ENCOUNTER — APPOINTMENT (OUTPATIENT)
Dept: INTERNAL MEDICINE | Facility: CLINIC | Age: 32
End: 2022-10-07

## 2022-10-07 PROCEDURE — 36415 COLL VENOUS BLD VENIPUNCTURE: CPT

## 2022-10-10 LAB
24R-OH-CALCIDIOL SERPL-MCNC: 103 PG/ML
BASOPHILS # BLD AUTO: 0.02 K/UL
BASOPHILS NFR BLD AUTO: 0.4 %
EOSINOPHIL # BLD AUTO: 0.05 K/UL
EOSINOPHIL NFR BLD AUTO: 1 %
HCT VFR BLD CALC: 32.4 %
HGB BLD-MCNC: 10 G/DL
IMM GRANULOCYTES NFR BLD AUTO: 0.2 %
LYMPHOCYTES # BLD AUTO: 1.79 K/UL
LYMPHOCYTES NFR BLD AUTO: 36.3 %
MAN DIFF?: NORMAL
MCHC RBC-ENTMCNC: 25.8 PG
MCHC RBC-ENTMCNC: 30.9 GM/DL
MCV RBC AUTO: 83.7 FL
MONOCYTES # BLD AUTO: 0.34 K/UL
MONOCYTES NFR BLD AUTO: 6.9 %
NEUTROPHILS # BLD AUTO: 2.72 K/UL
NEUTROPHILS NFR BLD AUTO: 55.2 %
PLATELET # BLD AUTO: 224 K/UL
RBC # BLD: 3.87 M/UL
RBC # FLD: 16 %
WBC # FLD AUTO: 4.93 K/UL

## 2022-10-11 LAB
25(OH)D3 SERPL-MCNC: 23.9 NG/ML
ALBUMIN SERPL ELPH-MCNC: 4.4 G/DL
ALP BLD-CCNC: 153 U/L
ALT SERPL-CCNC: 18 U/L
ANION GAP SERPL CALC-SCNC: 13 MMOL/L
AST SERPL-CCNC: 20 U/L
BILIRUB SERPL-MCNC: 0.4 MG/DL
BUN SERPL-MCNC: 11 MG/DL
CALCIUM SERPL-MCNC: 9.2 MG/DL
CHLORIDE SERPL-SCNC: 105 MMOL/L
CO2 SERPL-SCNC: 22 MMOL/L
CREAT SERPL-MCNC: 0.71 MG/DL
EGFR: 117 ML/MIN/1.73M2
GLUCOSE SERPL-MCNC: 72 MG/DL
POTASSIUM SERPL-SCNC: 3.9 MMOL/L
PROT SERPL-MCNC: 7.1 G/DL
SODIUM SERPL-SCNC: 140 MMOL/L

## 2022-10-17 ENCOUNTER — TRANSCRIPTION ENCOUNTER (OUTPATIENT)
Age: 32
End: 2022-10-17

## 2022-10-20 ENCOUNTER — RESULT CHARGE (OUTPATIENT)
Age: 32
End: 2022-10-20

## 2022-10-20 ENCOUNTER — NON-APPOINTMENT (OUTPATIENT)
Age: 32
End: 2022-10-20

## 2022-10-20 ENCOUNTER — APPOINTMENT (OUTPATIENT)
Dept: INTERNAL MEDICINE | Facility: CLINIC | Age: 32
End: 2022-10-20

## 2022-10-20 ENCOUNTER — TRANSCRIPTION ENCOUNTER (OUTPATIENT)
Age: 32
End: 2022-10-20

## 2022-10-20 VITALS
WEIGHT: 180 LBS | HEIGHT: 60 IN | BODY MASS INDEX: 35.34 KG/M2 | OXYGEN SATURATION: 100 % | SYSTOLIC BLOOD PRESSURE: 134 MMHG | TEMPERATURE: 98.7 F | HEART RATE: 77 BPM | DIASTOLIC BLOOD PRESSURE: 75 MMHG | RESPIRATION RATE: 12 BRPM

## 2022-10-20 DIAGNOSIS — R07.9 CHEST PAIN, UNSPECIFIED: ICD-10-CM

## 2022-10-20 PROCEDURE — 99213 OFFICE O/P EST LOW 20 MIN: CPT

## 2022-10-24 ENCOUNTER — TRANSCRIPTION ENCOUNTER (OUTPATIENT)
Age: 32
End: 2022-10-24

## 2022-10-24 RX ORDER — LORAZEPAM 0.5 MG/1
0.5 TABLET ORAL
Qty: 5 | Refills: 0 | Status: ACTIVE | COMMUNITY
Start: 1900-01-01 | End: 1900-01-01

## 2022-10-25 PROBLEM — R07.9 CHEST PAIN: Status: ACTIVE | Noted: 2022-10-20

## 2022-10-25 NOTE — ASSESSMENT
[FreeTextEntry1] : Ms Sosa is a 32F with PMH of DEVANTE 2/2 heavy menses who presents today with complaints of intermittent left sided chest pain x1 week.\par \par #intermittent left sided chest pain\par -patient with intermittent non-radiating sharp left sided chest pain for 5 days; not associated with exertion, not associated with eating; worse with positional changes. experienced palpitations and  during episode yesterday. episodes last between 5-30 minutes and resolve spontaneously\par -further history as above\par -pain reproducible to palpation of left chest on exam\par -differential ddx includes MSK pain/costochondritis, stress/anxiety (pt previously prescribed ativan and lexapro however currently not taking), PE, arrhythmia, ACS, anemia\par -Wells score for PE 0\par -EKG in office today NSR; no ischemic changes \par -vitals wnl; pt non-tachycardic \par -pain likely muscular in nature vs stress/anxiety induced \par -recent blood work with stable hgb and electrolytes wnl \par -reassurance given to patient \par -f/u in 2 weeks to assess for symptom resolution \par

## 2022-10-25 NOTE — PHYSICAL EXAM
[No Varicosities] : no varicosities [No Edema] : there was no peripheral edema [No Extremity Clubbing/Cyanosis] : no extremity clubbing/cyanosis [Normal] : normal gait, coordination grossly intact, no focal deficits and deep tendon reflexes were 2+ and symmetric [de-identified] : +TTP left chest ; pain reproducible upon palpation

## 2022-10-25 NOTE — REVIEW OF SYSTEMS
[Chest Pain] : chest pain [Palpitations] : palpitations [Negative] : Psychiatric [Leg Claudication] : no leg claudication [Lower Ext Edema] : no lower extremity edema [Orthopnea] : no orthopnea [Paroxysmal Nocturnal Dyspnea] : no paroxysmal nocturnal dyspnea

## 2022-10-25 NOTE — HISTORY OF PRESENT ILLNESS
[FreeTextEntry8] : Ms Sosa is a 32F with PMH of DEVANTE 2/2 heavy menses who presents today with complaints of left sided chest pain x1 week. Patient reports on saturday (5 days ago) she was getting dressed when she felt a sharp left-sided non-radiating pain in her chest which went away after 15 minutes. She experienced the same pain 2 days ago lasting 5 mins and while sitting and again yesterday lasting about a half hour. The episode yesterday was associated with palpitations and some mild blurry vision with facial numbness and a cold sensation in her chest. At that time her HR on her apple watch read 125. She lied down in bed and rested and then her HR came back down to 70s and her symptoms resolved. She again today is experiencing the chest pain slightly with some left arm weakness however currently no palpitations. This has never happened to her before. She denies any associated fevers/chills, headache, LOC, cough, sore throat, SOB, nausea/vomiting, diarrhea, extremity numbness or tingling. She denies LE pain or swelling. Pain not associated with eating but is worse with positional changes. She took plan B 2 days ago. Currently off birth control. Sexually active with 1 partner no protection. 1 cup of coffee a day, denies any OTC supplements. Usually walks 2x/week for exercise however did not this week due to the pain. Never smoker, rarely alcohol, used marijuana once on saturday after the pain came on. Father with history of DVTs, no PEs.

## 2022-11-07 ENCOUNTER — RX RENEWAL (OUTPATIENT)
Age: 32
End: 2022-11-07

## 2022-11-09 NOTE — PROGRESS NOTE ADULT - PROVIDER SPECIALTY LIST ADULT
Pt is aware a urine specimen is needed, call light in reach, will cont to monitor.    
Surgery
Surgery
Gastroenterology
Gastroenterology
Bariatric Surgery

## 2023-01-04 ENCOUNTER — RX RENEWAL (OUTPATIENT)
Age: 33
End: 2023-01-04

## 2023-01-30 ENCOUNTER — RESULT REVIEW (OUTPATIENT)
Age: 33
End: 2023-01-30

## 2023-01-30 ENCOUNTER — EMERGENCY (EMERGENCY)
Facility: HOSPITAL | Age: 33
LOS: 1 days | Discharge: HOME | End: 2023-01-30
Attending: STUDENT IN AN ORGANIZED HEALTH CARE EDUCATION/TRAINING PROGRAM | Admitting: STUDENT IN AN ORGANIZED HEALTH CARE EDUCATION/TRAINING PROGRAM
Payer: MEDICAID

## 2023-01-30 ENCOUNTER — LABORATORY RESULT (OUTPATIENT)
Age: 33
End: 2023-01-30

## 2023-01-30 ENCOUNTER — APPOINTMENT (OUTPATIENT)
Dept: INTERNAL MEDICINE | Facility: CLINIC | Age: 33
End: 2023-01-30
Payer: MEDICAID

## 2023-01-30 VITALS
SYSTOLIC BLOOD PRESSURE: 123 MMHG | RESPIRATION RATE: 17 BRPM | TEMPERATURE: 98 F | HEART RATE: 66 BPM | WEIGHT: 160.06 LBS | OXYGEN SATURATION: 99 % | DIASTOLIC BLOOD PRESSURE: 81 MMHG | HEIGHT: 60 IN

## 2023-01-30 VITALS
HEART RATE: 70 BPM | SYSTOLIC BLOOD PRESSURE: 128 MMHG | TEMPERATURE: 98.3 F | DIASTOLIC BLOOD PRESSURE: 84 MMHG | RESPIRATION RATE: 14 BRPM | OXYGEN SATURATION: 98 % | HEIGHT: 60 IN

## 2023-01-30 DIAGNOSIS — N92.6 IRREGULAR MENSTRUATION, UNSPECIFIED: ICD-10-CM

## 2023-01-30 DIAGNOSIS — Z98.84 BARIATRIC SURGERY STATUS: Chronic | ICD-10-CM

## 2023-01-30 DIAGNOSIS — M54.2 CERVICALGIA: ICD-10-CM

## 2023-01-30 DIAGNOSIS — Z98.891 HISTORY OF UTERINE SCAR FROM PREVIOUS SURGERY: Chronic | ICD-10-CM

## 2023-01-30 PROCEDURE — 99285 EMERGENCY DEPT VISIT HI MDM: CPT

## 2023-01-30 PROCEDURE — 99214 OFFICE O/P EST MOD 30 MIN: CPT | Mod: 25,GC

## 2023-01-30 RX ORDER — ETONOGESTREL AND ETHINYL ESTRADIOL 11.7; 2.7 MG/1; MG/1
0.12-0.015 INSERT, EXTENDED RELEASE VAGINAL
Qty: 1 | Refills: 0 | Status: DISCONTINUED | COMMUNITY
Start: 2022-08-09 | End: 2023-01-30

## 2023-01-30 RX ORDER — ETONOGESTREL AND ETHINYL ESTRADIOL 11.7; 2.7 MG/1; MG/1
0.12-0.015 INSERT, EXTENDED RELEASE VAGINAL
Qty: 3 | Refills: 1 | Status: DISCONTINUED | COMMUNITY
Start: 2022-08-09 | End: 2023-01-30

## 2023-01-30 RX ORDER — ETONOGESTREL AND ETHINYL ESTRADIOL .12; .015 MG/D; MG/D
0.12-0.015 RING VAGINAL
Qty: 3 | Refills: 3 | Status: DISCONTINUED | COMMUNITY
Start: 2022-09-09 | End: 2023-01-30

## 2023-01-30 NOTE — ED ADULT NURSE NOTE - BOWEL SOUNDS LLQ
[de-identified] : 59 yr old M with a history of DM type 2, BPPV, h/o covid 19, htn, HLD, presetns for follow up of chronic conditions \par \par pt comparing it with blood and noting that sensor is way off from finger stick glucose \par \par Covid 19\par - cough came and went and now is completely gone \par - taking night time cough medication \par \par Got vertigo the other day lightly \par - when leaving car got out too quickly\par - started moving head and felt better after meclizine\par \par Cardiologist and GI needed \par \par Phimosis - will get circumcision eventually \par \par Tested glucose before entering and it was 150 after breakfast\par  present

## 2023-01-30 NOTE — ED ADULT NURSE NOTE - OBJECTIVE STATEMENT
CC of RUQ pain, sharp in nature x 2 days. + nausea but denies vomiting and diarrhea. Hx of cholecystectomy and duodenal switch.    triage note as above, pt. states radiates to rt posterior shoulder

## 2023-01-30 NOTE — ED ADULT NURSE NOTE - ADDITIONAL COMPLAINTS
[facility]  HISTORY AND PHYSICAL       Maday Bae Patient Status:  Observation      47year old [facility] [id number]   Location 522/522-A Attending Damien Leiva MD     PCP Carlos No MD     ASSESSMENT/PLAN    Ureterolithiasis.   4 Oral Tab  Take 1-2 tablets by mouth every 4 (four) hours as needed for Pain.   Qty: 10 tablet Refills: 0          SOCIAL HISTORY  Social History    Marital status:              Spouse name:                       Years of education:                 Nu arm)   Pulse 83   Temp 97.6 °F (36.4 °C) (Oral)   Resp 20   Ht 6' (1.829 m)   Wt 205 lb 3.2 oz (93.1 kg)   SpO2 98%   BMI 27.83 kg/m²   Gen: A+Ox3. Moderately uncomfortable  HEENT: NCAT, neck supple, no carotid bruit.   CV: RRR, S1S2, and intact distal pul Additional Complaints

## 2023-01-30 NOTE — ED ADULT TRIAGE NOTE - OTHER COMPLAINTS
CC of RUQ pain, sharp in nature x 2 days. + nausea but denies vomiting and diarrhea. Hx of cholecystectomy and duodenal switch.

## 2023-01-30 NOTE — ED ADULT NURSE NOTE - CAS ELECT INFOMATION PROVIDED
Pt verbalized understanding of d/c instructions and ambulated out of ED independently with a smooth and steady gait./DC instructions

## 2023-01-30 NOTE — ED ADULT TRIAGE NOTE - PAIN RATING/NUMBER SCALE (0-10): ACTIVITY
Reason for Disposition  • Large swelling or bruise > 2 inches (5 cm)  • [1] Age over 64 years AND [2] swelling or bruise  • Scalp swelling, bruise or pain    Protocols used: HEAD INJURY-A-AH     7 (severe pain)

## 2023-01-31 ENCOUNTER — NON-APPOINTMENT (OUTPATIENT)
Age: 33
End: 2023-01-31

## 2023-01-31 VITALS
DIASTOLIC BLOOD PRESSURE: 73 MMHG | OXYGEN SATURATION: 99 % | RESPIRATION RATE: 16 BRPM | HEART RATE: 84 BPM | TEMPERATURE: 98 F | SYSTOLIC BLOOD PRESSURE: 116 MMHG

## 2023-01-31 PROBLEM — N92.6 MISSED MENSES: Status: ACTIVE | Noted: 2023-01-30

## 2023-01-31 PROBLEM — M54.2 NECK PAIN: Status: ACTIVE | Noted: 2022-10-06

## 2023-01-31 LAB
ALBUMIN SERPL ELPH-MCNC: 4.2 G/DL — SIGNIFICANT CHANGE UP (ref 3.3–5)
ALP SERPL-CCNC: 128 U/L — HIGH (ref 40–120)
ALT FLD-CCNC: 26 U/L — SIGNIFICANT CHANGE UP (ref 10–45)
ANION GAP SERPL CALC-SCNC: 7 MMOL/L — SIGNIFICANT CHANGE UP (ref 5–17)
APPEARANCE UR: CLEAR — SIGNIFICANT CHANGE UP
AST SERPL-CCNC: 26 U/L — SIGNIFICANT CHANGE UP (ref 10–40)
BACTERIA # UR AUTO: PRESENT /HPF
BASOPHILS # BLD AUTO: 0.03 K/UL — SIGNIFICANT CHANGE UP (ref 0–0.2)
BASOPHILS NFR BLD AUTO: 0.5 % — SIGNIFICANT CHANGE UP (ref 0–2)
BILIRUB SERPL-MCNC: 0.2 MG/DL — SIGNIFICANT CHANGE UP (ref 0.2–1.2)
BILIRUB UR-MCNC: NEGATIVE — SIGNIFICANT CHANGE UP
BUN SERPL-MCNC: 16 MG/DL — SIGNIFICANT CHANGE UP (ref 7–23)
CALCIUM SERPL-MCNC: 8.8 MG/DL — SIGNIFICANT CHANGE UP (ref 8.4–10.5)
CHLORIDE SERPL-SCNC: 104 MMOL/L — SIGNIFICANT CHANGE UP (ref 96–108)
CO2 SERPL-SCNC: 27 MMOL/L — SIGNIFICANT CHANGE UP (ref 22–31)
COLOR SPEC: YELLOW — SIGNIFICANT CHANGE UP
CREAT SERPL-MCNC: 0.86 MG/DL — SIGNIFICANT CHANGE UP (ref 0.5–1.3)
DIFF PNL FLD: ABNORMAL
EGFR: 93 ML/MIN/1.73M2 — SIGNIFICANT CHANGE UP
EOSINOPHIL # BLD AUTO: 0.11 K/UL — SIGNIFICANT CHANGE UP (ref 0–0.5)
EOSINOPHIL NFR BLD AUTO: 2 % — SIGNIFICANT CHANGE UP (ref 0–6)
EPI CELLS # UR: ABNORMAL /HPF (ref 0–5)
GLUCOSE SERPL-MCNC: 89 MG/DL — SIGNIFICANT CHANGE UP (ref 70–99)
GLUCOSE UR QL: NEGATIVE — SIGNIFICANT CHANGE UP
HCG SERPL-ACNC: <0 MIU/ML — SIGNIFICANT CHANGE UP
HCT VFR BLD CALC: 31.4 % — LOW (ref 34.5–45)
HGB BLD-MCNC: 9.8 G/DL — LOW (ref 11.5–15.5)
HYALINE CASTS # UR AUTO: SIGNIFICANT CHANGE UP /LPF (ref 0–2)
IMM GRANULOCYTES NFR BLD AUTO: 0.2 % — SIGNIFICANT CHANGE UP (ref 0–0.9)
KETONES UR-MCNC: NEGATIVE — SIGNIFICANT CHANGE UP
LACTATE SERPL-SCNC: 0.4 MMOL/L — LOW (ref 0.5–2)
LEUKOCYTE ESTERASE UR-ACNC: NEGATIVE — SIGNIFICANT CHANGE UP
LIDOCAIN IGE QN: 14 U/L — SIGNIFICANT CHANGE UP (ref 7–60)
LYMPHOCYTES # BLD AUTO: 3 K/UL — SIGNIFICANT CHANGE UP (ref 1–3.3)
LYMPHOCYTES # BLD AUTO: 54.2 % — HIGH (ref 13–44)
MAGNESIUM SERPL-MCNC: 1.9 MG/DL — SIGNIFICANT CHANGE UP (ref 1.6–2.6)
MCHC RBC-ENTMCNC: 25.9 PG — LOW (ref 27–34)
MCHC RBC-ENTMCNC: 31.2 GM/DL — LOW (ref 32–36)
MCV RBC AUTO: 82.8 FL — SIGNIFICANT CHANGE UP (ref 80–100)
MONOCYTES # BLD AUTO: 0.32 K/UL — SIGNIFICANT CHANGE UP (ref 0–0.9)
MONOCYTES NFR BLD AUTO: 5.8 % — SIGNIFICANT CHANGE UP (ref 2–14)
NEUTROPHILS # BLD AUTO: 2.07 K/UL — SIGNIFICANT CHANGE UP (ref 1.8–7.4)
NEUTROPHILS NFR BLD AUTO: 37.3 % — LOW (ref 43–77)
NITRITE UR-MCNC: NEGATIVE — SIGNIFICANT CHANGE UP
NRBC # BLD: 0 /100 WBCS — SIGNIFICANT CHANGE UP (ref 0–0)
PH UR: 6.5 — SIGNIFICANT CHANGE UP (ref 5–8)
PLATELET # BLD AUTO: 257 K/UL — SIGNIFICANT CHANGE UP (ref 150–400)
POTASSIUM SERPL-MCNC: 3.4 MMOL/L — LOW (ref 3.5–5.3)
POTASSIUM SERPL-SCNC: 3.4 MMOL/L — LOW (ref 3.5–5.3)
PROT SERPL-MCNC: 6.8 G/DL — SIGNIFICANT CHANGE UP (ref 6–8.3)
PROT UR-MCNC: ABNORMAL MG/DL
RBC # BLD: 3.79 M/UL — LOW (ref 3.8–5.2)
RBC # FLD: 16.2 % — HIGH (ref 10.3–14.5)
RBC CASTS # UR COMP ASSIST: < 5 /HPF — SIGNIFICANT CHANGE UP
SARS-COV-2 RNA SPEC QL NAA+PROBE: NEGATIVE — SIGNIFICANT CHANGE UP
SODIUM SERPL-SCNC: 138 MMOL/L — SIGNIFICANT CHANGE UP (ref 135–145)
SP GR SPEC: 1.02 — SIGNIFICANT CHANGE UP (ref 1–1.03)
UROBILINOGEN FLD QL: 1 E.U./DL — SIGNIFICANT CHANGE UP
WBC # BLD: 5.54 K/UL — SIGNIFICANT CHANGE UP (ref 3.8–10.5)
WBC # FLD AUTO: 5.54 K/UL — SIGNIFICANT CHANGE UP (ref 3.8–10.5)
WBC UR QL: < 5 /HPF — SIGNIFICANT CHANGE UP

## 2023-01-31 PROCEDURE — 36415 COLL VENOUS BLD VENIPUNCTURE: CPT

## 2023-01-31 PROCEDURE — 84702 CHORIONIC GONADOTROPIN TEST: CPT

## 2023-01-31 PROCEDURE — 74177 CT ABD & PELVIS W/CONTRAST: CPT | Mod: 26,MA

## 2023-01-31 PROCEDURE — 96365 THER/PROPH/DIAG IV INF INIT: CPT

## 2023-01-31 PROCEDURE — 76705 ECHO EXAM OF ABDOMEN: CPT

## 2023-01-31 PROCEDURE — 83735 ASSAY OF MAGNESIUM: CPT

## 2023-01-31 PROCEDURE — 93005 ELECTROCARDIOGRAM TRACING: CPT

## 2023-01-31 PROCEDURE — 74177 CT ABD & PELVIS W/CONTRAST: CPT | Mod: MA

## 2023-01-31 PROCEDURE — 99285 EMERGENCY DEPT VISIT HI MDM: CPT | Mod: 25

## 2023-01-31 PROCEDURE — 96375 TX/PRO/DX INJ NEW DRUG ADDON: CPT

## 2023-01-31 PROCEDURE — 85025 COMPLETE CBC W/AUTO DIFF WBC: CPT

## 2023-01-31 PROCEDURE — 83690 ASSAY OF LIPASE: CPT

## 2023-01-31 PROCEDURE — 80053 COMPREHEN METABOLIC PANEL: CPT

## 2023-01-31 PROCEDURE — 81001 URINALYSIS AUTO W/SCOPE: CPT

## 2023-01-31 PROCEDURE — 83605 ASSAY OF LACTIC ACID: CPT

## 2023-01-31 PROCEDURE — 87635 SARS-COV-2 COVID-19 AMP PRB: CPT

## 2023-01-31 PROCEDURE — 76705 ECHO EXAM OF ABDOMEN: CPT | Mod: 26

## 2023-01-31 RX ORDER — FAMOTIDINE 10 MG/ML
20 INJECTION INTRAVENOUS ONCE
Refills: 0 | Status: COMPLETED | OUTPATIENT
Start: 2023-01-31 | End: 2023-01-31

## 2023-01-31 RX ORDER — IOHEXOL 300 MG/ML
30 INJECTION, SOLUTION INTRAVENOUS ONCE
Refills: 0 | Status: COMPLETED | OUTPATIENT
Start: 2023-01-31 | End: 2023-01-31

## 2023-01-31 RX ORDER — ONDANSETRON 8 MG/1
1 TABLET, FILM COATED ORAL
Qty: 24 | Refills: 0
Start: 2023-01-31

## 2023-01-31 RX ORDER — MORPHINE SULFATE 50 MG/1
4 CAPSULE, EXTENDED RELEASE ORAL ONCE
Refills: 0 | Status: DISCONTINUED | OUTPATIENT
Start: 2023-01-31 | End: 2023-01-31

## 2023-01-31 RX ORDER — ACETAMINOPHEN 500 MG
1000 TABLET ORAL ONCE
Refills: 0 | Status: COMPLETED | OUTPATIENT
Start: 2023-01-31 | End: 2023-01-31

## 2023-01-31 RX ORDER — OXYCODONE HYDROCHLORIDE 5 MG/1
1 TABLET ORAL
Qty: 15 | Refills: 0
Start: 2023-01-31

## 2023-01-31 RX ADMIN — Medication 30 MILLILITER(S): at 01:47

## 2023-01-31 RX ADMIN — Medication 400 MILLIGRAM(S): at 01:47

## 2023-01-31 RX ADMIN — Medication 1000 MILLIGRAM(S): at 02:07

## 2023-01-31 RX ADMIN — FAMOTIDINE 20 MILLIGRAM(S): 10 INJECTION INTRAVENOUS at 01:47

## 2023-01-31 RX ADMIN — MORPHINE SULFATE 4 MILLIGRAM(S): 50 CAPSULE, EXTENDED RELEASE ORAL at 02:30

## 2023-01-31 RX ADMIN — IOHEXOL 30 MILLILITER(S): 300 INJECTION, SOLUTION INTRAVENOUS at 02:40

## 2023-01-31 RX ADMIN — Medication 1000 MILLIGRAM(S): at 02:20

## 2023-01-31 RX ADMIN — MORPHINE SULFATE 4 MILLIGRAM(S): 50 CAPSULE, EXTENDED RELEASE ORAL at 03:00

## 2023-01-31 NOTE — ED PROVIDER NOTE - PATIENT PORTAL LINK FT
You can access the FollowMyHealth Patient Portal offered by [facility] by registering at the following website: [email or web address]. By joining [employer]’s FollowMyHealth portal, you will also be able to view your health information using other applications (apps) compatible with our system.

## 2023-01-31 NOTE — ED PROVIDER NOTE - PHYSICAL EXAMINATION
General: Awake, alert and oriented. Well developed, hydrated and nourished. Appears stated age.   Skin: Skin in warm, dry and intact without rashes or lesions. Appropriate color for ethnicity  HENMT: head normocephalic and atraumatic; bilateral external ears without swelling. no nasal discharge. moist oral mucosa. supple neck, trachea midline  EYES: Conjunctiva clear. nonicteric sclera. EOM intact, Eyelids are normal in appearance without swelling or lesions.  Cardiac: well perfused  Respiratory: breathing comfortably on room air. no audible wheezing or stridor  Abdominal: nondistended. soft, moderate ttp in right side of abdomen. no guarding. otherwise nontender. no cva ttp.   MSK: Neck and back are without deformity, visible external skin changes, or signs of trauma. Curvature of the cervical, thoracic, and lumbar spine are within normal limits. no external signs of trauma. no apparent deficits in ROM of any extremity  Neurological: The patient is awake, alert and oriented to person, place, and time with normal speech. CN 2-12 grossly intact. no apparent deficits. Memory is normal and thought process is intact. No gait abnormalities are appreciated.   Psychiatric: Appropriate mood and affect. Good judgement and insight

## 2023-01-31 NOTE — ED PROVIDER NOTE - TOBACCO USE
What Type Of Note Output Would You Prefer (Optional)?: Bullet Format
How Severe Is Your Skin Lesion?: mild
Has Your Skin Lesion Been Treated?: not been treated
Is This A New Presentation, Or A Follow-Up?: Skin Lesion
No
Unknown if ever smoked

## 2023-01-31 NOTE — ED PROVIDER NOTE - OBJECTIVE STATEMENT
32F surgical history cholecystectomy, gastric bypass w/duodenal switch. 1 week of heart burn. which she intermittently gets. has started omeprazole 1 week ago after experinecing that. last 2 days with constnat RUQ pain. assoc with nausea but no vomiting. last BM 3 days ago. passing gas rectally. normal urination. adequate po intake.

## 2023-01-31 NOTE — ED PROVIDER NOTE - CLINICAL SUMMARY MEDICAL DECISION MAKING FREE TEXT BOX
ct obtained to eval for possible obstruction, infection. ct with periportal edema, lfts unremarkable. RUQ US negative for acute pathology. has gi follow up available. will dc with symptom control and return precautions

## 2023-01-31 NOTE — ED ADULT NURSE REASSESSMENT NOTE - NS ED NURSE REASSESS COMMENT FT1
interventions implemented, with p.o. contrast initiated, understanding verbalized, assessment on-going, pending planned CT for 0340

## 2023-02-03 DIAGNOSIS — Z98.84 BARIATRIC SURGERY STATUS: ICD-10-CM

## 2023-02-03 DIAGNOSIS — R73.03 PREDIABETES: ICD-10-CM

## 2023-02-03 DIAGNOSIS — G52.7 DISORDERS OF MULTIPLE CRANIAL NERVES: ICD-10-CM

## 2023-02-03 DIAGNOSIS — R10.11 RIGHT UPPER QUADRANT PAIN: ICD-10-CM

## 2023-02-03 DIAGNOSIS — Z20.822 CONTACT WITH AND (SUSPECTED) EXPOSURE TO COVID-19: ICD-10-CM

## 2023-02-03 DIAGNOSIS — Z90.49 ACQUIRED ABSENCE OF OTHER SPECIFIED PARTS OF DIGESTIVE TRACT: ICD-10-CM

## 2023-02-03 LAB
ALBUMIN SERPL ELPH-MCNC: 4.3 G/DL
ALP BLD-CCNC: 137 U/L
ALT SERPL-CCNC: 28 U/L
ANION GAP SERPL CALC-SCNC: 9 MMOL/L
AST SERPL-CCNC: 28 U/L
BILIRUB SERPL-MCNC: 0.2 MG/DL
BUN SERPL-MCNC: 15 MG/DL
CALCIUM SERPL-MCNC: 9.2 MG/DL
CHLORIDE SERPL-SCNC: 105 MMOL/L
CO2 SERPL-SCNC: 24 MMOL/L
CREAT SERPL-MCNC: 0.68 MG/DL
EGFR: 120 ML/MIN/1.73M2
GLUCOSE SERPL-MCNC: 85 MG/DL
HCG SERPL-MCNC: <1 MIU/ML
POTASSIUM SERPL-SCNC: 3.7 MMOL/L
PROT SERPL-MCNC: 6.9 G/DL
SODIUM SERPL-SCNC: 138 MMOL/L

## 2023-02-03 RX ORDER — OMEPRAZOLE 40 MG/1
40 CAPSULE, DELAYED RELEASE ORAL
Qty: 30 | Refills: 3 | Status: ACTIVE | COMMUNITY
Start: 2022-02-11 | End: 1900-01-01

## 2023-02-07 ENCOUNTER — APPOINTMENT (OUTPATIENT)
Dept: INTERNAL MEDICINE | Facility: CLINIC | Age: 33
End: 2023-02-07
Payer: MEDICAID

## 2023-02-07 VITALS
HEIGHT: 60 IN | HEART RATE: 68 BPM | DIASTOLIC BLOOD PRESSURE: 84 MMHG | TEMPERATURE: 98.7 F | WEIGHT: 162 LBS | OXYGEN SATURATION: 100 % | RESPIRATION RATE: 16 BRPM | BODY MASS INDEX: 31.8 KG/M2 | SYSTOLIC BLOOD PRESSURE: 125 MMHG

## 2023-02-07 DIAGNOSIS — D64.9 ANEMIA, UNSPECIFIED: ICD-10-CM

## 2023-02-07 DIAGNOSIS — F41.9 ANXIETY DISORDER, UNSPECIFIED: ICD-10-CM

## 2023-02-07 DIAGNOSIS — R10.11 RIGHT UPPER QUADRANT PAIN: ICD-10-CM

## 2023-02-07 DIAGNOSIS — R74.8 ABNORMAL LEVELS OF OTHER SERUM ENZYMES: ICD-10-CM

## 2023-02-07 PROCEDURE — 99214 OFFICE O/P EST MOD 30 MIN: CPT | Mod: GC

## 2023-02-08 ENCOUNTER — APPOINTMENT (OUTPATIENT)
Dept: HEMATOLOGY ONCOLOGY | Facility: CLINIC | Age: 33
End: 2023-02-08
Payer: MEDICAID

## 2023-02-08 ENCOUNTER — TRANSCRIPTION ENCOUNTER (OUTPATIENT)
Age: 33
End: 2023-02-08

## 2023-02-08 ENCOUNTER — APPOINTMENT (OUTPATIENT)
Dept: INTERNAL MEDICINE | Facility: CLINIC | Age: 33
End: 2023-02-08
Payer: MEDICAID

## 2023-02-08 DIAGNOSIS — D50.9 IRON DEFICIENCY ANEMIA, UNSPECIFIED: ICD-10-CM

## 2023-02-08 PROBLEM — R10.11 COLICKY RUQ ABDOMINAL PAIN: Status: ACTIVE | Noted: 2021-12-01

## 2023-02-08 PROBLEM — D64.9 ANEMIA: Status: ACTIVE | Noted: 2022-06-30

## 2023-02-08 PROBLEM — R74.8 ELEVATED ALKALINE PHOSPHATASE LEVEL: Status: ACTIVE | Noted: 2022-06-30

## 2023-02-08 PROBLEM — F41.9 ANXIETY: Status: ACTIVE | Noted: 2022-01-14

## 2023-02-08 PROCEDURE — 99203 OFFICE O/P NEW LOW 30 MIN: CPT | Mod: 25

## 2023-02-08 PROCEDURE — 36415 COLL VENOUS BLD VENIPUNCTURE: CPT

## 2023-02-08 NOTE — HISTORY OF PRESENT ILLNESS
[de-identified] : 32 years old [place] female s/p gastric bypass, developed iron deficiency anemia... Feeling very tired..

## 2023-02-08 NOTE — ASSESSMENT
[FreeTextEntry1] : Repeat blood work...\par \par Once  results available we will start patient on intravenous iron

## 2023-02-09 ENCOUNTER — TRANSCRIPTION ENCOUNTER (OUTPATIENT)
Age: 33
End: 2023-02-09

## 2023-02-09 DIAGNOSIS — E53.8 DEFICIENCY OF OTHER SPECIFIED B GROUP VITAMINS: ICD-10-CM

## 2023-02-09 LAB
FERRITIN SERPL-MCNC: 6 NG/ML
FOLATE SERPL-MCNC: 3.6 NG/ML
IRON SATN MFR SERPL: 9 %
IRON SERPL-MCNC: 34 UG/DL
MITOCHONDRIA AB SER IF-ACNC: NORMAL
TIBC SERPL-MCNC: 394 UG/DL
TRANSFERRIN SERPL-MCNC: 305 MG/DL
UIBC SERPL-MCNC: 361 UG/DL
VIT B12 SERPL-MCNC: 521 PG/ML

## 2023-02-10 LAB
25(OH)D3 SERPL-MCNC: 24 NG/ML
ALBUMIN SERPL ELPH-MCNC: 4.4 G/DL
ALP BLD-CCNC: 143 U/L
ALT SERPL-CCNC: 42 U/L
ANION GAP SERPL CALC-SCNC: 11 MMOL/L
AST SERPL-CCNC: 25 U/L
BASOPHILS # BLD AUTO: 0.01 K/UL
BASOPHILS NFR BLD AUTO: 0.2 %
BILIRUB SERPL-MCNC: 0.2 MG/DL
BUN SERPL-MCNC: 17 MG/DL
CALCIUM SERPL-MCNC: 8.9 MG/DL
CHLORIDE SERPL-SCNC: 106 MMOL/L
CO2 SERPL-SCNC: 23 MMOL/L
CREAT SERPL-MCNC: 0.67 MG/DL
EGFR: 121 ML/MIN/1.73M2
EOSINOPHIL # BLD AUTO: 0.08 K/UL
EOSINOPHIL NFR BLD AUTO: 1.4 %
ERYTHROCYTE [SEDIMENTATION RATE] IN BLOOD BY WESTERGREN METHOD: 11 MM/HR
FERRITIN SERPL-MCNC: 7 NG/ML
GLUCOSE SERPL-MCNC: 84 MG/DL
HAPTOGLOB SERPL-MCNC: 43 MG/DL
HCT VFR BLD CALC: 32 %
HGB BLD-MCNC: 10 G/DL
IMM GRANULOCYTES NFR BLD AUTO: 0.2 %
IRON SATN MFR SERPL: 6 %
IRON SERPL-MCNC: 26 UG/DL
LDH SERPL-CCNC: 211 U/L
LYMPHOCYTES # BLD AUTO: 2.15 K/UL
LYMPHOCYTES NFR BLD AUTO: 36.8 %
MAN DIFF?: NORMAL
MCHC RBC-ENTMCNC: 26 PG
MCHC RBC-ENTMCNC: 31.3 GM/DL
MCV RBC AUTO: 83.3 FL
MONOCYTES # BLD AUTO: 0.38 K/UL
MONOCYTES NFR BLD AUTO: 6.5 %
NEUTROPHILS # BLD AUTO: 3.22 K/UL
NEUTROPHILS NFR BLD AUTO: 54.9 %
PLATELET # BLD AUTO: 218 K/UL
POTASSIUM SERPL-SCNC: 3.9 MMOL/L
PROT SERPL-MCNC: 6.9 G/DL
RBC # BLD: 3.84 M/UL
RBC # FLD: 16.3 %
SODIUM SERPL-SCNC: 140 MMOL/L
TIBC SERPL-MCNC: 403 UG/DL
TSH SERPL-ACNC: 2.6 UIU/ML
UIBC SERPL-MCNC: 377 UG/DL
VIT B12 SERPL-MCNC: 517 PG/ML
WBC # FLD AUTO: 5.85 K/UL

## 2023-02-10 NOTE — ASSESSMENT
[FreeTextEntry1] : Repeat blood work done... Patient's hemoglobin is 10 g/dL stable... I encourage patient to take oral iron as tolerated.  Those results were discussed in detail with patient.\par \par Orders for intravenous iron placed, to be given once we get authorization from patient's insurance...\par \par Repeat CBC 2 weeks after her last dose of intravenous iron.\par \par \par I also discussed with patient vitamin D deficiency, and she was given printed information about oral vitamin D replacement.\par \par Follow-up with hematology as indicated

## 2023-02-10 NOTE — HISTORY OF PRESENT ILLNESS
[de-identified] : 32 years old female s/p gastric bypass developed, iron deficiency anemia and is feeling very tired...\par \par Patient admits to heavy menses recently

## 2023-02-10 NOTE — CONSULT LETTER
[Dear  ___] : Dear  [unfilled], [Consult Letter:] : I had the pleasure of evaluating your patient, [unfilled]. [Please see my note below.] : Please see my note below. [Consult Closing:] : Thank you very much for allowing me to participate in the care of this patient.  If you have any questions, please do not hesitate to contact me. [Sincerely,] : Sincerely, [FreeTextEntry3] : Padmaja Jama MD\par

## 2023-02-11 ENCOUNTER — APPOINTMENT (OUTPATIENT)
Dept: ULTRASOUND IMAGING | Facility: HOSPITAL | Age: 33
End: 2023-02-11

## 2023-02-11 ENCOUNTER — OUTPATIENT (OUTPATIENT)
Dept: OUTPATIENT SERVICES | Facility: HOSPITAL | Age: 33
LOS: 1 days | End: 2023-02-11
Payer: COMMERCIAL

## 2023-02-11 DIAGNOSIS — Z98.891 HISTORY OF UTERINE SCAR FROM PREVIOUS SURGERY: Chronic | ICD-10-CM

## 2023-02-11 DIAGNOSIS — Z98.84 BARIATRIC SURGERY STATUS: Chronic | ICD-10-CM

## 2023-02-11 PROCEDURE — 76705 ECHO EXAM OF ABDOMEN: CPT

## 2023-02-11 PROCEDURE — 76705 ECHO EXAM OF ABDOMEN: CPT | Mod: 26

## 2023-02-13 LAB — ANA SER IF-ACNC: NEGATIVE

## 2023-02-16 ENCOUNTER — APPOINTMENT (OUTPATIENT)
Dept: BARIATRICS | Facility: CLINIC | Age: 33
End: 2023-02-16
Payer: MEDICAID

## 2023-02-16 ENCOUNTER — APPOINTMENT (OUTPATIENT)
Dept: BARIATRICS | Facility: CLINIC | Age: 33
End: 2023-02-16

## 2023-02-16 VITALS
BODY MASS INDEX: 32.3 KG/M2 | SYSTOLIC BLOOD PRESSURE: 133 MMHG | TEMPERATURE: 97.4 F | OXYGEN SATURATION: 98 % | WEIGHT: 164.5 LBS | DIASTOLIC BLOOD PRESSURE: 80 MMHG | HEIGHT: 60 IN | HEART RATE: 66 BPM

## 2023-02-16 PROCEDURE — 99204 OFFICE O/P NEW MOD 45 MIN: CPT

## 2023-02-16 NOTE — ASSESSMENT
[FreeTextEntry1] : Patient is seen by me for the first time. Pt is 8 months s/p MDS (3/17/21), doing well. Has had gallbladder removed several months late. She has been experiencing HB daily, regurgitation daily. No CP or dysphagia. She was 89 before the surgery and she is now 89. Taking MV daily. She had lab work which showed low folate and vitamin D that she is now supplementing. \par \par She has been experiencing RUQ pain for the past 2 weeks. She experiences the pain 1 a day usually 30 minutes after eating. This pain is sometimes associated to abdominal distention which was relieved by a BM. Some nausea and no vomiting. \par \par Discussed with patient all possible etiologies of the pain. At this time we will r/o ulcer vs obstruction with an EGD and CT enterography

## 2023-02-16 NOTE — HISTORY OF PRESENT ILLNESS
[de-identified] : Patient is seen by me for the first time. Pt is 8 months s/p MDS (3/17/21), doing well. Has had gallbladder removed several months late. She has been experiencing HB daily, regurgitation daily. No CP or dysphagia. She was 337 before the surgery and she is now 164. Taking MV daily. She had lab work which showed low folate and vitamin D that she is now supplementing. \par \par She has been experiencing RUQ pain for the past 2 weeks. She experiences the pain 1 a day usually 30 minutes after eating. This pain is sometimes associated to abdominal distention which was relieved by a BM. Some nausea and no vomiting. \par \par Discussed with patient all possible etiologies of the pain. At this time we will r/o ulcer vs obstruction with an EGD and CT enterography

## 2023-02-20 ENCOUNTER — INPATIENT (INPATIENT)
Facility: HOSPITAL | Age: 33
LOS: 1 days | Discharge: HOME | DRG: 392 | End: 2023-02-22
Attending: GENERAL ACUTE CARE HOSPITAL | Admitting: GENERAL ACUTE CARE HOSPITAL
Payer: COMMERCIAL

## 2023-02-20 VITALS
SYSTOLIC BLOOD PRESSURE: 128 MMHG | RESPIRATION RATE: 16 BRPM | DIASTOLIC BLOOD PRESSURE: 85 MMHG | TEMPERATURE: 98 F | HEIGHT: 60 IN | OXYGEN SATURATION: 100 % | HEART RATE: 67 BPM | WEIGHT: 164.02 LBS

## 2023-02-20 DIAGNOSIS — Z98.891 HISTORY OF UTERINE SCAR FROM PREVIOUS SURGERY: Chronic | ICD-10-CM

## 2023-02-20 DIAGNOSIS — Z98.84 BARIATRIC SURGERY STATUS: Chronic | ICD-10-CM

## 2023-02-20 LAB
ALBUMIN SERPL ELPH-MCNC: 3.7 G/DL — SIGNIFICANT CHANGE UP (ref 3.3–5)
ALP SERPL-CCNC: 172 U/L — HIGH (ref 40–120)
ALT FLD-CCNC: 109 U/L — HIGH (ref 10–45)
ANION GAP SERPL CALC-SCNC: 8 MMOL/L — SIGNIFICANT CHANGE UP (ref 5–17)
APPEARANCE UR: CLEAR — SIGNIFICANT CHANGE UP
AST SERPL-CCNC: 268 U/L — HIGH (ref 10–40)
BACTERIA # UR AUTO: SIGNIFICANT CHANGE UP /HPF
BASOPHILS # BLD AUTO: 0.03 K/UL — SIGNIFICANT CHANGE UP (ref 0–0.2)
BASOPHILS NFR BLD AUTO: 0.5 % — SIGNIFICANT CHANGE UP (ref 0–2)
BILIRUB SERPL-MCNC: 0.5 MG/DL — SIGNIFICANT CHANGE UP (ref 0.2–1.2)
BILIRUB UR-MCNC: NEGATIVE — SIGNIFICANT CHANGE UP
BUN SERPL-MCNC: 10 MG/DL — SIGNIFICANT CHANGE UP (ref 7–23)
CALCIUM SERPL-MCNC: 8.8 MG/DL — SIGNIFICANT CHANGE UP (ref 8.4–10.5)
CHLORIDE SERPL-SCNC: 105 MMOL/L — SIGNIFICANT CHANGE UP (ref 96–108)
CO2 SERPL-SCNC: 26 MMOL/L — SIGNIFICANT CHANGE UP (ref 22–31)
COLOR SPEC: YELLOW — SIGNIFICANT CHANGE UP
CREAT SERPL-MCNC: 0.7 MG/DL — SIGNIFICANT CHANGE UP (ref 0.5–1.3)
DIFF PNL FLD: ABNORMAL
EGFR: 119 ML/MIN/1.73M2 — SIGNIFICANT CHANGE UP
EOSINOPHIL # BLD AUTO: 0.1 K/UL — SIGNIFICANT CHANGE UP (ref 0–0.5)
EOSINOPHIL NFR BLD AUTO: 1.7 % — SIGNIFICANT CHANGE UP (ref 0–6)
EPI CELLS # UR: SIGNIFICANT CHANGE UP /HPF (ref 0–5)
GLUCOSE SERPL-MCNC: 95 MG/DL — SIGNIFICANT CHANGE UP (ref 70–99)
GLUCOSE UR QL: NEGATIVE — SIGNIFICANT CHANGE UP
HCG UR QL: NEGATIVE — SIGNIFICANT CHANGE UP
HCT VFR BLD CALC: 30.4 % — LOW (ref 34.5–45)
HGB BLD-MCNC: 9.5 G/DL — LOW (ref 11.5–15.5)
IMM GRANULOCYTES NFR BLD AUTO: 0.3 % — SIGNIFICANT CHANGE UP (ref 0–0.9)
KETONES UR-MCNC: NEGATIVE — SIGNIFICANT CHANGE UP
LEUKOCYTE ESTERASE UR-ACNC: ABNORMAL
LIDOCAIN IGE QN: 10 U/L — SIGNIFICANT CHANGE UP (ref 7–60)
LYMPHOCYTES # BLD AUTO: 2.22 K/UL — SIGNIFICANT CHANGE UP (ref 1–3.3)
LYMPHOCYTES # BLD AUTO: 38 % — SIGNIFICANT CHANGE UP (ref 13–44)
MCHC RBC-ENTMCNC: 26 PG — LOW (ref 27–34)
MCHC RBC-ENTMCNC: 31.3 GM/DL — LOW (ref 32–36)
MCV RBC AUTO: 83.3 FL — SIGNIFICANT CHANGE UP (ref 80–100)
MONOCYTES # BLD AUTO: 0.58 K/UL — SIGNIFICANT CHANGE UP (ref 0–0.9)
MONOCYTES NFR BLD AUTO: 9.9 % — SIGNIFICANT CHANGE UP (ref 2–14)
NEUTROPHILS # BLD AUTO: 2.89 K/UL — SIGNIFICANT CHANGE UP (ref 1.8–7.4)
NEUTROPHILS NFR BLD AUTO: 49.6 % — SIGNIFICANT CHANGE UP (ref 43–77)
NITRITE UR-MCNC: NEGATIVE — SIGNIFICANT CHANGE UP
NRBC # BLD: 0 /100 WBCS — SIGNIFICANT CHANGE UP (ref 0–0)
PH UR: 8.5 — HIGH (ref 5–8)
PLATELET # BLD AUTO: 221 K/UL — SIGNIFICANT CHANGE UP (ref 150–400)
POTASSIUM SERPL-MCNC: 3.7 MMOL/L — SIGNIFICANT CHANGE UP (ref 3.5–5.3)
POTASSIUM SERPL-SCNC: 3.7 MMOL/L — SIGNIFICANT CHANGE UP (ref 3.5–5.3)
PROT SERPL-MCNC: 6.6 G/DL — SIGNIFICANT CHANGE UP (ref 6–8.3)
PROT UR-MCNC: 30 MG/DL
RBC # BLD: 3.65 M/UL — LOW (ref 3.8–5.2)
RBC # FLD: 16.2 % — HIGH (ref 10.3–14.5)
RBC CASTS # UR COMP ASSIST: ABNORMAL /HPF
SARS-COV-2 RNA SPEC QL NAA+PROBE: NEGATIVE — SIGNIFICANT CHANGE UP
SODIUM SERPL-SCNC: 139 MMOL/L — SIGNIFICANT CHANGE UP (ref 135–145)
SP GR SPEC: 1.01 — SIGNIFICANT CHANGE UP (ref 1–1.03)
UROBILINOGEN FLD QL: 4 E.U./DL
WBC # BLD: 5.84 K/UL — SIGNIFICANT CHANGE UP (ref 3.8–10.5)
WBC # FLD AUTO: 5.84 K/UL — SIGNIFICANT CHANGE UP (ref 3.8–10.5)
WBC UR QL: < 5 /HPF — SIGNIFICANT CHANGE UP

## 2023-02-20 PROCEDURE — 76705 ECHO EXAM OF ABDOMEN: CPT | Mod: 26

## 2023-02-20 PROCEDURE — 74177 CT ABD & PELVIS W/CONTRAST: CPT | Mod: 26,MA

## 2023-02-20 PROCEDURE — 99285 EMERGENCY DEPT VISIT HI MDM: CPT

## 2023-02-20 RX ORDER — FERROUS SULFATE 325(65) MG
1 TABLET ORAL
Qty: 0 | Refills: 0 | DISCHARGE

## 2023-02-20 RX ORDER — IOHEXOL 300 MG/ML
30 INJECTION, SOLUTION INTRAVENOUS ONCE
Refills: 0 | Status: DISCONTINUED | OUTPATIENT
Start: 2023-02-20 | End: 2023-02-20

## 2023-02-20 RX ORDER — ACETAMINOPHEN 500 MG
1000 TABLET ORAL ONCE
Refills: 0 | Status: DISCONTINUED | OUTPATIENT
Start: 2023-02-20 | End: 2023-02-21

## 2023-02-20 RX ORDER — PANTOPRAZOLE SODIUM 20 MG/1
40 TABLET, DELAYED RELEASE ORAL
Refills: 0 | Status: DISCONTINUED | OUTPATIENT
Start: 2023-02-20 | End: 2023-02-22

## 2023-02-20 RX ORDER — HEPARIN SODIUM 5000 [USP'U]/ML
5000 INJECTION INTRAVENOUS; SUBCUTANEOUS EVERY 8 HOURS
Refills: 0 | Status: DISCONTINUED | OUTPATIENT
Start: 2023-02-20 | End: 2023-02-22

## 2023-02-20 RX ORDER — SODIUM CHLORIDE 9 MG/ML
1000 INJECTION, SOLUTION INTRAVENOUS
Refills: 0 | Status: DISCONTINUED | OUTPATIENT
Start: 2023-02-20 | End: 2023-02-20

## 2023-02-20 RX ORDER — ACETAMINOPHEN 500 MG
1000 TABLET ORAL ONCE
Refills: 0 | Status: COMPLETED | OUTPATIENT
Start: 2023-02-20 | End: 2023-02-20

## 2023-02-20 RX ORDER — FOLIC ACID 0.8 MG
1 TABLET ORAL
Qty: 0 | Refills: 0 | DISCHARGE

## 2023-02-20 RX ORDER — SODIUM CHLORIDE 9 MG/ML
1000 INJECTION INTRAMUSCULAR; INTRAVENOUS; SUBCUTANEOUS ONCE
Refills: 0 | Status: COMPLETED | OUTPATIENT
Start: 2023-02-20 | End: 2023-02-20

## 2023-02-20 RX ORDER — ONDANSETRON 8 MG/1
4 TABLET, FILM COATED ORAL ONCE
Refills: 0 | Status: COMPLETED | OUTPATIENT
Start: 2023-02-20 | End: 2023-02-20

## 2023-02-20 RX ORDER — DIATRIZOATE MEGLUMINE 180 MG/ML
30 INJECTION, SOLUTION INTRAVESICAL ONCE
Refills: 0 | Status: COMPLETED | OUTPATIENT
Start: 2023-02-20 | End: 2023-02-20

## 2023-02-20 RX ADMIN — HEPARIN SODIUM 5000 UNIT(S): 5000 INJECTION INTRAVENOUS; SUBCUTANEOUS at 22:13

## 2023-02-20 RX ADMIN — HEPARIN SODIUM 5000 UNIT(S): 5000 INJECTION INTRAVENOUS; SUBCUTANEOUS at 16:16

## 2023-02-20 RX ADMIN — SODIUM CHLORIDE 1000 MILLILITER(S): 9 INJECTION INTRAMUSCULAR; INTRAVENOUS; SUBCUTANEOUS at 07:38

## 2023-02-20 RX ADMIN — DIATRIZOATE MEGLUMINE 30 MILLILITER(S): 180 INJECTION, SOLUTION INTRAVESICAL at 07:40

## 2023-02-20 RX ADMIN — PANTOPRAZOLE SODIUM 40 MILLIGRAM(S): 20 TABLET, DELAYED RELEASE ORAL at 16:16

## 2023-02-20 RX ADMIN — Medication 400 MILLIGRAM(S): at 07:39

## 2023-02-20 RX ADMIN — ONDANSETRON 4 MILLIGRAM(S): 8 TABLET, FILM COATED ORAL at 07:39

## 2023-02-20 NOTE — H&P ADULT - NSHPPHYSICALEXAM_GEN_ALL_CORE
T(C): 36.8 (02-20-23 @ 08:52), Max: 36.8 (02-20-23 @ 08:52)  HR: 66 (02-20-23 @ 08:52) (66 - 67)  BP: 116/75 (02-20-23 @ 08:52) (116/75 - 128/85)  RR: 18 (02-20-23 @ 08:52) (16 - 18)  SpO2: 100% (02-20-23 @ 08:52) (100% - 100%)    GENERAL: NAD, Resting comfortably in bed, awake, opens eyes spontaneously  HEENT: NCAT, MMM, Normal conjunctiva  RESP: Nonlabored breathing on room air, No respiratory distress  CARD: Normal rate, Normal peripheral perfusion  GI: Soft, ND, moderately tender RUQ>RLQ, No guarding, No rebound tenderness  EXTREM: WWP, No edema, No gross deformity of extremities  SKIN: No rashes, no lesions  NEURO: Awake and alert, No focal motor or sensory deficits  PSYCH: Affect and characteristics of appearance, verbalizations, and behaviors are appropriate

## 2023-02-20 NOTE — PATIENT PROFILE ADULT - FALL HARM RISK - UNIVERSAL INTERVENTIONS
Bed in lowest position, wheels locked, appropriate side rails in place/Call bell, personal items and telephone in reach/Instruct patient to call for assistance before getting out of bed or chair/Non-slip footwear when patient is out of bed/[place] to call system/Physically safe environment - no spills, clutter or unnecessary equipment/Purposeful Proactive Rounding/Room/bathroom lighting operational, light cord in reach

## 2023-02-20 NOTE — ED ADULT NURSE NOTE - NSIMPLEMENTINTERV_GEN_ALL_ED
Implemented All Universal Safety Interventions:  [place] to call system. Call bell, personal items and telephone within reach. Instruct patient to call for assistance. Room bathroom lighting operational. Non-slip footwear when patient is off stretcher. Physically safe environment: no spills, clutter or unnecessary equipment. Stretcher in lowest position, wheels locked, appropriate side rails in place.

## 2023-02-20 NOTE — ED ADULT TRIAGE NOTE - HEIGHT IN FEET
Ongoing SW/CM Assessment/Plan of Care Note     See SW/CM flowsheets for goals and other objective data.    Patient/Family discharge goal (s):  Goal #1: Psychosocial needs assessed  Goal #2: Transportation arranged or issues addressed  Goal #3: Home Care arranged or issues addressed    PT Recommendation:  Recommendation for Discharge: PT: Sub-acute nursing home    OT Recommendation:  Recommendations for Discharge: OT: Sub-acute nursing home    SLP Recommendation:       Disposition:  Planned Discharge Destination: Home/apartment with Spouse/SO    Progress note:   DC planning. Per OFT, team recommends [facility] upon dc. Pt's class status is OBS at this time.   Met with pt and his family (two sons and wife). Discussed [facility] options with class status of OBS vs IP. They understood and had no questions at this time. Provided [facility] list for [facility]. They prefer referrals made to 1) [facility], 2) [facility], 3) [facility], 4) [facility], 5) [facility].     With pt's permission, writer electronically faxed the IP CM SNF Discharge Placement report.  Eval and insurance check is  pending. F/U when new information is known.      5

## 2023-02-20 NOTE — ED PROVIDER NOTE - CLINICAL SUMMARY MEDICAL DECISION MAKING FREE TEXT BOX
32-year-old female with past medical history of gastric sleeve and cholecystectomy presenting with chief complaint of nausea, irregular bowel movements, and abdominal pain.  Acute on chronic.  Ongoing for a month, worse in the last week.  On exam, vital signs stable, diffusely tender abdomen with voluntary guarding.  Some CVA tenderness on the right.  Symptoms could be secondary to gastritis, as patient is being worked up for this outpatient.  Given irregular bowel movements will assess for obstruction.  Given urinary frequency will also assess for possible pyelo.   Plan for labs, CAT scan, fluids, antiemetics, analgesics.  Will reassess to dispo. 32-year-old female with past medical history of gastric sleeve and cholecystectomy presenting with chief complaint of nausea, irregular bowel movements, and abdominal pain.  Acute on chronic.  Ongoing for a month, worse in the last week.  On exam, vital signs stable, diffusely tender abdomen with voluntary guarding.  Some CVA tenderness on the right.  Symptoms could be secondary to gastritis, as patient is being worked up for this outpatient.  Given irregular bowel movements will assess for obstruction.  Given urinary frequency will also assess for possible pyelo.   Plan for labs, CAT scan, fluids, antiemetics, analgesics.  Will reassess to dispo.    Paul Sanders, ED Attending: Surgery consulted given new finding of hiatal hernia,  recommended right upper quadrant ultrasound per bariatric attending.  GI consult also placed on behalf of surgical team.  Patient updated on all findings and plan.  Will admit to surgery for further assessment, and possible EGD by GI tomorrow.

## 2023-02-20 NOTE — ED ADULT TRIAGE NOTE - CHIEF COMPLAINT QUOTE
Pt presents with c/o "RUQ abdominal pain", onset x 2 days, +nausea, denies any V/D. Hx weight loss sx 2021 (duodenal switch). Also reports urinary frequency x 1 day.

## 2023-02-20 NOTE — ED PROVIDER NOTE - OBJECTIVE STATEMENT
32-year-old female with past medical history  of gastric sleeve, cholecystectomy back in 2021 presenting with chief complaint of abdominal pain and nausea.    Pain is mostly right upper quadrant and radiates to the right flank. Patient states that she was having the symptoms at the end of January, at that time was intermittent–patient was seen in the ER here, no actionable findings on labs or imaging.  Has been following up with her doctors, planning for endoscopy, but symptoms have gotten worse in the past week.  Now constant.  No vomiting.  States that she has not had a normal bowel movement.  Last Thursday.  Unsure if passing gas.  No fevers, chills.  Patient does endorse 1 day of urinary symptoms -   Dysuria and urinary frequency.

## 2023-02-20 NOTE — ED ADULT NURSE NOTE - OBJECTIVE STATEMENT
"Pt presents with c/o "RUQ abdominal pain", onset x 2 days, +nausea, denies any V/D. Hx weight loss sx 2021 (duodenal switch). Also reports urinary frequency x 1 day."     defer to triage note, pt confirms information. pt denies fever, cp, sob, vomiting, bloody stools. last BM Thursday. reports normal BMs prior.

## 2023-02-20 NOTE — H&P ADULT - ASSESSMENT
32-year-old female with PMHx of MO s/p robotically-assisted duodenal switch (2021, Dr. Avendaño), DEVANTE, prediabetes, anxiety and PSHx of laparoscopic cholecystectomy (2021, Dr. Avendaño) and  section () is admitted for R abdominal pain.        IN THE ED:  - Vitals:  97.7, HR 67, /85, RR 16, O2S 100% on room air  - Serum Labs:  WBC 5.84, Hgb 9.5, Alk Ph 172, , , Lipase 10, UA (+) RBC, LE, blood, pH 8.5  - CT Imaging:  negative for obstruction, mild anasarca, re-demonstrated mild periportal edema similar to prior study 2023, nonspecific, can be seen with overhydration, hepatitis, and hypoalbuminemia  - RUQ US:  negative for CBD abnormality (diameter 7mm) and no gallstones      PLAN  - Admit to General Surgery  - NGT to LIWS  - NPO with IVF maintenance  - Pain/antiemetic management  - GI PPx (Protonix)  - Add-on for Endoscopy 2023  - DVT PPx (SCD, SQH)  - Please page Surgery Team 2 at [phone] with any questions and/or clinical changes     32-year-old female with PMHx of MO s/p robotically-assisted duodenal switch (2021, Dr. Avendaño), DEVANTE, prediabetes, anxiety and PSHx of laparoscopic cholecystectomy (2021, Dr. Avendaño) and  section () is admitted for R abdominal pain.        IN THE ED:  - Vitals:  97.7, HR 67, /85, RR 16, O2S 100% on room air  - Serum Labs:  WBC 5.84, Hgb 9.5, Alk Ph 172, , , Lipase 10, UA (+) RBC, LE, blood, pH 8.5  - CT Imaging:  negative for obstruction, mild anasarca, re-demonstrated mild periportal edema similar to prior study 2023, nonspecific, can be seen with overhydration, hepatitis, and hypoalbuminemia  - RUQ US:  negative for CBD abnormality (diameter 7mm) and no gallstones      PLAN  - Admit to General Surgery  - Clear liquid diet  - Pain/antiemetic management  - GI PPx (Protonix)  - Add-on for Endoscopy 2023  - DVT PPx (SCD, SQH)  - Please page Surgery Team 2 at [phone] with any questions and/or clinical changes

## 2023-02-20 NOTE — H&P ADULT - NSHPSOCIALHISTORY_GEN_ALL_CORE
Patient resides with her fiance and is employed at [employer] in [place]. She denies any history of smoking tobacco and use of illicit/recreational drugs.  She endorses social consumption of EtOH ([place]).

## 2023-02-20 NOTE — H&P ADULT - HISTORY OF PRESENT ILLNESS
HPI: 32-year-old female with PMHx of MO s/p robotically-assisted duodenal switch (2021, Dr. Avendaño), DEVANTE, prediabetes, anxiety and PSHx of laparoscopic cholecystectomy (2021, Dr. Avendaño) and  section () presented with complaints of abdominal pain.  Patient reports she has intermittently had abdominal pain and decreased PO tolerance since 2022; however, she has not experienced pain with this level of severity, 8.5/10.  The pain is described as sharp and pinching on the right side of the abdomen without radiation.  Associated symptoms include nausea and constipatin.  No medications were tried at home to alleviate the pain. Her last bowel movement was yesterday and of normal consistency and color (nonbloody).  She has never had a colonoscopy or endoscopy.      PAST MEDICAL HISTORY:  - Anxierty  - Iron Deficiency Anemia  - Morbid obesity  - Prediabetes      PAST SURGICAL HISTORY:  - Biliopancreatic diversion with duodenal switch  -  ()      MEDICATIONS (HOME):  - Folic Acid 1mg QD  - Ferrous Sulfate mg Q2D      MEDICATIONS  (STANDING):    MEDICATIONS  (PRN):    Allergies:  No Known Allergies    Intolerances:  None      FAMILY HISTORY:  - Father: Hypercoagulable Disorder  - Mother:  Hypertension, Diabetes Mellitus

## 2023-02-20 NOTE — H&P ADULT - NSHPLABSRESULTS_GEN_ALL_CORE
LABS:                        9.5    5.84  )-----------( 221      ( 20 Feb 2023 06:59 )             30.4     02-20    139  |  105  |  10  ----------------------------<  95  3.7   |  26  |  0.70    Ca    8.8      20 Feb 2023 06:59    TPro  6.6  /  Alb  3.7  /  TBili  0.5  /  DBili  x   /  AST  268<H>  /  ALT  109<H>  /  AlkPhos  172<H>  02-20      LIVER FUNCTIONS - ( 20 Feb 2023 06:59 )  Alb: 3.7 g/dL / Pro: 6.6 g/dL / ALK PHOS: 172 U/L / ALT: 109 U/L / AST: 268 U/L / GGT: x             RADIOLOGY & ADDITIONAL STUDIES:  CT Abdomen and Pelvis w/ Oral Cont and w/ IV Cont:   ACC: [id number] EXAM:  CT ABDOMEN AND PELVIS OC IC   ORDERED BY: CHING LAZO     PROCEDURE DATE:  02/20/2023          INTERPRETATION:  CLINICAL INFORMATION: Evaluate for obstruction. History   gastric sleeve. Right upper quadrant abdominal pain, similar symptoms at   end of January.    COMPARISON: Abdominal ultrasound February 11, 2023. CT abdomen pelvis   January 31, 2023, CT abdomen pelvis December 7, 2021. Upper GI series   August 11, 2021.    CONTRAST/COMPLICATIONS:  IV Contrast: Isovue 370  95 cc administered  Oral Contrast: Gastroview  Complications: None reported at time of study completion    PROCEDURE:  CT of the Abdomen and Pelvis was performed.  Sagittal and coronal reformats were performed.    FINDINGS:  LOWER CHEST: Within normal limits.    LIVER: Mild periportal edema. Otherwise unremarkable.  BILE DUCTS: Normal caliber.  GALLBLADDER: Cholecystectomy.  SPLEEN: Within normal limits.  PANCREAS: Within normal limits.  ADRENALS: Within normal limits.  KIDNEYS/URETERS: Within normal limits.    BLADDER: Within normal limits.  REPRODUCTIVE ORGANS: Uterus and adnexa within normal limits.    BOWEL: Small hiatal post gastric sleeve with duodenal switch procedure.   No gastric outlet obstruction or inflammatory perigastric fat   infiltration. No small or large bowel obstruction. Appendix is normal.  PERITONEUM: No ascites.  VESSELS: Within normal limits.  RETROPERITONEUM/LYMPH NODES: No lymphadenopathy.  ABDOMINAL WALL: Mild anasarca.  BONES: Chronic bilateral pars defects L5.    IMPRESSION:  No bowel obstruction. Unchanged small hiatal post gastric sleeve and   duodenal switch procedure.  Redemonstrated mild periportal edema similar to prior study January 31, 2023, nonspecific, can be seen with overhydration, hepatitis, and   hypoalbuminemia.    --- End of Report ---      PIEDAD BALTAZAR MD; Attending Radiologist  This document has been electronically signed. Feb 20 2023  9:24AM (02-20-23 @ 09:07)

## 2023-02-21 ENCOUNTER — TRANSCRIPTION ENCOUNTER (OUTPATIENT)
Age: 33
End: 2023-02-21

## 2023-02-21 ENCOUNTER — RESULT REVIEW (OUTPATIENT)
Age: 33
End: 2023-02-21

## 2023-02-21 ENCOUNTER — NON-APPOINTMENT (OUTPATIENT)
Age: 33
End: 2023-02-21

## 2023-02-21 LAB
24R-OH-CALCIDIOL SERPL-MCNC: 21.2 NG/ML — LOW (ref 30–80)
ANION GAP SERPL CALC-SCNC: 11 MMOL/L — SIGNIFICANT CHANGE UP (ref 5–17)
APTT BLD: 39.4 SEC — HIGH (ref 27.5–35.5)
BASOPHILS # BLD AUTO: 0.02 K/UL — SIGNIFICANT CHANGE UP (ref 0–0.2)
BASOPHILS NFR BLD AUTO: 0.6 % — SIGNIFICANT CHANGE UP (ref 0–2)
BUN SERPL-MCNC: 8 MG/DL — SIGNIFICANT CHANGE UP (ref 7–23)
CALCIUM SERPL-MCNC: 8.8 MG/DL — SIGNIFICANT CHANGE UP (ref 8.4–10.5)
CALCIUM SERPL-MCNC: 8.9 MG/DL — SIGNIFICANT CHANGE UP (ref 8.4–10.5)
CHLORIDE SERPL-SCNC: 103 MMOL/L — SIGNIFICANT CHANGE UP (ref 96–108)
CO2 SERPL-SCNC: 23 MMOL/L — SIGNIFICANT CHANGE UP (ref 22–31)
CREAT SERPL-MCNC: 0.67 MG/DL — SIGNIFICANT CHANGE UP (ref 0.5–1.3)
EGFR: 121 ML/MIN/1.73M2 — SIGNIFICANT CHANGE UP
EOSINOPHIL # BLD AUTO: 0.12 K/UL — SIGNIFICANT CHANGE UP (ref 0–0.5)
EOSINOPHIL NFR BLD AUTO: 3.6 % — SIGNIFICANT CHANGE UP (ref 0–6)
FERRITIN SERPL-MCNC: 10 NG/ML — LOW (ref 15–150)
FOLATE SERPL-MCNC: 6.2 NG/ML — SIGNIFICANT CHANGE UP
GLUCOSE SERPL-MCNC: 83 MG/DL — SIGNIFICANT CHANGE UP (ref 70–99)
HAV IGM SER-ACNC: SIGNIFICANT CHANGE UP
HBV CORE AB SER-ACNC: SIGNIFICANT CHANGE UP
HBV CORE IGM SER-ACNC: SIGNIFICANT CHANGE UP
HBV SURFACE AB SER-ACNC: REACTIVE
HBV SURFACE AG SER-ACNC: SIGNIFICANT CHANGE UP
HCT VFR BLD CALC: 28 % — LOW (ref 34.5–45)
HCV AB S/CO SERPL IA: 0.05 S/CO — SIGNIFICANT CHANGE UP
HCV AB SERPL-IMP: SIGNIFICANT CHANGE UP
HGB BLD-MCNC: 9 G/DL — LOW (ref 11.5–15.5)
IMM GRANULOCYTES NFR BLD AUTO: 0.3 % — SIGNIFICANT CHANGE UP (ref 0–0.9)
INR BLD: 1.27 — HIGH (ref 0.88–1.16)
IRON SATN MFR SERPL: 16 % — SIGNIFICANT CHANGE UP (ref 14–50)
IRON SATN MFR SERPL: 52 UG/DL — SIGNIFICANT CHANGE UP (ref 30–160)
LYMPHOCYTES # BLD AUTO: 1.58 K/UL — SIGNIFICANT CHANGE UP (ref 1–3.3)
LYMPHOCYTES # BLD AUTO: 47.7 % — HIGH (ref 13–44)
MAGNESIUM SERPL-MCNC: 1.9 MG/DL — SIGNIFICANT CHANGE UP (ref 1.6–2.6)
MCHC RBC-ENTMCNC: 26.6 PG — LOW (ref 27–34)
MCHC RBC-ENTMCNC: 32.1 GM/DL — SIGNIFICANT CHANGE UP (ref 32–36)
MCV RBC AUTO: 82.8 FL — SIGNIFICANT CHANGE UP (ref 80–100)
MONOCYTES # BLD AUTO: 0.22 K/UL — SIGNIFICANT CHANGE UP (ref 0–0.9)
MONOCYTES NFR BLD AUTO: 6.6 % — SIGNIFICANT CHANGE UP (ref 2–14)
NEUTROPHILS # BLD AUTO: 1.36 K/UL — LOW (ref 1.8–7.4)
NEUTROPHILS NFR BLD AUTO: 41.2 % — LOW (ref 43–77)
NRBC # BLD: 0 /100 WBCS — SIGNIFICANT CHANGE UP (ref 0–0)
PHOSPHATE SERPL-MCNC: 4.2 MG/DL — SIGNIFICANT CHANGE UP (ref 2.5–4.5)
PLATELET # BLD AUTO: 212 K/UL — SIGNIFICANT CHANGE UP (ref 150–400)
POTASSIUM SERPL-MCNC: 3.9 MMOL/L — SIGNIFICANT CHANGE UP (ref 3.5–5.3)
POTASSIUM SERPL-SCNC: 3.9 MMOL/L — SIGNIFICANT CHANGE UP (ref 3.5–5.3)
PREALB SERPL-MCNC: 12 MG/DL — LOW (ref 20–40)
PROTHROM AB SERPL-ACNC: 15.1 SEC — HIGH (ref 10.5–13.4)
PTH-INTACT FLD-MCNC: 69 PG/ML — HIGH (ref 15–65)
RBC # BLD: 3.38 M/UL — LOW (ref 3.8–5.2)
RBC # FLD: 15.9 % — HIGH (ref 10.3–14.5)
SODIUM SERPL-SCNC: 137 MMOL/L — SIGNIFICANT CHANGE UP (ref 135–145)
TIBC SERPL-MCNC: 319 UG/DL — SIGNIFICANT CHANGE UP (ref 220–430)
TRANSFERRIN SERPL-MCNC: 281 MG/DL — SIGNIFICANT CHANGE UP (ref 200–360)
UIBC SERPL-MCNC: 267 UG/DL — SIGNIFICANT CHANGE UP (ref 110–370)
VIT B12 SERPL-MCNC: 741 PG/ML — SIGNIFICANT CHANGE UP (ref 232–1245)
WBC # BLD: 3.31 K/UL — LOW (ref 3.8–10.5)
WBC # FLD AUTO: 3.31 K/UL — LOW (ref 3.8–10.5)

## 2023-02-21 PROCEDURE — 88305 TISSUE EXAM BY PATHOLOGIST: CPT | Mod: 26

## 2023-02-21 PROCEDURE — 74183 MRI ABD W/O CNTR FLWD CNTR: CPT | Mod: 26

## 2023-02-21 PROCEDURE — 43239 EGD BIOPSY SINGLE/MULTIPLE: CPT

## 2023-02-21 RX ORDER — IRON SUCROSE 20 MG/ML
100 INJECTION, SOLUTION INTRAVENOUS ONCE
Refills: 0 | Status: COMPLETED | OUTPATIENT
Start: 2023-02-21 | End: 2023-02-21

## 2023-02-21 RX ORDER — THIAMINE MONONITRATE (VIT B1) 100 MG
500 TABLET ORAL DAILY
Refills: 0 | Status: DISCONTINUED | OUTPATIENT
Start: 2023-02-21 | End: 2023-02-22

## 2023-02-21 RX ORDER — ONDANSETRON 8 MG/1
4 TABLET, FILM COATED ORAL EVERY 6 HOURS
Refills: 0 | Status: DISCONTINUED | OUTPATIENT
Start: 2023-02-21 | End: 2023-02-22

## 2023-02-21 RX ORDER — SODIUM CHLORIDE 9 MG/ML
1000 INJECTION, SOLUTION INTRAVENOUS
Refills: 0 | Status: DISCONTINUED | OUTPATIENT
Start: 2023-02-21 | End: 2023-02-22

## 2023-02-21 RX ORDER — ONDANSETRON 8 MG/1
4 TABLET, FILM COATED ORAL EVERY 6 HOURS
Refills: 0 | Status: DISCONTINUED | OUTPATIENT
Start: 2023-02-21 | End: 2023-02-21

## 2023-02-21 RX ADMIN — PANTOPRAZOLE SODIUM 40 MILLIGRAM(S): 20 TABLET, DELAYED RELEASE ORAL at 06:39

## 2023-02-21 RX ADMIN — ONDANSETRON 4 MILLIGRAM(S): 8 TABLET, FILM COATED ORAL at 21:31

## 2023-02-21 RX ADMIN — SODIUM CHLORIDE 100 MILLILITER(S): 9 INJECTION, SOLUTION INTRAVENOUS at 03:56

## 2023-02-21 RX ADMIN — Medication 105 MILLIGRAM(S): at 11:16

## 2023-02-21 RX ADMIN — IRON SUCROSE 210 MILLIGRAM(S): 20 INJECTION, SOLUTION INTRAVENOUS at 10:28

## 2023-02-21 RX ADMIN — HEPARIN SODIUM 5000 UNIT(S): 5000 INJECTION INTRAVENOUS; SUBCUTANEOUS at 21:31

## 2023-02-21 RX ADMIN — HEPARIN SODIUM 5000 UNIT(S): 5000 INJECTION INTRAVENOUS; SUBCUTANEOUS at 14:26

## 2023-02-21 RX ADMIN — SODIUM CHLORIDE 100 MILLILITER(S): 9 INJECTION, SOLUTION INTRAVENOUS at 18:30

## 2023-02-21 RX ADMIN — HEPARIN SODIUM 5000 UNIT(S): 5000 INJECTION INTRAVENOUS; SUBCUTANEOUS at 06:39

## 2023-02-21 RX ADMIN — PANTOPRAZOLE SODIUM 40 MILLIGRAM(S): 20 TABLET, DELAYED RELEASE ORAL at 18:30

## 2023-02-21 NOTE — CONSULT NOTE ADULT - ASSESSMENT
32-year-old female with PMHx of MO s/p robotically-assisted duodenal switch (2021, Dr. Avendaño), DEVANTE, prediabetes and PSHx of laparoscopic cholecystectomy (2021, Dr. Avendaño) and  section () is admitted for RUQ and epigastric pain with elevated liver enzymes    Hepatology consulted regarding elevated liver enzymes    #Abdominal Pain  #Elevated liver enzymes    Liver morphology/size unremarkable on CT and US.  with mild periportal edema    Pattern of liver enzymes c/w cholestatic disease.  Hepatic dysfunction/cholestatic disease can be seen following duodenal switch.  Cannot rule out acute infection, particularly as AST/ALT have been nml as outpt, though alk phos persistently elevated (120s-160s).  No clear offending new med/supplement to suggest DILI. No recent ETOH use to explain acute elvation.    Recommendations:  -Will f/u results of EGD with Dr. Alonso  -Check the following labs:  ---Acute hepatitis panel  ---CMV PCR  ---EBV PCR  ---HSV PCR  ---INR/PT/PTT  -Trend daily CBC, CMP, Coags  -Avoid hepatotoxins    Recommendations discussed with hepatologist    Levi Gaviria DO  Gastroenterology Fellow  Pager: [phone]

## 2023-02-21 NOTE — CONSULT NOTE ADULT - SUBJECTIVE AND OBJECTIVE BOX
HPI: 32-year-old female with PMHx of morbid obesity s/p robotically-assisted duodenal switch (2021, Dr. Avendaño), DEVANTE, prediabetes, anxiety and PSHx of laparoscopic cholecystectomy (2021, Dr. Avendaño) and  section () who presented to the ED on  with acute abdominal pain.      Ms. Sosa notes that she has intermittently had abdominal pain and decreased PO tolerance since 2022; however, she has not experienced pain with this level of severity, 8.5/10 that prompted ED eval.    The pain is described as sharp and pinching on the right side of the abdomen without radiation.  Associated symptoms include nausea and constipation  No medications were tried at home to alleviate the pain. Her last bowel movement was yesterday and of normal consistency and color (nonbloody).  She has never had a colonoscopy or endoscopy.    Has had episodic, mostly post-prandial abd bloating/pain for several months which usually improves with time, however recent episode of pain more severe and different in character,    GI consulted for elevated liver enzymes.    No recent/new medications or supplements  Occasional ETOH intake, no recent binge      PAST MEDICAL HISTORY:  - Anxiety  - Iron Deficiency Anemia  - Morbid obesity  - Prediabetes    PAST SURGICAL HISTORY:  - Biliopancreatic diversion with duodenal switch  -  ()      MEDICATIONS (HOME):  - Folic Acid 1mg QD  - Ferrous Sulfate mg Q2D    Allergies:  No Known Allergies    Intolerances:  None    FAMILY HISTORY:  - Father: Hypercoagulable Disorder  - Mother:  Hypertension, Diabetes Mellitus   (2023 14:03)    SOCIAL HISTORY: Denies tobacco use. Occasional ETOH    REVIEW OF SYSTEMS:  All other 10 review of systems is negative unless indicated above.    Vital Signs Last 24 Hrs  T(C): 36.8 (2023 12:10), Max: 37.1 (2023 08:22)  T(F): 98.3 (2023 12:10), Max: 98.7 (2023 08:22)  HR: 79 (2023 12:10) (62 - 79)  BP: 110/71 (2023 12:10) (107/67 - 118/70)  BP(mean): --  RR: 18 (2023 12:10) (17 - 18)  SpO2: 100% (2023 12:10) (99% - 100%)    Parameters below as of 2023 12:10  Patient On (Oxygen Delivery Method): room air         @ 07:  -   @ 07:00  --------------------------------------------------------  IN: 600 mL / OUT: 550 mL / NET: 50 mL     @ 07:01   @ 16:23  --------------------------------------------------------  IN: 1200 mL / OUT: 670 mL / NET: 530 mL        PHYSICAL EXAM:    General: No acute distress  Eyes: Anicteric sclerae, moist conjunctivae  HENT: Moist mucous membranes  Neck: Trachea midline, supple  Lungs: Normal respiratory effort and no intercostal retractions  Cardiovascular: RRR  Abdomen: Soft, RUQ TTP non-distended; No rebound or guarding  Extremities: No edema  Neurological: Alert and oriented x3  Skin: Warm and dry. No obvious rash    LABS:                        9.0    3.31  )-----------( 212      ( 2023 06:55 )             28.0         137  |  103  |  8   ----------------------------<  83  3.9   |  23  |  0.67    Ca    8.9      2023 06:55  Phos  4.2       Mg     1.9         TPro  6.6  /  Alb  3.7  /  TBili  0.5  /  DBili  x   /  AST  268<H>  /  ALT  109<H>  /  AlkPhos  172<H>  20            Urinalysis with Rflx Culture (collected 2023 06:59)      RADIOLOGY & ADDITIONAL STUDIES:       ACC: [id number] EXAM:  CT ABDOMEN AND PELVIS OC IC   ORDERED BY: CHING GOODSON DATE:  2023          INTERPRETATION:  CLINICAL INFORMATION: Evaluate for obstruction. History   gastric sleeve. Right upper quadrant abdominal pain, similar symptoms at   end of January.    COMPARISON: Abdominal ultrasound 2023. CT abdomen pelvis   2023, CT abdomen pelvis 2021. Upper GI series   2021.    CONTRAST/COMPLICATIONS:  IV Contrast: Isovue 370  95 cc administered  Oral Contrast: Gastroview  Complications: None reported at time of study completion    PROCEDURE:  CT of the Abdomen and Pelvis was performed.  Sagittal and coronal reformats were performed.    FINDINGS:  LOWER CHEST: Within normal limits.    LIVER: Mild periportal edema. Otherwise unremarkable.  BILE DUCTS: Normal caliber.  GALLBLADDER: Cholecystectomy.  SPLEEN: Within normal limits.  PANCREAS: Within normal limits.  ADRENALS: Within normal limits.  KIDNEYS/URETERS: Within normal limits.    BLADDER: Within normal limits.  REPRODUCTIVE ORGANS: Uterus and adnexa within normal limits.    BOWEL: Small hiatal post gastric sleeve with duodenal switch procedure.   No gastric outlet obstruction or inflammatory perigastric fat   infiltration. No small or large bowel obstruction. Appendix is normal.  PERITONEUM: No ascites.  VESSELS: Within normal limits.  RETROPERITONEUM/LYMPH NODES: No lymphadenopathy.  ABDOMINAL WALL: Mild anasarca.  BONES: Chronic bilateral pars defects L5.    IMPRESSION:  No bowel obstruction. Unchanged small hiatal post gastric sleeve and   duodenal switch procedure.  Redemonstrated mild periportal edema similar to prior study 2023, nonspecific, can be seen with overhydration, hepatitis, and   hypoalbuminemia.    --- End of Report ---            PIEDAD BALTAZAR MD; Attending Radiologist  This document has been electronically signed. 2023  9:24AM      ACC: [id number] EXAM:  US ABDOMEN LIMITED   ORDERED BY: CHING LAZO     PROCEDURE DATE:  2023          INTERPRETATION:  CLINICAL INFORMATION: Right upper quadrant abdominal   pain.    COMPARISON: CT abdomen pelvis 2023.    TECHNIQUE: Sonography of the right upper quadrant.    FINDINGS:    Liver: Within normal limits.  Bile ducts: Normal caliber. Common bile duct measures 7 mm.  Gallbladder: Cholecystectomy.  Pancreas: Visualized portions are within normal limits.  Right kidney: 12.4 cm. No hydronephrosis.  Ascites: None.  IVC: Visualized portions are within normal limits.    IMPRESSION:    No acute sonographic abnormality identified post cholecystectomy.        --- End of Report ---            PIEDAD BALTAZAR MD; Attending Radiologist  This document has been electronically signed. 2023  1:14PM

## 2023-02-21 NOTE — PROGRESS NOTE ADULT - ASSESSMENT
32-year-old female with PMHx of MO s/p robotically-assisted duodenal switch (2021, Dr. Avendaño), DEVANTE, prediabetes, anxiety and PSHx of laparoscopic cholecystectomy (2021, Dr. Avendaño) and  section () is admitted for RUQ and epigastric pain. US neg CBD dilation. CT w/o obstruction. Planned for Endoscopy       NPO/IVF  pain/nausea medication PRN  IV iron x1  Thimaine  PPI  F/u Endoscopy 2023  HSQ/SCDs/OOB

## 2023-02-22 ENCOUNTER — TRANSCRIPTION ENCOUNTER (OUTPATIENT)
Age: 33
End: 2023-02-22

## 2023-02-22 VITALS
RESPIRATION RATE: 16 BRPM | HEART RATE: 71 BPM | SYSTOLIC BLOOD PRESSURE: 114 MMHG | TEMPERATURE: 99 F | DIASTOLIC BLOOD PRESSURE: 72 MMHG | OXYGEN SATURATION: 98 %

## 2023-02-22 LAB
ALBUMIN SERPL ELPH-MCNC: 3.7 G/DL — SIGNIFICANT CHANGE UP (ref 3.3–5)
ALP SERPL-CCNC: 157 U/L — HIGH (ref 40–120)
ALT FLD-CCNC: 60 U/L — HIGH (ref 10–45)
ANION GAP SERPL CALC-SCNC: 9 MMOL/L — SIGNIFICANT CHANGE UP (ref 5–17)
APTT BLD: 38.2 SEC — HIGH (ref 27.5–35.5)
AST SERPL-CCNC: 37 U/L — SIGNIFICANT CHANGE UP (ref 10–40)
BILIRUB SERPL-MCNC: 0.4 MG/DL — SIGNIFICANT CHANGE UP (ref 0.2–1.2)
BUN SERPL-MCNC: 5 MG/DL — LOW (ref 7–23)
CALCIUM SERPL-MCNC: 9 MG/DL — SIGNIFICANT CHANGE UP (ref 8.4–10.5)
CHLORIDE SERPL-SCNC: 106 MMOL/L — SIGNIFICANT CHANGE UP (ref 96–108)
CO2 SERPL-SCNC: 26 MMOL/L — SIGNIFICANT CHANGE UP (ref 22–31)
CREAT SERPL-MCNC: 0.67 MG/DL — SIGNIFICANT CHANGE UP (ref 0.5–1.3)
EGFR: 121 ML/MIN/1.73M2 — SIGNIFICANT CHANGE UP
GLUCOSE SERPL-MCNC: 80 MG/DL — SIGNIFICANT CHANGE UP (ref 70–99)
HCT VFR BLD CALC: 28.7 % — LOW (ref 34.5–45)
HGB BLD-MCNC: 9 G/DL — LOW (ref 11.5–15.5)
HSV DNA1: SIGNIFICANT CHANGE UP
HSV DNA2: SIGNIFICANT CHANGE UP
HSV1 DNA BLD QL NAA+PROBE: SIGNIFICANT CHANGE UP
HSV2 DNA BLD QL NAA+PROBE: SIGNIFICANT CHANGE UP
INR BLD: 1.27 — HIGH (ref 0.88–1.16)
MAGNESIUM SERPL-MCNC: 2 MG/DL — SIGNIFICANT CHANGE UP (ref 1.6–2.6)
MCHC RBC-ENTMCNC: 26.3 PG — LOW (ref 27–34)
MCHC RBC-ENTMCNC: 31.4 GM/DL — LOW (ref 32–36)
MCV RBC AUTO: 83.9 FL — SIGNIFICANT CHANGE UP (ref 80–100)
NRBC # BLD: 0 /100 WBCS — SIGNIFICANT CHANGE UP (ref 0–0)
PHOSPHATE SERPL-MCNC: 4 MG/DL — SIGNIFICANT CHANGE UP (ref 2.5–4.5)
PLATELET # BLD AUTO: 223 K/UL — SIGNIFICANT CHANGE UP (ref 150–400)
POTASSIUM SERPL-MCNC: 4.1 MMOL/L — SIGNIFICANT CHANGE UP (ref 3.5–5.3)
POTASSIUM SERPL-SCNC: 4.1 MMOL/L — SIGNIFICANT CHANGE UP (ref 3.5–5.3)
PROT SERPL-MCNC: 5.9 G/DL — LOW (ref 6–8.3)
PROTHROM AB SERPL-ACNC: 15.1 SEC — HIGH (ref 10.5–13.4)
RBC # BLD: 3.42 M/UL — LOW (ref 3.8–5.2)
RBC # FLD: 15.9 % — HIGH (ref 10.3–14.5)
SODIUM SERPL-SCNC: 141 MMOL/L — SIGNIFICANT CHANGE UP (ref 135–145)
SURGICAL PATHOLOGY STUDY: SIGNIFICANT CHANGE UP
VIT D25+D1,25 OH+D1,25 PNL SERPL-MCNC: 57.3 PG/ML — SIGNIFICANT CHANGE UP (ref 19.9–79.3)
WBC # BLD: 3.97 K/UL — SIGNIFICANT CHANGE UP (ref 3.8–10.5)
WBC # FLD AUTO: 3.97 K/UL — SIGNIFICANT CHANGE UP (ref 3.8–10.5)

## 2023-02-22 PROCEDURE — 87340 HEPATITIS B SURFACE AG IA: CPT

## 2023-02-22 PROCEDURE — 85730 THROMBOPLASTIN TIME PARTIAL: CPT

## 2023-02-22 PROCEDURE — 87635 SARS-COV-2 COVID-19 AMP PRB: CPT

## 2023-02-22 PROCEDURE — 87529 HSV DNA AMP PROBE: CPT

## 2023-02-22 PROCEDURE — 99285 EMERGENCY DEPT VISIT HI MDM: CPT

## 2023-02-22 PROCEDURE — 86705 HEP B CORE ANTIBODY IGM: CPT

## 2023-02-22 PROCEDURE — 81025 URINE PREGNANCY TEST: CPT

## 2023-02-22 PROCEDURE — 86664 EPSTEIN-BARR NUCLEAR ANTIGEN: CPT

## 2023-02-22 PROCEDURE — 88305 TISSUE EXAM BY PATHOLOGIST: CPT

## 2023-02-22 PROCEDURE — 84100 ASSAY OF PHOSPHORUS: CPT

## 2023-02-22 PROCEDURE — 82306 VITAMIN D 25 HYDROXY: CPT

## 2023-02-22 PROCEDURE — 86706 HEP B SURFACE ANTIBODY: CPT

## 2023-02-22 PROCEDURE — 83970 ASSAY OF PARATHORMONE: CPT

## 2023-02-22 PROCEDURE — 82746 ASSAY OF FOLIC ACID SERUM: CPT

## 2023-02-22 PROCEDURE — 87496 CYTOMEG DNA AMP PROBE: CPT

## 2023-02-22 PROCEDURE — 96375 TX/PRO/DX INJ NEW DRUG ADDON: CPT

## 2023-02-22 PROCEDURE — 83735 ASSAY OF MAGNESIUM: CPT

## 2023-02-22 PROCEDURE — G0378: CPT

## 2023-02-22 PROCEDURE — 86663 EPSTEIN-BARR ANTIBODY: CPT

## 2023-02-22 PROCEDURE — 36415 COLL VENOUS BLD VENIPUNCTURE: CPT

## 2023-02-22 PROCEDURE — 84466 ASSAY OF TRANSFERRIN: CPT

## 2023-02-22 PROCEDURE — 85025 COMPLETE CBC W/AUTO DIFF WBC: CPT

## 2023-02-22 PROCEDURE — 82310 ASSAY OF CALCIUM: CPT

## 2023-02-22 PROCEDURE — 80048 BASIC METABOLIC PNL TOTAL CA: CPT

## 2023-02-22 PROCEDURE — 84134 ASSAY OF PREALBUMIN: CPT

## 2023-02-22 PROCEDURE — 86803 HEPATITIS C AB TEST: CPT

## 2023-02-22 PROCEDURE — 86709 HEPATITIS A IGM ANTIBODY: CPT

## 2023-02-22 PROCEDURE — 85610 PROTHROMBIN TIME: CPT

## 2023-02-22 PROCEDURE — 83690 ASSAY OF LIPASE: CPT

## 2023-02-22 PROCEDURE — 80053 COMPREHEN METABOLIC PANEL: CPT

## 2023-02-22 PROCEDURE — 86665 EPSTEIN-BARR CAPSID VCA: CPT

## 2023-02-22 PROCEDURE — 82652 VIT D 1 25-DIHYDROXY: CPT

## 2023-02-22 PROCEDURE — A9585: CPT

## 2023-02-22 PROCEDURE — 81001 URINALYSIS AUTO W/SCOPE: CPT

## 2023-02-22 PROCEDURE — 74183 MRI ABD W/O CNTR FLWD CNTR: CPT

## 2023-02-22 PROCEDURE — 74177 CT ABD & PELVIS W/CONTRAST: CPT | Mod: MA

## 2023-02-22 PROCEDURE — 85027 COMPLETE CBC AUTOMATED: CPT

## 2023-02-22 PROCEDURE — 86704 HEP B CORE ANTIBODY TOTAL: CPT

## 2023-02-22 PROCEDURE — 82607 VITAMIN B-12: CPT

## 2023-02-22 PROCEDURE — 76705 ECHO EXAM OF ABDOMEN: CPT

## 2023-02-22 PROCEDURE — 82728 ASSAY OF FERRITIN: CPT

## 2023-02-22 PROCEDURE — 96374 THER/PROPH/DIAG INJ IV PUSH: CPT

## 2023-02-22 PROCEDURE — 83550 IRON BINDING TEST: CPT

## 2023-02-22 PROCEDURE — 83540 ASSAY OF IRON: CPT

## 2023-02-22 RX ORDER — CHOLECALCIFEROL (VITAMIN D3) 125 MCG
1 CAPSULE ORAL
Qty: 30 | Refills: 0
Start: 2023-02-22

## 2023-02-22 RX ORDER — IBUPROFEN 200 MG
600 TABLET ORAL ONCE
Refills: 0 | Status: COMPLETED | OUTPATIENT
Start: 2023-02-22 | End: 2023-02-22

## 2023-02-22 RX ORDER — CHOLECALCIFEROL (VITAMIN D3) 125 MCG
1000 CAPSULE ORAL DAILY
Refills: 0 | Status: DISCONTINUED | OUTPATIENT
Start: 2023-02-22 | End: 2023-02-22

## 2023-02-22 RX ADMIN — Medication 600 MILLIGRAM(S): at 11:31

## 2023-02-22 RX ADMIN — SODIUM CHLORIDE 100 MILLILITER(S): 9 INJECTION, SOLUTION INTRAVENOUS at 11:32

## 2023-02-22 RX ADMIN — PANTOPRAZOLE SODIUM 40 MILLIGRAM(S): 20 TABLET, DELAYED RELEASE ORAL at 06:57

## 2023-02-22 RX ADMIN — HEPARIN SODIUM 5000 UNIT(S): 5000 INJECTION INTRAVENOUS; SUBCUTANEOUS at 06:56

## 2023-02-22 RX ADMIN — Medication 600 MILLIGRAM(S): at 11:36

## 2023-02-22 RX ADMIN — Medication 1000 UNIT(S): at 18:27

## 2023-02-22 RX ADMIN — Medication 105 MILLIGRAM(S): at 11:31

## 2023-02-22 RX ADMIN — HEPARIN SODIUM 5000 UNIT(S): 5000 INJECTION INTRAVENOUS; SUBCUTANEOUS at 13:56

## 2023-02-22 NOTE — DISCHARGE NOTE NURSING/CASE MANAGEMENT/SOCIAL WORK - NSDCPEFALRISK_GEN_ALL_CORE
For information on Fall & Injury Prevention, visit: [email or web address] OR  [email or web address] OR  https://www.cdc.gov/steadi/patient.html

## 2023-02-22 NOTE — DISCHARGE NOTE PROVIDER - CARE PROVIDER_API CALL
Amaury Alonso)  Surgery  [address] [address]  Phone: [phone]  Fax: [phone]  Follow Up Time: 1 week    Bam Montero)  Gastroenterology; Internal Medicine  [address] [place] [address]  Phone: [phone]  Fax: [phone]  Follow Up Time: 1 week

## 2023-02-22 NOTE — PROGRESS NOTE ADULT - SUBJECTIVE AND OBJECTIVE BOX
INTERVAL HPI/OVERNIGHT EVENTS: JENS, -f/-bm/-n/-v, ND/NT, OOBA. NPO @ Mn, IVF started    SUBJECTIVE: Patient seen and examined bedside by the bariatric surgery team this morning. She reports that her pain is improving and she had a bowel movement yesterday afternoon. No acute complaints. Denies cp, sob, nausea, emesis, or fevers.     MEDICATIONS  (STANDING):  heparin   Injectable 5000 Unit(s) SubCutaneous every 8 hours  iron sucrose IVPB 100 milliGRAM(s) IV Intermittent once  lactated ringers. 1000 milliLiter(s) (100 mL/Hr) IV Continuous <Continuous>  pantoprazole  Injectable 40 milliGRAM(s) IV Push two times a day  thiamine IVPB 500 milliGRAM(s) IV Intermittent daily    MEDICATIONS  (PRN):  acetaminophen   IVPB .. 1000 milliGRAM(s) IV Intermittent once PRN Temp greater or equal to 38C (100.4F), Mild Pain (1 - 3)      Vital Signs Last 24 Hrs  T(C): 37.1 (2023 08:22), Max: 37.2 (2023 12:15)  T(F): 98.7 (2023 08:22), Max: 98.9 (2023 12:15)  HR: 73 (2023 08:22) (62 - 85)  BP: 107/67 (2023 08:22) (101/67 - 121/73)  BP(mean): --  RR: 17 (2023 08:22) (17 - 18)  SpO2: 100% (2023 08:22) (99% - 100%)    Parameters below as of 2023 08:22  Patient On (Oxygen Delivery Method): room air      PHYSICAL EXAM:  Constitutional: A&Ox3  Respiratory: non labored breathing, no respiratory distress  Cardiovascular: NSR, RRR  Gastrointestinal: Abdomen soft, NTND  Genitourinary: Voiding  Extremities: wwp, no calf edema or tenderness      I&O's Detail    2023 07:01  -  2023 07:00  --------------------------------------------------------  IN:    Oral Fluid: 600 mL  Total IN: 600 mL    OUT:    Voided (mL): 550 mL  Total OUT: 550 mL    Total NET: 50 mL      2023 07:01  -  2023 08:57  --------------------------------------------------------  IN:    Lactated Ringers: 200 mL  Total IN: 200 mL    OUT:    Voided (mL): 300 mL  Total OUT: 300 mL    Total NET: -100 mL      LABS:                        9.0    3.31  )-----------( 212      ( 2023 06:55 )             28.0         137  |  103  |  8   ----------------------------<  83  3.9   |  23  |  0.67    Ca    8.9      2023 06:55  Phos  4.2     -  Mg     1.9         TPro  6.6  /  Alb  3.7  /  TBili  0.5  /  DBili  x   /  AST  268<H>  /  ALT  109<H>  /  AlkPhos  172<H>  -    Urinalysis Basic - ( 2023 06:59 )    Color: Yellow / Appearance: Clear / S.015 / pH: x  Gluc: x / Ketone: NEGATIVE  / Bili: Negative / Urobili: 4.0 E.U./dL   Blood: x / Protein: 30 mg/dL / Nitrite: NEGATIVE   Leuk Esterase: Trace / RBC: Many /HPF / WBC < 5 /HPF   Sq Epi: x / Non Sq Epi: 0-5 /HPF / Bacteria: None /HPF      RADIOLOGY & ADDITIONAL STUDIES:
Pt seen and examined at bedside.  NAEO. Abd pain improved, no nausea, vomiting.  Tolerating diet    Allergies    No Known Allergies    Intolerances        MEDICATIONS:  MEDICATIONS  (STANDING):  cholecalciferol 1000 Unit(s) Oral daily  heparin   Injectable 5000 Unit(s) SubCutaneous every 8 hours  ibuprofen  Tablet. 600 milliGRAM(s) Oral once  lactated ringers. 1000 milliLiter(s) (100 mL/Hr) IV Continuous <Continuous>  pantoprazole  Injectable 40 milliGRAM(s) IV Push two times a day  thiamine IVPB 500 milliGRAM(s) IV Intermittent daily    MEDICATIONS  (PRN):  ondansetron Injectable 4 milliGRAM(s) IV Push every 6 hours PRN Nausea and/or Vomiting      Vital Signs Last 24 Hrs  T(C): 36.8 (22 Feb 2023 08:43), Max: 37.1 (21 Feb 2023 11:57)  T(F): 98.3 (22 Feb 2023 08:43), Max: 98.3 (21 Feb 2023 12:10)  HR: 67 (22 Feb 2023 08:43) (67 - 90)  BP: 107/67 (22 Feb 2023 08:43) (105/65 - 115/75)  BP(mean): 88 (21 Feb 2023 17:15) (88 - 88)  RR: 17 (22 Feb 2023 08:43) (17 - 18)  SpO2: 100% (22 Feb 2023 08:43) (99% - 100%)    Parameters below as of 22 Feb 2023 08:43  Patient On (Oxygen Delivery Method): room air        02-21 @ 07:01 - 02-22 @ 07:00  --------------------------------------------------------  IN: 1400 mL / OUT: 970 mL / NET: 430 mL    02-22 @ 07:01  - 02-22 @ 10:07  --------------------------------------------------------  IN: 300 mL / OUT: 0 mL / NET: 300 mL        PHYSICAL EXAM:    General: No acute distress  HEENT: MMM, conjunctiva and sclera clear  Gastrointestinal: Soft RUQ TTP to deep palpation, non-distended. No rebound or guarding  Skin: Warm and dry. No obvious rash    LABS:  CBC Full  -  ( 22 Feb 2023 07:12 )  WBC Count : 3.97 K/uL  RBC Count : 3.42 M/uL  Hemoglobin : 9.0 g/dL  Hematocrit : 28.7 %  Platelet Count - Automated : 223 K/uL  Mean Cell Volume : 83.9 fl  Mean Cell Hemoglobin : 26.3 pg  Mean Cell Hemoglobin Concentration : 31.4 gm/dL  Auto Neutrophil # : x  Auto Lymphocyte # : x  Auto Monocyte # : x  Auto Eosinophil # : x  Auto Basophil # : x  Auto Neutrophil % : x  Auto Lymphocyte % : x  Auto Monocyte % : x  Auto Eosinophil % : x  Auto Basophil % : x    02-22    141  |  106  |  5<L>  ----------------------------<  80  4.1   |  26  |  0.67    Ca    9.0      22 Feb 2023 07:12  Phos  4.0     02-22  Mg     2.0     02-22    TPro  5.9<L>  /  Alb  3.7  /  TBili  0.4  /  DBili  x   /  AST  37  /  ALT  60<H>  /  AlkPhos  157<H>  02-22    PT/INR - ( 22 Feb 2023 07:12 )   PT: 15.1 sec;   INR: 1.27          PTT - ( 22 Feb 2023 07:12 )  PTT:38.2 sec      RADIOLOGY & ADDITIONAL STUDIES:    ACC: [id number] EXAM:  MR MRCP [facility]   ORDERED BY: MAR MENESES     PROCEDURE DATE:  02/21/2023          INTERPRETATION:  CLINICAL INFORMATION: epigastric pain hx of   cholecystectomy    COMPARISON: None.    CONTRAST/COMPLICATIONS:  IV Contrast: Gadavist  7 cc administered  Oral Contrast: NONE  Complications: None reported at time of study completion    PROCEDURE:  MRI of the abdomen was performed.  MRCP was performed.    FINDINGS:  LOWER CHEST: Within normal limits.    LIVER: Within normal limits.  BILE DUCTS: Normal caliber. No choledocholithiasis.  GALLBLADDER: Cholecystectomy.  SPLEEN: Within normal limits.  PANCREAS: Within normal limits.  ADRENALS: Within normal limits.  KIDNEYS/URETERS: Within normal limits.    VISUALIZED PORTIONS:  BOWEL: Within normal limits.  PERITONEUM: No ascites.  VESSELS: Within normal limits.  RETROPERITONEUM/LYMPH NODES: No lymphadenopathy.  ABDOMINAL WALL: Within normal limits.  BONES: Within normal limits.    IMPRESSION:  No acute or relevant findings to explain patient's symptoms.        --- End of Report ---            SADE CAMPA MD; Attending Radiologist  This document has been electronically signed. Feb 22 2023  7:36AM
INTERVAL HPI/OVERNIGHT EVENTS: MCRP ordered, +n/-v x1 zofran. Hepatitis panel neg, coags elev.    SUBJECTIVE: Patient seen and examined bedside by the bariatric surgery team this morning. She reports having a headache this morning, but otherwise she is feeling better than yesterday and her pain is improving. Denies cp, sob, nausea, emesis, or fevers.     MEDICATIONS  (STANDING):  heparin   Injectable 5000 Unit(s) SubCutaneous every 8 hours  ibuprofen  Tablet. 600 milliGRAM(s) Oral once  lactated ringers. 1000 milliLiter(s) (100 mL/Hr) IV Continuous <Continuous>  pantoprazole  Injectable 40 milliGRAM(s) IV Push two times a day  thiamine IVPB 500 milliGRAM(s) IV Intermittent daily    MEDICATIONS  (PRN):  ondansetron Injectable 4 milliGRAM(s) IV Push every 6 hours PRN Nausea and/or Vomiting      Vital Signs Last 24 Hrs  T(C): 36.7 (22 Feb 2023 05:11), Max: 37.1 (21 Feb 2023 08:22)  T(F): 98.1 (22 Feb 2023 05:11), Max: 98.7 (21 Feb 2023 08:22)  HR: 70 (22 Feb 2023 05:11) (70 - 90)  BP: 105/65 (22 Feb 2023 05:11) (105/65 - 115/75)  BP(mean): 88 (21 Feb 2023 17:15) (88 - 88)  RR: 18 (22 Feb 2023 05:11) (17 - 18)  SpO2: 100% (22 Feb 2023 05:11) (99% - 100%)    Parameters below as of 22 Feb 2023 05:11  Patient On (Oxygen Delivery Method): room air      PHYSICAL EXAM:  Constitutional: A&Ox3  Respiratory: non labored breathing, no respiratory distress  Cardiovascular: NSR, RRR  Gastrointestinal: Abdomen soft, NTND  Genitourinary: Voiding  Extremities: wwp, no calf edema or tenderness      I&O's Detail    21 Feb 2023 07:01  -  22 Feb 2023 07:00  --------------------------------------------------------  IN:    IV PiggyBack: 100 mL    IV PiggyBack: 100 mL    Lactated Ringers: 1200 mL  Total IN: 1400 mL    OUT:    Voided (mL): 970 mL  Total OUT: 970 mL    Total NET: 430 mL      LABS:                        9.0    3.31  )-----------( 212      ( 21 Feb 2023 06:55 )             28.0     02-22    141  |  106  |  x   ----------------------------<  x   4.1   |  x   |  x     Ca    8.9      21 Feb 2023 06:55  Phos  4.0     02-22  Mg     2.0     02-22      PT/INR - ( 21 Feb 2023 18:27 )   PT: 15.1 sec;   INR: 1.27          PTT - ( 21 Feb 2023 18:27 )  PTT:39.4 sec      RADIOLOGY & ADDITIONAL STUDIES:

## 2023-02-22 NOTE — DISCHARGE NOTE PROVIDER - NSDCCPCAREPLAN_GEN_ALL_CORE_FT
PRINCIPAL DISCHARGE DIAGNOSIS  Diagnosis: Epigastric pain  Assessment and Plan of Treatment:       SECONDARY DISCHARGE DIAGNOSES  Diagnosis: Hiatal hernia  Assessment and Plan of Treatment:

## 2023-02-22 NOTE — DISCHARGE NOTE PROVIDER - NSDCCPTREATMENT_GEN_ALL_CORE_FT
PRINCIPAL PROCEDURE  Procedure: EGD  Findings and Treatment:       SECONDARY PROCEDURE  Procedure: MRCP (magnetic resonance cholangiopancreatography)  Findings and Treatment:

## 2023-02-22 NOTE — DISCHARGE NOTE PROVIDER - NSDCFUADDINST_GEN_ALL_CORE_FT
Follow up with Dr. Alonso in 1 week. Call the office at [phone] to schedule your appointment.  You should be urinating at least 3-4x per day. Call the office if you experience increasing abdominal pain, nausea, vomiting, or temperature >100.4F.     Medications:  Please continue to take a vitamin D supplement daily     Please call your doctor if you experience any of the following:  -New chest pain, pressure, squeezing or tightness.  -New or worsening cough, shortness of breath or wheeze.  -If you are vomiting and cannot keep down medications.  -You see blood or dark black material when vomiting or moving your bowels.  -You experience burning when you urinate, blood in your urine, or experience discharge.  -Your pain is getting worse changes location or moves to your chest or back.  -You have shaking, chills, or fever greater than 100.5F or 38C.  -Any changes in your symptoms that concern you.    Follow up with Dr. Alonso in 1 week. Call the office at [phone] to schedule your appointment. You should be urinating at least 3-4x per day. Call the office if you experience increasing abdominal pain, nausea, vomiting, or temperature >100.4F.     Follow up with Hepatologist, Dr. Bam Montero. Please call his office to make an appointment [phone].     Medications:  Please continue to take a vitamin D supplement daily     Please call your doctor if you experience any of the following:  -New chest pain, pressure, squeezing or tightness.  -New or worsening cough, shortness of breath or wheeze.  -If you are vomiting and cannot keep down medications.  -You see blood or dark black material when vomiting or moving your bowels.  -You experience burning when you urinate, blood in your urine, or experience discharge.  -Your pain is getting worse changes location or moves to your chest or back.  -You have shaking, chills, or fever greater than 100.5F or 38C.  -Any changes in your symptoms that concern you.

## 2023-02-22 NOTE — PROGRESS NOTE ADULT - ASSESSMENT
32-year-old female with PMHx of MO s/p robotically-assisted duodenal switch (2021, Dr. Avendaño), DEVANTE, prediabetes and PSHx of laparoscopic cholecystectomy (2021, Dr. Avendaño) and  section () is admitted for RUQ and epigastric pain with elevated liver enzymes, overall improving    Hepatology consulted regarding elevated liver enzymes    #Abdominal Pain  #Elevated liver enzymes    Liver morphology/size unremarkable on CT and US.  with mild periportal edema  MRCP unremarkable    Pattern of liver enzymes c/w cholestatic disease. Significant improvement in alk phos, AST/ALT with normalization of AST this AM.  Hepatic dysfunction/cholestatic disease can be seen following duodenal switch.     Cannot rule out acute infection, particularly as AST/ALT have been nml as outpt, though alk phos persistently elevated (120s-160s).  No clear offending new med/supplement to suggest DILI. No recent ETOH use to explain acute elevation.  Unclear explanation for rapid improvement    -No e/o acute Hep A, B, C  -HSV PCR neg    Recommendations:  -F/u EBV, CMV  -Check the following labs:  -Trend daily CBC, CMP, Coags  -Avoid hepatotoxins    Further eval can be pursued as outpt given significant clinical improvement. Will need follow up with hepatologist, Dr. Montero.  We will help arrange follow up.    Levi Gaviria DO  Gastroenterology Fellow  Pager: [phone]   32-year-old female with PMHx of MO s/p robotically-assisted duodenal switch (2021, Dr. Avendaño), DEVANTE, prediabetes and PSHx of laparoscopic cholecystectomy (2021, Dr. Avendaño) and  section () is admitted for RUQ and epigastric pain with elevated liver enzymes, overall improving    Hepatology consulted regarding elevated liver enzymes    #Abdominal Pain  #Elevated liver enzymes    Liver morphology/size unremarkable on CT and US.  with mild periportal edema  MRCP unremarkable    Pattern of liver enzymes c/w cholestatic disease. Significant improvement in alk phos, AST/ALT with normalization of AST this AM.  Hepatic dysfunction/cholestatic disease can be seen following duodenal switch.     Cannot rule out acute infection, particularly as AST/ALT have been nml as outpt, though alk phos persistently elevated (120s-160s).  No clear offending new med/supplement to suggest DILI. No recent ETOH use to explain acute elevation.  Unclear explanation for rapid improvement in absence of intervention.    -No e/o acute Hep A, B, C  -HSV PCR neg    Recommendations:  -F/u EBV, CMV  -Check the following labs:  -Trend daily CBC, CMP, Coags  -Avoid hepatotoxins    Further eval can be pursued as outpt given significant clinical improvement. Will need follow up with hepatologist, Dr. Montero.  We will help arrange follow up.    Discussed with hepatologist, Dr. Winston Gaviria DO  Gastroenterology Fellow  Pager: [phone]

## 2023-02-22 NOTE — DISCHARGE NOTE PROVIDER - NSDCMRMEDTOKEN_GEN_ALL_CORE_FT
ferrous sulfate 324 mg (65 mg elemental iron) oral delayed release tablet: 1 tab(s) orally once a day  folic acid 1 mg oral tablet: 1 tab(s) orally once a day  Multiple Vitamins oral tablet: 1 tab(s) orally once a day

## 2023-02-22 NOTE — DISCHARGE NOTE NURSING/CASE MANAGEMENT/SOCIAL WORK - PATIENT PORTAL LINK FT
You can access the FollowMyHealth Patient Portal offered by [facility] by registering at the following website: [email or web address]. By joining [employer]’s FollowMyHealth portal, you will also be able to view your health information using other applications (apps) compatible with our system.

## 2023-02-22 NOTE — DISCHARGE NOTE PROVIDER - HOSPITAL COURSE
32-year-old female with PMHx of MO s/p robotically-assisted duodenal switch (2021, Dr. Avendaño), DEVANTE, prediabetes, and anxiety. PSHx of laparoscopic cholecystectomy (2021, Dr. Avendaño) and  section () presented with complaints of abdominal pain. Patient reported intermittent abdominal pain and decreased PO tolerance since 2023. On admission the patient's LFTs and Alk phos were elevated. CT imaging from       - CT Imaging:  negative for obstruction, mild anasarca, re-demonstrated mild periportal edema similar to prior study 2023, nonspecific, can be seen with overhydration, hepatitis, and hypoalbuminemia  - RUQ US:  negative for CBD abnormality (diameter 7mm) and no gallstones          : motrin x1 for HA, Vitamin D started, adv. to regular diet and calorie count started for malnutrition, -N/-V, +OOBA, no pain today, tolerating diet  ON: MCRP ordered, +n/-v x1 zofran. Hepatitis panel neg, coags elev.  : thiamine started, IV iron x1, CLD after EGD  ON: JENS, -f/-bm/-n/-v, ND/NT, OOBA. NPO @ [place], IVF started  : admitted for EGD vs biliary pathology.   32-year-old female with PMHx of MO s/p robotically-assisted duodenal switch (2021, Dr. Avendaño), DEVANTE, prediabetes, and anxiety. PSHx of laparoscopic cholecystectomy (2021, Dr. Avendaño) and  section () presented with complaints of abdominal pain. Patient reported intermittent abdominal pain and decreased PO tolerance since 2023. On admission the patient's LFTs and Alk phos were elevated. CT imaging from  was negative for obstruction and showed mild anasarca, re-demonstrated mild periportal edema similar to prior study 2023, nonspecific, can be seen with overhydration, hepatitis, and hypoalbuminemia. RUQ US was negative for CBD abnormality (diameter 7mm) and no gallstones. On  patient had an endoscopy done with Dr. Alonso that showed a hiatal hernia, but was otherwise unremarkable. Gastric antrum and duodenum biopsies were negative. An MRCP was done on  as well and showed no acute or relevant findings to explain the patient's symptoms. The hepatologist recommended labs, including a hepatitis panel and HSV PCR, which came back negative. Results of CMV and EBV are still pending. Patient was advanced to a regular diet on  to increase her nutritional status after multiple days of being NPO. At the time of discharge the GI/hepatologist team felt comfortable following up with the patient in an outpatient setting given her clinical improvement and LFT normalization. Patient's postoperative course was uncomplicated. Diet was advanced as tolerated and pain was well controlled on medication. On the day of discharge, the patient was deemed stable and ready to return home with a plan to follow up as an outpatient with Dr. Alonso and hepatologist, Dr. Montero.    INCOMPLETE, last updated . Med rec incomplete.   32-year-old female with PMHx of MO s/p robotically-assisted duodenal switch (2021, Dr. Avendaño), DEVANTE, prediabetes, and anxiety. PSHx of laparoscopic cholecystectomy (2021, Dr. Avendaño) and  section () presented with complaints of abdominal pain. Patient reported intermittent abdominal pain and decreased PO tolerance since 2023. On admission the patient's LFTs and Alk phos were elevated. CT imaging from  was negative for obstruction and showed mild anasarca, re-demonstrated mild periportal edema similar to prior study 2023, nonspecific, can be seen with overhydration, hepatitis, and hypoalbuminemia. RUQ US was negative for CBD abnormality (diameter 7mm) and no gallstones. On  patient had an endoscopy done with Dr. Alonso that showed a hiatal hernia, but was otherwise unremarkable. Gastric antrum and duodenum biopsies were negative. An MRCP was done on  as well and showed no acute or relevant findings to explain the patient's symptoms. The hepatologist recommended labs, including a hepatitis panel and HSV PCR, which came back negative. Patient was advanced to a regular diet on  to increase her nutritional status after multiple days of being NPO. At the time of discharge the GI/hepatologist team was following up with the patient in an outpatient setting given her clinical improvement and LFT normalization. Patient's postoperative course was uncomplicated. Diet was advanced as tolerated and pain was well controlled on medication. On the day of discharge, the patient was deemed stable and ready to return home with a plan to follow up as an outpatient with Dr. Alonso and hepatologist, Dr. Montero.     32-year-old female with PMHx of MO s/p robotically-assisted duodenal switch (2021, Dr. Avendaño), DEVANTE, prediabetes, and anxiety. PSHx of laparoscopic cholecystectomy (2021, Dr. Avendaño) and  section () presented with complaints of abdominal pain. Patient reported intermittent abdominal pain and decreased PO tolerance since 2023. On admission the patient's LFTs and Alk phos were elevated. CT imaging from  was negative for obstruction and showed mild anasarca, re-demonstrated mild periportal edema similar to prior study 2023, nonspecific, can be seen with overhydration, hepatitis, and hypoalbuminemia. RUQ US was negative for CBD abnormality (diameter 7mm) and no gallstones. On  patient had an endoscopy done with Dr. Alonso that showed a hiatal hernia, but was otherwise unremarkable. Gastric antrum and duodenum biopsies were negative. An MRCP was done on  as well and showed no acute or relevant findings to explain the patient's symptoms. The hepatologist recommended labs, including a hepatitis panel and HSV PCR, which came back negative. Patient was advanced to a regular diet on  to increase her nutritional status after multiple days of being NPO. At the time of discharge the GI/hepatologist team was following up with the patient in an outpatient setting given her clinical improvement and LFT normalization. Patient's hospital course remained uncomplicated. Diet was advanced as tolerated and pain was well controlled on medication. On the day of discharge, the patient was deemed stable and ready to return home with a plan to follow up as an outpatient with Dr. Alonso and hepatologist, Dr. Montero.

## 2023-02-22 NOTE — PROGRESS NOTE ADULT - ASSESSMENT
32-year-old female with PMHx of MO s/p robotically-assisted duodenal switch (2021, Dr. Avendaño), DEVANTE, prediabetes, anxiety and PSHx of laparoscopic cholecystectomy (2021, Dr. Avendaño) and  section () is admitted for RUQ and epigastric pain. US neg CBD dilation. CT w/o obstruction. S/p Endoscopy and MRCP .    CLD/IVF  pain/nausea medication PRN  Thimaine  PPI  F/u hepatologist reccs  F/u Endoscopy and MRCP 2023  HSQ/SCDs/OOB

## 2023-02-22 NOTE — DISCHARGE NOTE PROVIDER - CARE PROVIDERS DIRECT ADDRESSES
[email or web address]

## 2023-02-22 NOTE — DISCHARGE NOTE PROVIDER - PROVIDER TOKENS
PROVIDER:[TOKEN:[[id number]],FOLLOWUP:[1 week]],PROVIDER:[TOKEN:[[id number]],FOLLOWUP:[1 week]]

## 2023-02-23 LAB
CMV DNA SPEC QL NAA+PROBE: SIGNIFICANT CHANGE UP
CMV PCR QUALITATIVE: SIGNIFICANT CHANGE UP

## 2023-02-24 ENCOUNTER — APPOINTMENT (OUTPATIENT)
Dept: INTERNAL MEDICINE | Facility: CLINIC | Age: 33
End: 2023-02-24

## 2023-02-24 ENCOUNTER — NON-APPOINTMENT (OUTPATIENT)
Age: 33
End: 2023-02-24

## 2023-02-24 LAB
EBV EA AB SER IA-ACNC: <5 U/ML — SIGNIFICANT CHANGE UP
EBV EA AB TITR SER IF: POSITIVE
EBV EA IGG SER-ACNC: NEGATIVE — SIGNIFICANT CHANGE UP
EBV NA IGG SER IA-ACNC: >600 U/ML — HIGH
EBV PATRN SPEC IB-IMP: SIGNIFICANT CHANGE UP
EBV VCA IGG AVIDITY SER QL IA: POSITIVE
EBV VCA IGM SER IA-ACNC: <10 U/ML — SIGNIFICANT CHANGE UP
EBV VCA IGM SER IA-ACNC: >750 U/ML — HIGH
EBV VCA IGM TITR FLD: NEGATIVE — SIGNIFICANT CHANGE UP

## 2023-02-28 ENCOUNTER — TRANSCRIPTION ENCOUNTER (OUTPATIENT)
Age: 33
End: 2023-02-28

## 2023-02-28 ENCOUNTER — APPOINTMENT (OUTPATIENT)
Dept: INTERNAL MEDICINE | Facility: CLINIC | Age: 33
End: 2023-02-28
Payer: MEDICAID

## 2023-02-28 VITALS
HEART RATE: 65 BPM | RESPIRATION RATE: 16 BRPM | SYSTOLIC BLOOD PRESSURE: 113 MMHG | OXYGEN SATURATION: 100 % | HEIGHT: 60 IN | WEIGHT: 160 LBS | TEMPERATURE: 97 F | DIASTOLIC BLOOD PRESSURE: 71 MMHG | BODY MASS INDEX: 31.41 KG/M2

## 2023-02-28 DIAGNOSIS — R10.9 UNSPECIFIED ABDOMINAL PAIN: ICD-10-CM

## 2023-02-28 PROCEDURE — 99495 TRANSJ CARE MGMT MOD F2F 14D: CPT | Mod: GC

## 2023-02-28 RX ORDER — CYCLOBENZAPRINE HYDROCHLORIDE 5 MG/1
5 TABLET, FILM COATED ORAL 3 TIMES DAILY
Qty: 9 | Refills: 0 | Status: DISCONTINUED | COMMUNITY
Start: 2022-10-06 | End: 2023-02-28

## 2023-02-28 RX ORDER — CHLORHEXIDINE GLUCONATE 4 %
325 (65 FE) LIQUID (ML) TOPICAL
Qty: 15 | Refills: 2 | Status: DISCONTINUED | COMMUNITY
Start: 2022-08-08 | End: 2023-02-28

## 2023-02-28 RX ORDER — LEVONORGESTREL 1.5 MG/1
1.5 TABLET ORAL
Qty: 1 | Refills: 0 | Status: DISCONTINUED | COMMUNITY
Start: 2022-08-16 | End: 2023-02-28

## 2023-02-28 RX ORDER — ERGOCALCIFEROL 1.25 MG/1
1.25 MG CAPSULE, LIQUID FILLED ORAL
Qty: 12 | Refills: 0 | Status: DISCONTINUED | COMMUNITY
Start: 2021-07-27 | End: 2023-02-28

## 2023-02-28 RX ORDER — ETONOGESTREL 68 MG/1
68 IMPLANT SUBCUTANEOUS
Qty: 1 | Refills: 0 | Status: DISCONTINUED | COMMUNITY
Start: 2021-11-24 | End: 2023-02-28

## 2023-02-28 RX ORDER — ERGOCALCIFEROL 1.25 MG/1
1.25 MG CAPSULE, LIQUID FILLED ORAL
Qty: 12 | Refills: 3 | Status: DISCONTINUED | COMMUNITY
Start: 2022-03-25 | End: 2023-02-28

## 2023-02-28 RX ORDER — ESCITALOPRAM OXALATE 5 MG/1
5 TABLET ORAL DAILY
Qty: 30 | Refills: 2 | Status: DISCONTINUED | COMMUNITY
Start: 2022-01-14 | End: 2023-02-28

## 2023-03-01 ENCOUNTER — OUTPATIENT (OUTPATIENT)
Dept: OUTPATIENT SERVICES | Facility: HOSPITAL | Age: 33
LOS: 1 days | End: 2023-03-01
Payer: COMMERCIAL

## 2023-03-01 ENCOUNTER — APPOINTMENT (OUTPATIENT)
Dept: INFUSION THERAPY | Facility: CLINIC | Age: 33
End: 2023-03-01

## 2023-03-01 VITALS
HEART RATE: 78 BPM | SYSTOLIC BLOOD PRESSURE: 120 MMHG | WEIGHT: 130.07 LBS | RESPIRATION RATE: 18 BRPM | DIASTOLIC BLOOD PRESSURE: 74 MMHG | OXYGEN SATURATION: 99 % | TEMPERATURE: 98 F

## 2023-03-01 VITALS
RESPIRATION RATE: 18 BRPM | DIASTOLIC BLOOD PRESSURE: 78 MMHG | TEMPERATURE: 98 F | HEART RATE: 77 BPM | SYSTOLIC BLOOD PRESSURE: 121 MMHG | OXYGEN SATURATION: 99 %

## 2023-03-01 DIAGNOSIS — Z98.84 BARIATRIC SURGERY STATUS: Chronic | ICD-10-CM

## 2023-03-01 DIAGNOSIS — D50.9 IRON DEFICIENCY ANEMIA, UNSPECIFIED: ICD-10-CM

## 2023-03-01 PROCEDURE — 96365 THER/PROPH/DIAG IV INF INIT: CPT

## 2023-03-01 RX ORDER — FERUMOXYTOL 510 MG/17ML
510 INJECTION INTRAVENOUS ONCE
Refills: 0 | Status: COMPLETED | OUTPATIENT
Start: 2023-03-01 | End: 2023-03-01

## 2023-03-01 RX ADMIN — FERUMOXYTOL 510 MILLIGRAM(S): 510 INJECTION INTRAVENOUS at 09:43

## 2023-03-01 RX ADMIN — FERUMOXYTOL 117 MILLIGRAM(S): 510 INJECTION INTRAVENOUS at 08:43

## 2023-03-02 ENCOUNTER — APPOINTMENT (OUTPATIENT)
Dept: BARIATRICS | Facility: CLINIC | Age: 33
End: 2023-03-02
Payer: MEDICAID

## 2023-03-02 VITALS
BODY MASS INDEX: 32 KG/M2 | OXYGEN SATURATION: 97 % | HEART RATE: 71 BPM | SYSTOLIC BLOOD PRESSURE: 119 MMHG | HEIGHT: 60 IN | WEIGHT: 163 LBS | DIASTOLIC BLOOD PRESSURE: 78 MMHG | TEMPERATURE: 97.3 F

## 2023-03-02 PROCEDURE — 99214 OFFICE O/P EST MOD 30 MIN: CPT

## 2023-03-03 RX ORDER — ERGOCALCIFEROL 1.25 MG/1
1.25 MG CAPSULE, LIQUID FILLED ORAL
Qty: 8 | Refills: 0 | Status: ACTIVE | COMMUNITY
Start: 2023-03-03 | End: 1900-01-01

## 2023-03-06 ENCOUNTER — APPOINTMENT (OUTPATIENT)
Dept: INFUSION THERAPY | Facility: CLINIC | Age: 33
End: 2023-03-06

## 2023-03-06 DIAGNOSIS — Z98.84 BARIATRIC SURGERY STATUS: ICD-10-CM

## 2023-03-06 DIAGNOSIS — K44.9 DIAPHRAGMATIC HERNIA WITHOUT OBSTRUCTION OR GANGRENE: ICD-10-CM

## 2023-03-06 DIAGNOSIS — F41.9 ANXIETY DISORDER, UNSPECIFIED: ICD-10-CM

## 2023-03-06 DIAGNOSIS — Z20.822 CONTACT WITH AND (SUSPECTED) EXPOSURE TO COVID-19: ICD-10-CM

## 2023-03-06 DIAGNOSIS — R10.13 EPIGASTRIC PAIN: ICD-10-CM

## 2023-03-06 DIAGNOSIS — R35.0 FREQUENCY OF MICTURITION: ICD-10-CM

## 2023-03-06 DIAGNOSIS — R74.8 ABNORMAL LEVELS OF OTHER SERUM ENZYMES: ICD-10-CM

## 2023-03-06 DIAGNOSIS — Z90.49 ACQUIRED ABSENCE OF OTHER SPECIFIED PARTS OF DIGESTIVE TRACT: ICD-10-CM

## 2023-03-06 DIAGNOSIS — R73.03 PREDIABETES: ICD-10-CM

## 2023-03-10 NOTE — HISTORY OF PRESENT ILLNESS
[de-identified] : Patient is a 32 year old F s/p MDS on 3/17/21 who presents here today for the follow up of RUQ abdominal pain.  States that she has been feeling better than last week. EGD  from 02/27/2023 was unremarkable. Pathology was negative for H.pylori. Experiences RUQ pain and nausea. Last event of pain was about 3 days ago. Reports of regular bowel movement and denies constipation. She will be following up with  in near future. Ulcer vs obstruction was ruled out with an EGD and CT enterography. Has had 1 iron infusions and is scheduled for 1 more iron infusion. Continue with iron infusion. Will get lab work after the completion of second iron infusion. Will see the nutritionist and follow up in 1 month.

## 2023-03-10 NOTE — ASSESSMENT
[FreeTextEntry1] : Patient is a 32 year old F s/p MDS on 3/17/21 who presents here today for the follow up of RUQ abdominal pain.  States that she has been feeling better than last week. EGD  from 02/27/2023 was unremarkable. Pathology was negative for H.pylori. Experiences RUQ pain and nausea. Last event of pain was about 3 days ago. Reports of regular bowel movement and denies constipation. She will be following up with  in near future. Ulcer vs obstruction was ruled out with an EGD and CT enterography. Has had 1 iron infusions and is scheduled for 1 more iron infusion. Continue with iron infusion. Will get lab work after the completion of second iron infusion. Will see the nutritionist and follow up in 1 month.

## 2023-03-10 NOTE — END OF VISIT
[Time Spent: ___ minutes] : I have spent [unfilled] minutes of time on the encounter. [FreeTextEntry3] : All medical record entries made by the Scribe were at my, STALIN Sales , direction and personally dictated by me on 03/02/2023 . I have reviewed the chart and agree that the record accurately reflects my personal performance of the history, physical exam, assessment and plan. I have also personally directed, reviewed, and agreed with the chart.\par

## 2023-03-13 ENCOUNTER — APPOINTMENT (OUTPATIENT)
Dept: HEPATOLOGY | Facility: CLINIC | Age: 33
End: 2023-03-13

## 2023-03-13 ENCOUNTER — APPOINTMENT (OUTPATIENT)
Dept: HEPATOLOGY | Facility: CLINIC | Age: 33
End: 2023-03-13
Payer: MEDICAID

## 2023-03-13 VITALS
HEART RATE: 57 BPM | BODY MASS INDEX: 32.55 KG/M2 | OXYGEN SATURATION: 98 % | DIASTOLIC BLOOD PRESSURE: 83 MMHG | SYSTOLIC BLOOD PRESSURE: 128 MMHG | WEIGHT: 165.8 LBS | HEIGHT: 60 IN

## 2023-03-13 DIAGNOSIS — R74.01 ELEVATION OF LEVELS OF LIVER TRANSAMINASE LEVELS: ICD-10-CM

## 2023-03-13 PROCEDURE — 99213 OFFICE O/P EST LOW 20 MIN: CPT

## 2023-03-13 NOTE — HISTORY OF PRESENT ILLNESS
[de-identified] : Bariatric surgeon, Dr. Avendaño, Dr. Guo\par \par 32 year old F s/p bariatric surgery (MDS - modified duodenal switch)3/17/21, DEVANTE, prediabetes, anxiety, RUQ occasional pain and elevated liver enzymes.  Had recent hosp for RUQ abdl pain and nausea, work up neg for acute pathology (see below).  Here today to f/u with hepatologist, Dr. Montero.\par \par Recent [facility] (23 - 23) for RUQ pain and nausea\par MRCP 23 with no acute findings\par CT  neg for obstruction, cw mild anasarca, redemonstrated mild periportal edeam\par RUQ US neg for CBD abnormality, no gallstones\par EGD cw HH, otherwise unremarkable\par Ulcer vs obstruction was ruled out with an EGD and CT enterography. Has had 1 iron infusions and is  \par hep panel negative\par \par MRCP  2023  \par FINDINGS:\par LOWER CHEST: Within normal limits.\par LIVER: Within normal limits.\par BILE DUCTS: Normal caliber. No choledocholithiasis.\par GALLBLADDER: Cholecystectomy.\par SPLEEN: Within normal limits.\par PANCREAS: Within normal limits.\par ADRENALS: Within normal limits.\par KIDNEYS/URETERS: Within normal limits.\par BOWEL: Within normal limits.\par PERITONEUM: No ascites.\par VESSELS: Within normal limits.\par RETROPERITONEUM/LYMPH NODES: No lymphadenopathy.\par ABDOMINAL WALL: Within normal limits.\par BONES: Within normal limits.\par \par IMPRESSION:\par No acute or relevant findings to explain patient's symptoms.\par \par EGD 23\par nl esophagus with no esophagitis and no metaplastic changes at the squamocolumnar line\par gastric sleeve WNL, no stenosis.\par no ulcerations in duodenum\par duodenojejunal anastamosis - no e/o obstruction\par no abnormalities detected  \par PATH antral mucosa mild reactive gastropathy, neg HP\par \par ABDL US 23\par FINDINGS:\par Liver: Within normal limits.\par Bile ducts: Normal caliber. Common bile duct measures 7 mm.\par Gallbladder: Cholecystectomy.\par Pancreas: Visualized portions are within normal limits.\par Right kidney: 12.4 cm. 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

## 2023-03-13 NOTE — ASSESSMENT
[FreeTextEntry1] : 31 yo f s/p bariatric surgery 3/17/21 (MDS), DEVANTE, occ'l RUQ pain with elevated Liver enzymes, trending downward, here to see Dr. Montero.\par \par Does not appear to be intrinsic disease LFTs trending down\par repeat labs one month, including amylase and lipase\par weight, BMI 32 -weighed 337  now 163 - continue routine f/u and care with bariatric surgeon \par continue with Fe infusions, likely Friday, Dr. Jama\par \par RTC one month (labs prior)

## 2023-03-13 NOTE — PHYSICAL EXAM
[General Appearance - Alert] : alert [General Appearance - In No Acute Distress] : in no acute distress [PERRL With Normal Accommodation] : pupils were equal in size, round, and reactive to light [Abdomen Soft] : soft [Abdomen Tenderness] : non-tender [] : no hepato-splenomegaly [Abnormal Walk] : normal gait [Oriented To Time, Place, And Person] : oriented to person, place, and time [Scleral Icterus] : No Scleral Icterus [Spider Angioma] : No spider angioma(s) were observed [Asterixis] : no asterixis observed [Jaundice] : No jaundice [FreeTextEntry1] : abdl striae
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
## 2023-04-06 ENCOUNTER — APPOINTMENT (OUTPATIENT)
Dept: BARIATRICS | Facility: CLINIC | Age: 33
End: 2023-04-06
Payer: MEDICAID

## 2023-04-06 VITALS
HEART RATE: 69 BPM | OXYGEN SATURATION: 100 % | HEIGHT: 60 IN | SYSTOLIC BLOOD PRESSURE: 128 MMHG | DIASTOLIC BLOOD PRESSURE: 78 MMHG | WEIGHT: 167 LBS | TEMPERATURE: 97.5 F | BODY MASS INDEX: 32.79 KG/M2

## 2023-04-06 PROCEDURE — 99214 OFFICE O/P EST MOD 30 MIN: CPT

## 2023-04-06 NOTE — HISTORY OF PRESENT ILLNESS
[de-identified] : Patient is a 32 year old F s/p MDS on 3/17/21 who presents here today feeling well for follow up. She reports her average weight is 165 lbs. Her epigastric pain has subsided with strict compliance to vitamins and supplements. She is on ke fusion Vitamin A,D,E and K and receiving Fe infusion, scheduled for one more infusion. She has been eating prunes to facilitate her bowel movement and is on omeprazole qd. She reports drinking protein shakes often but has been eating protein rich food.Denies n,v,c,d, abd pain and acid reflux. States she has been exercising 3-4 times per week as well as walking and strength training through out the week. Has followed up with . She reports taking 50,000 mg vitamin D daily for which she has consulted with her PCP and has been advised to not take Vitamin D for a whole month. Patient has been advised to consult with her hematologist regarding iron supplements. Importance of maintaining a healthy lifestyle emphasized. Have ordered lab work. Follow up in 3 months.

## 2023-04-06 NOTE — ASSESSMENT
[FreeTextEntry1] : Patient is a 32 year old F s/p MDS on 3/17/21 who presents here today feeling well for follow up. She reports her average weight is 165 lbs. Her epigastric pain has subsided with strict compliance to vitamins and supplements. She is on ke fusion Vitamin A,D,E and K and receiving Fe infusion, scheduled for one more infusion. She has been eating prunes to facilitate her bowel movement and is on omeprazole qd. She reports drinking protein shakes often but has been eating protein rich food.Denies n,v,c,d, abd pain and acid reflux. States she has been exercising 3-4 times per week as well as walking and strength training through out the week. Has followed up with . She reports taking 50,000 mg vitamin D daily for which she has consulted with her PCP and has been advised to not take Vitamin D for a whole month. Patient has been advised to consult with her hematologist regarding iron supplements. Importance of maintaining a healthy lifestyle emphasized. Have ordered lab work. Follow up in 3 months.

## 2023-04-06 NOTE — PLAN
[FreeTextEntry1] : - compliance to vitamins and supplements \par - f/u with Fe infusion \par - lab work \par - F/u in 3 months

## 2023-04-06 NOTE — END OF VISIT
[Time Spent: ___ minutes] : I have spent [unfilled] minutes of time on the encounter. [FreeTextEntry3] : All medical record entries made by the Scribe were at my, STALIN Sales , direction and personally dictated by me on 04/06/2023 . I have reviewed the chart and agree that the record accurately reflects my personal performance of the history, physical exam, assessment and plan. I have also personally directed, reviewed, and agreed with the chart.\par

## 2023-04-07 ENCOUNTER — APPOINTMENT (OUTPATIENT)
Dept: INFUSION THERAPY | Facility: CLINIC | Age: 33
End: 2023-04-07

## 2023-04-07 ENCOUNTER — OUTPATIENT (OUTPATIENT)
Dept: OUTPATIENT SERVICES | Facility: HOSPITAL | Age: 33
LOS: 1 days | End: 2023-04-07
Payer: COMMERCIAL

## 2023-04-07 VITALS
HEART RATE: 66 BPM | DIASTOLIC BLOOD PRESSURE: 72 MMHG | OXYGEN SATURATION: 100 % | RESPIRATION RATE: 18 BRPM | SYSTOLIC BLOOD PRESSURE: 115 MMHG | TEMPERATURE: 99 F

## 2023-04-07 VITALS
RESPIRATION RATE: 18 BRPM | SYSTOLIC BLOOD PRESSURE: 108 MMHG | HEART RATE: 63 BPM | TEMPERATURE: 99 F | DIASTOLIC BLOOD PRESSURE: 69 MMHG | OXYGEN SATURATION: 100 %

## 2023-04-07 DIAGNOSIS — D50.9 IRON DEFICIENCY ANEMIA, UNSPECIFIED: ICD-10-CM

## 2023-04-07 DIAGNOSIS — Z98.84 BARIATRIC SURGERY STATUS: Chronic | ICD-10-CM

## 2023-04-07 DIAGNOSIS — Z98.891 HISTORY OF UTERINE SCAR FROM PREVIOUS SURGERY: Chronic | ICD-10-CM

## 2023-04-07 PROCEDURE — 96365 THER/PROPH/DIAG IV INF INIT: CPT

## 2023-04-07 RX ORDER — FERUMOXYTOL 510 MG/17ML
510 INJECTION INTRAVENOUS ONCE
Refills: 0 | Status: COMPLETED | OUTPATIENT
Start: 2023-04-07 | End: 2023-04-07

## 2023-04-07 RX ADMIN — FERUMOXYTOL 510 MILLIGRAM(S): 510 INJECTION INTRAVENOUS at 13:26

## 2023-04-07 RX ADMIN — FERUMOXYTOL 117 MILLIGRAM(S): 510 INJECTION INTRAVENOUS at 12:31

## 2023-04-12 ENCOUNTER — RX RENEWAL (OUTPATIENT)
Age: 33
End: 2023-04-12

## 2023-04-12 RX ORDER — FOLIC ACID 1 MG/1
1 TABLET ORAL
Qty: 30 | Refills: 1 | Status: ACTIVE | COMMUNITY
Start: 2023-02-09 | End: 1900-01-01

## 2023-04-13 LAB
ALBUMIN SERPL ELPH-MCNC: 4.3 G/DL
ALP BLD-CCNC: 128 U/L
ALT SERPL-CCNC: 37 U/L
AMYLASE/CREAT SERPL: 53 U/L
ANION GAP SERPL CALC-SCNC: 10 MMOL/L
AST SERPL-CCNC: 29 U/L
BASOPHILS # BLD AUTO: 0.04 K/UL
BASOPHILS NFR BLD AUTO: 0.7 %
BILIRUB SERPL-MCNC: 0.4 MG/DL
BUN SERPL-MCNC: 7 MG/DL
CALCIUM SERPL-MCNC: 9 MG/DL
CHLORIDE SERPL-SCNC: 107 MMOL/L
CO2 SERPL-SCNC: 24 MMOL/L
CREAT SERPL-MCNC: 0.7 MG/DL
EGFR: 119 ML/MIN/1.73M2
EOSINOPHIL # BLD AUTO: 0.12 K/UL
EOSINOPHIL NFR BLD AUTO: 2 %
GLUCOSE SERPL-MCNC: 79 MG/DL
HCT VFR BLD CALC: 36.3 %
HGB BLD-MCNC: 11.7 G/DL
IMM GRANULOCYTES NFR BLD AUTO: 0.3 %
LPL SERPL-CCNC: 12 U/L
LYMPHOCYTES # BLD AUTO: 1.91 K/UL
LYMPHOCYTES NFR BLD AUTO: 32.5 %
MAN DIFF?: NORMAL
MCHC RBC-ENTMCNC: 29 PG
MCHC RBC-ENTMCNC: 32.2 GM/DL
MCV RBC AUTO: 90.1 FL
MONOCYTES # BLD AUTO: 0.37 K/UL
MONOCYTES NFR BLD AUTO: 6.3 %
NEUTROPHILS # BLD AUTO: 3.42 K/UL
NEUTROPHILS NFR BLD AUTO: 58.2 %
PLATELET # BLD AUTO: 189 K/UL
POTASSIUM SERPL-SCNC: 3.9 MMOL/L
PROT SERPL-MCNC: 6.4 G/DL
RBC # BLD: 4.03 M/UL
RBC # FLD: 19.3 %
SODIUM SERPL-SCNC: 141 MMOL/L
WBC # FLD AUTO: 5.88 K/UL

## 2023-04-17 LAB
25(OH)D3 SERPL-MCNC: 24.2 NG/ML
A-TOCOPHEROL VIT E SERPL-MCNC: 6.4 MG/L
ALBUMIN SERPL ELPH-MCNC: 4.5 G/DL
ALP BLD-CCNC: 131 U/L
ALT SERPL-CCNC: 35 U/L
ANION GAP SERPL CALC-SCNC: 10 MMOL/L
APTT BLD: 35.3 SEC
AST SERPL-CCNC: 31 U/L
BASOPHILS # BLD AUTO: 0.02 K/UL
BASOPHILS NFR BLD AUTO: 0.4 %
BETA+GAMMA TOCOPHEROL SERPL-MCNC: 0.6 MG/L
BILIRUB SERPL-MCNC: 0.4 MG/DL
BUN SERPL-MCNC: 11 MG/DL
CA-I SERPL-SCNC: 4.8 MG/DL
CALCIUM SERPL-MCNC: 9 MG/DL
CALCIUM SERPL-MCNC: 9 MG/DL
CHLORIDE SERPL-SCNC: 106 MMOL/L
CHOLEST SERPL-MCNC: 139 MG/DL
CO2 SERPL-SCNC: 24 MMOL/L
CREAT SERPL-MCNC: 0.66 MG/DL
EGFR: 121 ML/MIN/1.73M2
EOSINOPHIL # BLD AUTO: 0.09 K/UL
EOSINOPHIL NFR BLD AUTO: 1.6 %
ESTIMATED AVERAGE GLUCOSE: 88 MG/DL
FOLATE SERPL-MCNC: 10.8 NG/ML
GLUCOSE SERPL-MCNC: 75 MG/DL
HBA1C MFR BLD HPLC: 4.7 %
HCT VFR BLD CALC: 37.4 %
HDLC SERPL-MCNC: 73 MG/DL
HGB BLD-MCNC: 12 G/DL
IMM GRANULOCYTES NFR BLD AUTO: 0.4 %
INR PPP: 1.03 RATIO
IRON SATN MFR SERPL: 52 %
IRON SERPL-MCNC: 161 UG/DL
LDLC SERPL CALC-MCNC: 59 MG/DL
LYMPHOCYTES # BLD AUTO: 1.92 K/UL
LYMPHOCYTES NFR BLD AUTO: 33.8 %
MAN DIFF?: NORMAL
MCHC RBC-ENTMCNC: 28.6 PG
MCHC RBC-ENTMCNC: 32.1 GM/DL
MCV RBC AUTO: 89.3 FL
MONOCYTES # BLD AUTO: 0.34 K/UL
MONOCYTES NFR BLD AUTO: 6 %
NEUTROPHILS # BLD AUTO: 3.29 K/UL
NEUTROPHILS NFR BLD AUTO: 57.8 %
NONHDLC SERPL-MCNC: 66 MG/DL
PARATHYROID HORMONE INTACT: 71 PG/ML
PLATELET # BLD AUTO: 195 K/UL
POTASSIUM SERPL-SCNC: 3.7 MMOL/L
PREALB SERPL NEPH-MCNC: 17 MG/DL
PROT SERPL-MCNC: 6.7 G/DL
PT BLD: 12.2 SEC
RBC # BLD: 4.19 M/UL
RBC # FLD: 19.2 %
SODIUM SERPL-SCNC: 140 MMOL/L
TIBC SERPL-MCNC: 311 UG/DL
TRIGL SERPL-MCNC: 34 MG/DL
TSH SERPL-ACNC: 2.13 UIU/ML
UIBC SERPL-MCNC: 150 UG/DL
VIT A SERPL-MCNC: 25.6 UG/DL
VIT B1 SERPL-MCNC: 132.6 NMOL/L
VIT B12 SERPL-MCNC: 1050 PG/ML
WBC # FLD AUTO: 5.68 K/UL
ZINC SERPL-MCNC: 77 UG/DL

## 2023-04-17 RX ORDER — ERGOCALCIFEROL 1.25 MG/1
1.25 MG CAPSULE, LIQUID FILLED ORAL
Qty: 8 | Refills: 0 | Status: ACTIVE | COMMUNITY
Start: 2023-04-17 | End: 1900-01-01

## 2023-05-01 ENCOUNTER — APPOINTMENT (OUTPATIENT)
Dept: HEPATOLOGY | Facility: CLINIC | Age: 33
End: 2023-05-01

## 2023-05-19 NOTE — H&P ADULT - NSHPREVIEWOFSYSTEMS_GEN_ALL_CORE
Constitutional: (-) fever, (-) chills  Eyes/ENT: (-) dizziness  Cardiovascular: (-) chest pain  Respiratory: (-) cough, (-) shortness of breath  Gastrointestinal: (+) abdominal pain, (+) nausea, (-) vomiting, (-) diarrhea, (+) constipation  Neurological: (-) headache Labs/Imaging Studies/Medications

## 2023-06-07 ENCOUNTER — TRANSCRIPTION ENCOUNTER (OUTPATIENT)
Age: 33
End: 2023-06-07

## 2023-07-06 ENCOUNTER — APPOINTMENT (OUTPATIENT)
Dept: BARIATRICS | Facility: CLINIC | Age: 33
End: 2023-07-06

## 2023-07-26 ENCOUNTER — NON-APPOINTMENT (OUTPATIENT)
Age: 33
End: 2023-07-26

## 2023-07-26 ENCOUNTER — APPOINTMENT (OUTPATIENT)
Dept: OBGYN | Facility: CLINIC | Age: 33
End: 2023-07-26
Payer: MEDICAID

## 2023-07-26 ENCOUNTER — LABORATORY RESULT (OUTPATIENT)
Age: 33
End: 2023-07-26

## 2023-07-26 VITALS
DIASTOLIC BLOOD PRESSURE: 60 MMHG | WEIGHT: 157 LBS | BODY MASS INDEX: 30.82 KG/M2 | HEIGHT: 60 IN | SYSTOLIC BLOOD PRESSURE: 100 MMHG

## 2023-07-26 DIAGNOSIS — Z78.9 OTHER SPECIFIED HEALTH STATUS: ICD-10-CM

## 2023-07-26 DIAGNOSIS — R10.2 PELVIC AND PERINEAL PAIN: ICD-10-CM

## 2023-07-26 DIAGNOSIS — Z82.49 FAMILY HISTORY OF ISCHEMIC HEART DISEASE AND OTHER DISEASES OF THE CIRCULATORY SYSTEM: ICD-10-CM

## 2023-07-26 DIAGNOSIS — Z83.3 FAMILY HISTORY OF DIABETES MELLITUS: ICD-10-CM

## 2023-07-26 DIAGNOSIS — Z01.419 ENCOUNTER FOR GYNECOLOGICAL EXAMINATION (GENERAL) (ROUTINE) W/OUT ABNORMAL FINDINGS: ICD-10-CM

## 2023-07-26 PROCEDURE — 81025 URINE PREGNANCY TEST: CPT

## 2023-07-26 PROCEDURE — 99395 PREV VISIT EST AGE 18-39: CPT

## 2023-07-26 NOTE — PLAN
[FreeTextEntry1] : Pap Ordered\par Aptima and serum STI screen collected and sent\par UPT- negative in office\par Urine sent for UA and reflex culture\par TVUS ordered\par Discussed Pap guidelines \par Discussed  breast awareness and mammogram guidelines\par Mammogram due at 43 yo\par Reviewed Kegels with return demonstration\par Discussed pelvic pain and possible causes including but not limited to infections, fibroids, ovarian cysts, ectopic pregnancy, appendicitis, UTI, GI issues\par Discussed testing and management with warm compress and pain relievers\par Discussed warning s/s and when to seek emergency care\par Contraception: none\par Condoms for STI prevention\par Vaginal and pelvic precautions reviewed\par F/U pending results\par RTC for routine GYN exam in one year or PRN\par \par \par

## 2023-07-26 NOTE — PHYSICAL EXAM
[Chaperone Present] : A chaperone was present in the examining room during all aspects of the physical examination [Appropriately responsive] : appropriately responsive [Alert] : alert [No Acute Distress] : no acute distress [No Lymphadenopathy] : no lymphadenopathy [Soft] : soft [Non-tender] : non-tender [Non-distended] : non-distended [No Mass] : no mass [Oriented x3] : oriented x3 [FreeTextEntry7] : no pain elicited when palpated [Examination Of The Breasts] : a normal appearance [No Discharge] : no discharge [No Masses] : no breast masses were palpable [No Lesions] : no lesions  [Labia Majora] : normal [Labia Minora] : normal [Discharge] : a  ~M vaginal discharge was present [Moderate] : moderate [Foul Smelling] : foul smelling [White] : white [Cheesy] : cheesy [No Bleeding] : There was no active vaginal bleeding [Normal] : normal [Normal Position] : in a normal position [Tenderness] : nontender [Enlarged ___ wks] : not enlarged [Mass ___ cm] : no uterine mass was palpated [Uterine Adnexae] : normal

## 2023-07-26 NOTE — REVIEW OF SYSTEMS
[Pelvic pain] : pelvic pain [Negative] : Breast [Urgency] : no urgency [Frequency] : no frequency [Incontinence] : no incontinence [Dysuria] : no dysuria [Urethral Discharge] : no urethral discharge [Abn Vaginal bleeding] : no abnormal vaginal bleeding [CVA Pain] : no CVA pain [Genital Rash/Irritation] : no genital rash/irritation

## 2023-07-26 NOTE — HISTORY OF PRESENT ILLNESS
[Patient reported PAP Smear was normal] : Patient reported PAP Smear was normal [N] : Patient does not use contraception [Monogamous (Male Partner)] : is monogamous with a male partner [Y] : Positive pregnancy history [Normal Amount/Duration] :  normal amount and duration [Regular Cycle Intervals] : periods have been regular [Currently Active] : currently active [Men] : men [No] : No [HIV Test offered] : HIV Test offered [Syphilis test offered] : Syphilis test offered [Gonorrhea test offered] : Gonorrhea test offered [Chlamydia test offered] : Chlamydia test offered [Trichomonas test offered] : Trichomonas test offered [HPV test offered] : HPV test offered [Hepatitis B test offered] : Hepatitis B test offered [Hepatitis C test offered] : Hepatitis C test offered [Localized] : localized [Patient would like to be screened for STIs] : Patient would like to be screened for STIs [PapSmeardate] : 7/2022 [TextBox_31] : Patient reports normal [LMPDate] : 7/7/23 [MensesFreq] : 28 [MensesLength] : 5 [MensesAmount] : moderate [PGxTotal] : 1 [[facility]] : 1 [Menses] : not menses [[place]] : not intercourse [Urination] : not urination [Bowel Movement] : not bowel movement [TextBox_7] : midline lower abdomen [TextBox_10] : shaggy [TextBox_13] : consistent [FreeTextEntry1] : 7/7/23

## 2023-07-26 NOTE — COUNSELING
[Breast Self Exam] : breast self exam [STD (testing, results, tx)] : STD (testing, results, tx) [Medication Management] : medication management

## 2023-07-27 ENCOUNTER — APPOINTMENT (OUTPATIENT)
Dept: ULTRASOUND IMAGING | Facility: HOSPITAL | Age: 33
End: 2023-07-27

## 2023-07-27 ENCOUNTER — OUTPATIENT (OUTPATIENT)
Dept: OUTPATIENT SERVICES | Facility: HOSPITAL | Age: 33
LOS: 1 days | End: 2023-07-27
Payer: COMMERCIAL

## 2023-07-27 DIAGNOSIS — Z98.84 BARIATRIC SURGERY STATUS: Chronic | ICD-10-CM

## 2023-07-27 DIAGNOSIS — Z98.891 HISTORY OF UTERINE SCAR FROM PREVIOUS SURGERY: Chronic | ICD-10-CM

## 2023-07-27 LAB
APPEARANCE: CLEAR
BILIRUBIN URINE: NEGATIVE
BLOOD URINE: NEGATIVE
COLOR: NORMAL
GLUCOSE QUALITATIVE U: NEGATIVE MG/DL
HBV SURFACE AG SER QL: NONREACTIVE
HCV AB SER QL: NONREACTIVE
HCV S/CO RATIO: 0.18 S/CO
HIV1+2 AB SPEC QL IA.RAPID: NONREACTIVE
HPV HIGH+LOW RISK DNA PNL CVX: NOT DETECTED
KETONES URINE: ABNORMAL MG/DL
LEUKOCYTE ESTERASE URINE: ABNORMAL
NITRITE URINE: NEGATIVE
PH URINE: 6
PROTEIN URINE: NORMAL MG/DL
SPECIFIC GRAVITY URINE: 1.03
T PALLIDUM AB SER QL IA: NEGATIVE
UROBILINOGEN URINE: 1 MG/DL

## 2023-07-27 PROCEDURE — 76830 TRANSVAGINAL US NON-OB: CPT

## 2023-07-27 PROCEDURE — 76830 TRANSVAGINAL US NON-OB: CPT | Mod: 26

## 2023-07-31 LAB
A VAGINAE DNA VAG QL NAA+PROBE: NORMAL
BVAB2 DNA VAG QL NAA+PROBE: NORMAL
C KRUSEI DNA VAG QL NAA+PROBE: NEGATIVE
C TRACH RRNA SPEC QL NAA+PROBE: NEGATIVE
CANDIDA DNA VAG QL NAA+PROBE: NEGATIVE
CYTOLOGY CVX/VAG DOC THIN PREP: NORMAL
MEGA1 DNA VAG QL NAA+PROBE: NORMAL
N GONORRHOEA RRNA SPEC QL NAA+PROBE: NEGATIVE
T VAGINALIS RRNA SPEC QL NAA+PROBE: NEGATIVE

## 2023-08-17 ENCOUNTER — APPOINTMENT (OUTPATIENT)
Dept: BARIATRICS | Facility: CLINIC | Age: 33
End: 2023-08-17
Payer: MEDICAID

## 2023-08-17 VITALS
HEART RATE: 69 BPM | HEIGHT: 60 IN | SYSTOLIC BLOOD PRESSURE: 134 MMHG | DIASTOLIC BLOOD PRESSURE: 79 MMHG | TEMPERATURE: 97.6 F | WEIGHT: 157 LBS | OXYGEN SATURATION: 97 % | BODY MASS INDEX: 30.82 KG/M2

## 2023-08-17 PROCEDURE — 99213 OFFICE O/P EST LOW 20 MIN: CPT

## 2023-08-22 NOTE — PLAN
[FreeTextEntry1] : - increase fiber intake  -start Metamucil  -continue healthy diet and exercise  -f/u in 6 months or prn if symptoms persist

## 2023-08-22 NOTE — END OF VISIT
[Time Spent: ___ minutes] : I have spent [unfilled] minutes of time on the encounter. [FreeTextEntry3] : All medical record entries made by the Scribe were at my, STALIN Sales , direction and personally dictated by me on 08/17/2023 . I have reviewed the chart and agree that the record accurately reflects my personal performance of the history, physical exam, assessment and plan. I have also personally directed, reviewed, and agreed with the chart.

## 2023-08-22 NOTE — ASSESSMENT
[FreeTextEntry1] : Patient is a 33 year old F 3 years s/p MDS in 2021 w/  and s/p cholecystectomy who presents here today for follow up. She has lost 10 lbs. since the last visit. Patient reports of at least 3 events of RUQ pain after eating regular food or italian dressing. States her last event was about 1 month ago. Denies n and v. Experiences acid reflux once in a while and states she has been constipation consistent with current Fe infusions. Has seen GYN at which time she had a US of abdomen which showed heavy stool load. Compliant on vitamins and has been maintaining her fiber and protein intake. Her diet consists of oatmeal, beans, cucumbers and fruits. Does not supplement with Metamucil. Suggest patient to start Metamucil. Labs reviewed, was prescribed with Vitamin D at the last visit. Current BMI of 30. No other concerns. Patient to start metamucil or contact the office if the pain or constipation persists. Deferred nutrition counseling at this time. Will have her return for follow up in 6 months.

## 2023-08-22 NOTE — HISTORY OF PRESENT ILLNESS
[de-identified] : Patient is a 33 year old F 3 years s/p MDS in 2021 w/  and s/p cholecystectomy who presents here today for follow up. She has lost 10 lbs. since the last visit. Patient reports of at least 3 events of RUQ pain after eating regular food or italian dressing. States her last event was about 1 month ago. Denies n and v. Experiences acid reflux once in a while and states she has been constipation consistent with current Fe infusions. Has seen GYN at which time she had a US of abdomen which showed heavy stool load. Compliant on vitamins and has been maintaining her fiber and protein intake. Her diet consists of oatmeal, beans, cucumbers and fruits. Does not supplement with Metamucil. Suggest patient to start Metamucil. Labs reviewed, was prescribed with Vitamin D at the last visit. Current BMI of 30. No other concerns. Patient to start metamucil or contact the office if the pain or constipation persists. Deferred nutrition counseling at this time. Will have her return for follow up in 6 months.

## 2023-09-07 ENCOUNTER — TRANSCRIPTION ENCOUNTER (OUTPATIENT)
Age: 33
End: 2023-09-07

## 2023-09-15 ENCOUNTER — APPOINTMENT (OUTPATIENT)
Dept: INTERNAL MEDICINE | Facility: CLINIC | Age: 33
End: 2023-09-15
Payer: COMMERCIAL

## 2023-09-15 ENCOUNTER — EMERGENCY (EMERGENCY)
Facility: HOSPITAL | Age: 33
LOS: 1 days | Discharge: HOME | End: 2023-09-15
Admitting: EMERGENCY MEDICINE
Payer: COMMERCIAL

## 2023-09-15 VITALS
SYSTOLIC BLOOD PRESSURE: 136 MMHG | RESPIRATION RATE: 18 BRPM | OXYGEN SATURATION: 100 % | WEIGHT: 154.1 LBS | DIASTOLIC BLOOD PRESSURE: 81 MMHG | HEART RATE: 61 BPM | TEMPERATURE: 98 F | HEIGHT: 64 IN

## 2023-09-15 VITALS
BODY MASS INDEX: 30.23 KG/M2 | WEIGHT: 154 LBS | DIASTOLIC BLOOD PRESSURE: 83 MMHG | TEMPERATURE: 98 F | OXYGEN SATURATION: 100 % | HEART RATE: 63 BPM | HEIGHT: 60 IN | SYSTOLIC BLOOD PRESSURE: 127 MMHG

## 2023-09-15 DIAGNOSIS — L08.9 LOCAL INFECTION OF THE SKIN AND SUBCUTANEOUS TISSUE, UNSPECIFIED: ICD-10-CM

## 2023-09-15 DIAGNOSIS — Z98.84 BARIATRIC SURGERY STATUS: Chronic | ICD-10-CM

## 2023-09-15 DIAGNOSIS — N76.4 ABSCESS OF VULVA: ICD-10-CM

## 2023-09-15 DIAGNOSIS — Z30.8 ENCOUNTER FOR OTHER CONTRACEPTIVE MANAGEMENT: ICD-10-CM

## 2023-09-15 DIAGNOSIS — Z11.3 ENCOUNTER FOR SCREENING FOR INFECTIONS WITH A PREDOMINANTLY SEXUAL MODE OF TRANSMISSION: ICD-10-CM

## 2023-09-15 DIAGNOSIS — N75.0 CYST OF BARTHOLIN'S GLAND: ICD-10-CM

## 2023-09-15 DIAGNOSIS — Z98.891 HISTORY OF UTERINE SCAR FROM PREVIOUS SURGERY: Chronic | ICD-10-CM

## 2023-09-15 DIAGNOSIS — Z87.59 PERSONAL HISTORY OF OTHER COMPLICATIONS OF PREGNANCY, CHILDBIRTH AND THE PUERPERIUM: ICD-10-CM

## 2023-09-15 LAB
HBV SURFACE AG SER-ACNC: SIGNIFICANT CHANGE UP
HCV AB S/CO SERPL IA: 0.04 S/CO — SIGNIFICANT CHANGE UP
HCV AB SERPL-IMP: SIGNIFICANT CHANGE UP
HIV 1+2 AB+HIV1 P24 AG SERPL QL IA: SIGNIFICANT CHANGE UP

## 2023-09-15 PROCEDURE — 10061 I&D ABSCESS COMP/MULTIPLE: CPT

## 2023-09-15 PROCEDURE — 87340 HEPATITIS B SURFACE AG IA: CPT

## 2023-09-15 PROCEDURE — 87591 N.GONORRHOEAE DNA AMP PROB: CPT

## 2023-09-15 PROCEDURE — 56405 I&D VULVA/PERINEAL ABSCESS: CPT

## 2023-09-15 PROCEDURE — 99285 EMERGENCY DEPT VISIT HI MDM: CPT

## 2023-09-15 PROCEDURE — 86780 TREPONEMA PALLIDUM: CPT

## 2023-09-15 PROCEDURE — 87491 CHLMYD TRACH DNA AMP PROBE: CPT

## 2023-09-15 PROCEDURE — 86803 HEPATITIS C AB TEST: CPT

## 2023-09-15 PROCEDURE — 87389 HIV-1 AG W/HIV-1&-2 AB AG IA: CPT

## 2023-09-15 PROCEDURE — 87070 CULTURE OTHR SPECIMN AEROBIC: CPT

## 2023-09-15 PROCEDURE — 36415 COLL VENOUS BLD VENIPUNCTURE: CPT

## 2023-09-15 PROCEDURE — 99213 OFFICE O/P EST LOW 20 MIN: CPT

## 2023-09-15 PROCEDURE — 99284 EMERGENCY DEPT VISIT MOD MDM: CPT | Mod: 25

## 2023-09-15 RX ORDER — CYCLOBENZAPRINE HYDROCHLORIDE 5 MG/1
5 TABLET, FILM COATED ORAL 3 TIMES DAILY
Qty: 9 | Refills: 0 | Status: DISCONTINUED | COMMUNITY
Start: 2023-01-30 | End: 2023-09-15

## 2023-09-15 RX ORDER — IBUPROFEN 200 MG
600 TABLET ORAL ONCE
Refills: 0 | Status: COMPLETED | OUTPATIENT
Start: 2023-09-15 | End: 2023-09-15

## 2023-09-15 RX ORDER — DROSPIRENONE 4 MG/1
4 TABLET, FILM COATED ORAL
Qty: 1 | Refills: 2 | Status: ACTIVE | COMMUNITY
Start: 2023-06-07 | End: 1900-01-01

## 2023-09-15 RX ADMIN — Medication 600 MILLIGRAM(S): at 11:46

## 2023-09-15 NOTE — ED PROVIDER NOTE - OBJECTIVE STATEMENT
33 F denies pmh p/w R vaginal pain/swelling x 3 days.  pt reports after shaving this weekend she noted small bump to R labia on Tuesday.  it grew throughout the day and she noted small amt pus from area but since not draining.  thinks there was an ingrown hair.  was unable to get appt w/ gyn so went to PMD today and referred to ED.  denies h/o dm.  denies f/c, HA, dizziness, chest pain, abd pain, nvd, vaginal dc/bleeding, h/o STI, trauma

## 2023-09-15 NOTE — CONSULT NOTE ADULT - SUBJECTIVE AND OBJECTIVE BOX
Pt denies fever, chills, chest pain, SOB, abdominal pain, nausea, vomiting, vaginal bleeding      OB/GYN Hx:  Last PAP smear:     PMHx:   SHx:  Meds:   Allergies:     Social hx:     PHYSICAL EXAM:   Vital Signs Last 24 Hrs  T(C): 36.6 (15 Sep 2023 11:01), Max: 36.6 (15 Sep 2023 11:01)  T(F): 97.9 (15 Sep 2023 11:01), Max: 97.9 (15 Sep 2023 11:01)  HR: 61 (15 Sep 2023 11:01) (61 - 61)  BP: 136/81 (15 Sep 2023 11:01) (136/81 - 136/81)  BP(mean): --  RR: 18 (15 Sep 2023 11:01) (18 - 18)  SpO2: 100% (15 Sep 2023 11:01) (100% - 100%)    Parameters below as of 15 Sep 2023 11:01  Patient On (Oxygen Delivery Method): room air        **************************  Constitutional: Alert & Oriented x3, No acute distress  Respiratory: Clear to ausculation bilaterally; no wheezing, rhonchi, or crackles  Cardiovascular: regular rate and rhythm, no murmurs, or gallops  Gastrointestinal: soft, non tender, positive bowel sounds, no rebound or guarding   Pelvic exam:   Extremities: no calf tenderness or swelling    LABS:                  RADIOLOGY & ADDITIONAL STUDIES: 32yo  presents to the Ed complaining of labial abscess. Pt states she noticed that she had a pimple on her vagina 3 days ago.  Pt states she has had a labial abscess before and this is similar but this is larger.  Pt states she recently had intercourse w/ a new partner on Saturday and her fiance and thinks that is what brought this on and/or shaving. Pt denies fever, chills, chest pain, SOB, abdominal pain, nausea, vomiting, vaginal bleeding      OB/GYN Hx: G1:  c/s  Last PAP smear: wnl    PMHx: denies  SHx: duodenal switch 21, cholecystectomy 21,   Meds: omperazole 40 qd  Allergies: NKDA    Social hx: sexually active w/ men and women.     PHYSICAL EXAM:   Vital Signs Last 24 Hrs  T(C): 36.6 (15 Sep 2023 11:01), Max: 36.6 (15 Sep 2023 11:01)  T(F): 97.9 (15 Sep 2023 11:), Max: 97.9 (15 Sep 2023 11:01)  HR: 61 (15 Sep 2023 11:01) (61 - 61)  BP: 136/81 (15 Sep 2023 11:01) (136/81 - 136/81)  RR: 18 (15 Sep 2023 11:01) (18 - 18)  SpO2: 100% (15 Sep 2023 11:) (100% - 100%)    Parameters below as of 15 Sep 2023 11:01  Patient On (Oxygen Delivery Method): room air        **************************  Constitutional: Alert & Oriented x3, No acute distress  Respiratory: Clear to ausculation bilaterally; no wheezing, rhonchi, or crackles  Cardiovascular: regular rate and rhythm, no murmurs, or gallops  Gastrointestinal: soft, non tender, positive bowel sounds, no rebound or guarding   Pelvic exam: R labial abscess about 3cm, incised and drained noted minimal puss and some bleeding.   Extremities: no calf tenderness or swelling    LABS:                  RADIOLOGY & ADDITIONAL STUDIES:

## 2023-09-15 NOTE — ED PROVIDER NOTE - NSFOLLOWUPINSTRUCTIONS_ED_ALL_ED_FT
Please rest and remain well hydrated with plenty of fluids.  You can take motrin 600-800mg and tylenol 650mg every 3 hours, switching between the two for pain    Please take full course of antibiotics as prescribed    You can follow up with the resident physicians at [facility], [address] by calling [phone] to schedule an appointment in one week     Abscess    An abscess is an infected area that contains a collection of pus and debris. It can occur in almost any part of the body and occurs when the tissue gets infection. Symptoms include a painful mass that is red, warm, tender that might break open and HAVE drainage. If your health care provider gave you antibiotics make sure to take the full course and do not stop even if feeling better.     SEEK IMMEDIATE MEDICAL CARE IF YOU HAVE ANY OF THE FOLLOWING SYMPTOMS: chills, fever, muscle aches, or red streaking from the area.

## 2023-09-15 NOTE — ED PROVIDER NOTE - CLINICAL SUMMARY MEDICAL DECISION MAKING FREE TEXT BOX
33 F denies pmh p/w R vaginal pain/swelling x 3 days.  on exam vss, well appearing, Pelvic: 2cm area of induration w/ overlying ttp to R labia majora extending to labia minora w/ scant purulent dc on palpation, does not extend to introitus or perineum, no extending erythema or streaking, no crepitus, no vaginal dc or bleeding.  c/w localized labial abscess.  will c/s gyn.    s/p I&D by gyn, keith w/ augmentin and outpt f/u.  also requesting STI testing so will send.  discussed strict return parameters

## 2023-09-15 NOTE — ED ADULT NURSE NOTE - OBJECTIVE STATEMENT
pt presents to ER c/o a vaginal cyst with occasional drainage for the past three days. denies fevers

## 2023-09-15 NOTE — CONSULT NOTE ADULT - ASSESSMENT
34yo  presents with R labial abscess about 3cm   -Pt had labial abscess incised w/ scalpel and Dr Keisha Beltrán at bedside.  Gave lidocaine for pain control.  Left wound open for drainage. Gave strict return precautions. Sent cx.  Pt tolerated I&D well.   -Augmentin 875 BID x10d   -Labial Abscess Culture sent  -VSS, Pt is hemodynamically stable   -Please do STD testing per pt request, HIV, RPR, Hep C/b, G/c  -Dr Alvarez in agreement w/ plan and pt will follow up w/ [facility] call [phone] in 1w

## 2023-09-15 NOTE — ED PROVIDER NOTE - PATIENT PORTAL LINK FT
You can access the FollowMyHealth Patient Portal offered by [facility] by registering at the following website: [email or web address]. By joining [facility]’s FollowMyHealth portal, you will also be able to view your health information using other applications (apps) compatible with our system.

## 2023-09-15 NOTE — ED PROVIDER NOTE - NSTIMEPROVIDERCAREINITIATE_GEN_ER
General Anesthesia, Care After  Refer to this sheet in the next few weeks. These instructions provide you with information on caring for yourself after your procedure. Your health care provider may also give you more specific instructions. Your treatment has been planned according to current medical practices, but problems sometimes occur. Call your health care provider if you have any problems or questions after your procedure.  WHAT TO EXPECT AFTER THE PROCEDURE  After the procedure, it is typical to experience:  · Sleepiness.  · Nausea and vomiting.  HOME CARE INSTRUCTIONS  · For the first 24 hours after general anesthesia:  ¨ Have a responsible person with you.  ¨ Do not drive a car. If you are alone, do not take public transportation.  ¨ Do not drink alcohol.  ¨ Do not take medicine that has not been prescribed by your health care provider.  ¨ Do not sign important papers or make important decisions.  ¨ You may resume a normal diet and activities as directed by your health care provider.  · Change bandages (dressings) as directed.  · If you have questions or problems that seem related to general anesthesia, call the hospital and ask for the anesthetist or anesthesiologist on call.  SEEK MEDICAL CARE IF:  · You have nausea and vomiting that continue the day after anesthesia.  · You develop a rash.  SEEK IMMEDIATE MEDICAL CARE IF:   · You have difficulty breathing.  · You have chest pain.  · You have any allergic problems.     This information is not intended to replace advice given to you by your health care provider. Make sure you discuss any questions you have with your health care provider.     Document Released: 03/26/2002 Document Revised: 12/23/2014 Document Reviewed: 07/03/2014  ExitCare® Patient Information ©2015 [employer].    Scopolamine skin patches  What is this medicine?  SCOPOLAMINE (skoe MICKI a meen) is used to prevent nausea and vomiting caused by motion sickness, anesthesia and surgery.   One  patch contains enough medicine to prevent motion sickness for up to 3 days.  Remove the disc after 3 days, or sooner if you no longer need it. After removing the patch, wash your hands and the area behind your ear thoroughly with soap and water. The patch will still contain some medicine after use. To avoid accidental contact or ingestion by children or pets, fold the used patch in half with the sticky side together and throw away in the trash out of the reach of children and pets.Overdosage: If you think you have taken too much of this medicine contact a poison control center or emergency room at once.  What may interact with this medicine?  -benztropine  -bethanechol  -medicines for anxiety or sleeping problems like diazepam or temazepam  -medicines for hay fever and other allergies  -medicines for mental depression  -muscle relaxants  This list may not describe all possible interactions. Give your health care provider a list of all the medicines, herbs, non-prescription drugs, or dietary supplements you use. Also tell them if you smoke, drink alcohol, or use illegal drugs. Some items may interact with your medicine.  What should I watch for while using this medicine?  Keep the patch dry, if possible, to prevent it from falling off. Limited contact with water, however, as in bathing or swimming, will not affect the system. If the patch falls off, throw it away and put a new one behind the other ear.  You may get drowsy or dizzy. Do not drive, use machinery, or do anything that needs mental alertness until you know how this medicine affects you. Do not stand or sit up quickly, especially if you are an older patient. This reduces the risk of dizzy or fainting spells. Alcohol may interfere with the effect of this medicine. Avoid alcoholic drinks.  Your mouth may get dry. Chewing sugarless gum or sucking hard candy, and drinking plenty of water may help. Contact your doctor if the problem does not go away or is  severe.  This medicine may cause dry eyes and blurred vision. If you wear contact lenses you may feel some discomfort. Lubricating drops may help. See your eye doctor if the problem does not go away or is severe.  If you are going to have a magnetic resonance imaging (MRI) procedure, tell your MRI technician if you have this patch on your body. It must be removed before a MRI.  What side effects may I notice from receiving this medicine?  Side effects that you should report to your doctor or health care professional as soon as possible:  -agitation, nervousness, confusion  -blurred vision and other eye problems  -dizziness, drowsiness  -eye pain or redness in the whites of the eye  -hallucinations  -pain or difficulty passing urine  -skin rash, itching  -vomiting  Side effects that usually do not require medical attention (report to your doctor or health care professional if they continue or are bothersome):  -headache  -nausea  This list may not describe all possible side effects. Call your doctor for medical advice about side effects. You may report side effects to FDA at 8-267-FDA-5924.    NOTE: This sheet is a summary. It may not cover all possible information. If you have questions about this medicine, talk to your doctor, pharmacist, or health care provider.     © 2015, Elsevier/Gold Standard. (2013-05-16 13:    Acetaminophen; Hydrocodone tablets or capsules  YOU HAD 7.5-325 MG AT 12:01.  What is this medicine?  ACETAMINOPHEN; HYDROCODONE (a set a BLADE carlos fen; paco droe KOE done) is a pain reliever. It is used to treat mild to moderate pain.  This medicine may be used for other purposes; ask your health care provider or pharmacist if you have questions.  COMMON BRAND NAME(S): Anexsia, Bancap HC, Ceta-Plus, Co-Gesic, Comfortpak, Dolagesic, Dolorex Forte, DuoCet, Hydrocet, Hydrogesic, Lorcet, Lorcet HD, Lorcet Plus, Lortab, Margesic H, Maxidone, Norco, Polygesic, Stagesic, Vanacet, Verdrocet, Vicodin, Vicodin ES,  Vicodin HP, Mayela Yates  What should I tell my health care provider before I take this medicine?  They need to know if you have any of these conditions:  -brain tumor  -Crohn's disease, inflammatory bowel disease, or ulcerative colitis  -drug abuse or addiction  -head injury  -heart or circulation problems  -if you often drink alcohol  -kidney disease or problems going to the bathroom  -liver disease  -lung disease, asthma, or breathing problems  -an unusual or allergic reaction to acetaminophen, hydrocodone, other opioid analgesics, other medicines, foods, dyes, or preservatives  -pregnant or trying to get pregnant  -breast-feeding  How should I use this medicine?  Take this medicine by mouth. Swallow it with a full glass of water. Follow the directions on the prescription label. If the medicine upsets your stomach, take the medicine with food or milk. Do not take more than you are told to take.  Talk to your pediatrician regarding the use of this medicine in children. This medicine is not approved for use in children.  Overdosage: If you think you have taken too much of this medicine contact a poison control center or emergency room at once.  NOTE: This medicine is only for you. Do not share this medicine with others.  What if I miss a dose?  If you miss a dose, take it as soon as you can. If it is almost time for your next dose, take only that dose. Do not take double or extra doses.  What may interact with this medicine?  -alcohol  -antihistamines  -isoniazid  -medicines for depression, anxiety, or psychotic disturbances  -medicines for sleep  -muscle relaxants  -naltrexone  -narcotic medicines (opiates) for pain  -phenobarbital  -ritonavir  -tramadol  This list may not describe all possible interactions. Give your health care provider a list of all the medicines, herbs, non-prescription drugs, or dietary supplements you use. Also tell them if you smoke, drink alcohol, or use illegal drugs. Some items may  interact with your medicine.  What should I watch for while using this medicine?  Tell your doctor or health care professional if your pain does not go away, if it gets worse, or if you have new or a different type of pain. You may develop tolerance to the medicine. Tolerance means that you will need a higher dose of the medicine for pain relief. Tolerance is normal and is expected if you take the medicine for a long time.  Do not suddenly stop taking your medicine because you may develop a severe reaction. Your body becomes used to the medicine. This does NOT mean you are addicted. Addiction is a behavior related to getting and using a drug for a non-medical reason. If you have pain, you have a medical reason to take pain medicine. Your doctor will tell you how much medicine to take. If your doctor wants you to stop the medicine, the dose will be slowly lowered over time to avoid any side effects.  You may get drowsy or dizzy when you first start taking the medicine or change doses. Do not drive, use machinery, or do anything that may be dangerous until you know how the medicine affects you. Stand or sit up slowly.  There are different types of narcotic medicines (opiates) for pain. If you take more than one type at the same time, you may have more side effects. Give your health care provider a list of all medicines you use. Your doctor will tell you how much medicine to take. Do not take more medicine than directed. Call emergency for help if you have problems breathing.  The medicine will cause constipation. Try to have a bowel movement at least every 2 to 3 days. If you do not have a bowel movement for 3 days, call your doctor or health care professional.  Too much acetaminophen can be very dangerous. Do not take Tylenol (acetaminophen) or medicines that contain acetaminophen with this medicine. Many non-prescription medicines contain acetaminophen. Always read the labels carefully.  What side effects may I  notice from receiving this medicine?  Side effects that you should report to your doctor or health care professional as soon as possible:  -allergic reactions like skin rash, itching or hives, swelling of the face, lips, or tongue  -breathing problems  -confusion  -feeling faint or lightheaded, falls  -stomach pain  -yellowing of the eyes or skin  Side effects that usually do not require medical attention (report to your doctor or health care professional if they continue or are bothersome):  -nausea, vomiting  -stomach upset  This list may not describe all possible side effects. Call your doctor for medical advice about side effects. You may report side effects to FDA at 3-065-FDA-3116.  Where should I keep my medicine?  Keep out of the reach of children. This medicine can be abused. Keep your medicine in a safe place to protect it from theft. Do not share this medicine with anyone. Selling or giving away this medicine is dangerous and against the law.  Store at room temperature between 15 and 30 degrees C (59 and 86 degrees F). Protect from light. Keep container tightly closed.  Throw away any unused medicine after the expiration date. Discard unused medicine and used packaging carefully. Pets and children can be harmed if they find used or lost packages.  NOTE: This sheet is a summary. It may not cover all possible information. If you have questions about this medicine, talk to your doctor, pharmacist, or health care provider.     © 2015, Elsevier/Gold Standard. (2014-08-11 13:15:56)    Ondansetron tablets  What is this medicine?  ONDANSETRON (on DAN se harpreet) is used to treat nausea and vomiting caused by chemotherapy. It is also used to prevent or treat nausea and vomiting after surgery.  This medicine may be used for other purposes; ask your health care provider or pharmacist if you have questions.  COMMON BRAND NAME(S): Zofran  What should I tell my health care provider before I take this medicine?  They need  to know if you have any of these conditions:  -heart disease  -history of irregular heartbeat  -liver disease  -low levels of magnesium or potassium in the blood  -an unusual or allergic reaction to ondansetron, granisetron, other medicines, foods, dyes, or preservatives  -pregnant or trying to get pregnant  -breast-feeding  How should I use this medicine?  Take this medicine by mouth with a glass of water. Follow the directions on your prescription label. Take your doses at regular intervals. Do not take your medicine more often than directed.  Talk to your pediatrician regarding the use of this medicine in children. Special care may be needed.  Overdosage: If you think you have taken too much of this medicine contact a poison control center or emergency room at once.  NOTE: This medicine is only for you. Do not share this medicine with others.  What if I miss a dose?  If you miss a dose, take it as soon as you can. If it is almost time for your next dose, take only that dose. Do not take double or extra doses.  What may interact with this medicine?  Do not take this medicine with any of the following medications:  -apomorphine  -certain medicines for fungal infections like fluconazole, itraconazole, ketoconazole, posaconazole, voriconazole  -cisapride  -dofetilide  -dronedarone  -pimozide  -thioridazine  -ziprasidone  This medicine may also interact with the following medications:  -carbamazepine  -certain medicines for depression, anxiety, or psychotic disturbances  -fentanyl  -linezolid  -MAOIs like Carbex, Eldepryl, Marplan, Nardil, and Parnate  -methylene blue (injected into a vein)  -other medicines that prolong the QT interval (cause an abnormal heart rhythm)  -phenytoin  -rifampicin  -tramadol  This list may not describe all possible interactions. Give your health care provider a list of all the medicines, herbs, non-prescription drugs, or dietary supplements you use. Also tell them if you smoke, drink  alcohol, or use illegal drugs. Some items may interact with your medicine.  What should I watch for while using this medicine?  Check with your doctor or health care professional right away if you have any sign of an allergic reaction.  What side effects may I notice from receiving this medicine?  Side effects that you should report to your doctor or health care professional as soon as possible:  -allergic reactions like skin rash, itching or hives, swelling of the face, lips or tongue  -breathing problems  -confusion  -dizziness  -fast or irregular heartbeat  -feeling faint or lightheaded, falls  -fever and chills  -loss of balance or coordination  -seizures  -sweating  -swelling of the hands or feet  -tightness in the chest  -tremors  -unusually weak or tired  Side effects that usually do not require medical attention (report to your doctor or health care professional if they continue or are bothersome):  -constipation or diarrhea  -headache  This list may not describe all possible side effects. Call your doctor for medical advice about side effects. You may report side effects to FDA at 2-276-FDA-0685.  Where should I keep my medicine?  Keep out of the reach of children.  Store between 2 and 30 degrees C (36 and 86 degrees F). Throw away any unused medicine after the expiration date.  NOTE: This sheet is a summary. It may not cover all possible information. If you have questions about this medicine, talk to your doctor, pharmacist, or health care provider.     © 2015, Elsevier/Gold Standard. (2014-09-24 16:27:45)       15-Sep-2023 11:15

## 2023-09-15 NOTE — ED ADULT NURSE NOTE - NSFALLOOBATTEMPT_ED_ALL_ED
[facility]  1/19, 9:15 am: [facility] [place] [facility]  1/20, 9 am: [place]CSF in [place] [facility] [place] [facility]  1/21, 8 am: Check-in [facility] room [facility], 9:30 am line placement procedure; back to [facility] [facility]  1/23, 9 am: Collections; 2:15 pm: follow up with AUBREY in the donor center at the [facility]  1/24, 9 am: Collections; 2:15 pm: follow up with AUBREY in the donor center at the [facility]    
No

## 2023-09-15 NOTE — ED PROVIDER NOTE - PHYSICAL EXAMINATION
Gen: well appearing, no acute distress  Skin: warm/dry, no rash noted  Resp: breathing comfortably, speaking in full sentences, no dyspnea  Abd: nondistended/soft, nontender  Pelvic: 2cm area of induration w/ overlying ttp to R labia majora extending to labia minora w/ scant purulent dc on palpation, does not extend to introitus or perineum, no extending erythema or streaking, no crepitus, no vaginal dc or bleeding  Neuro: alert/oriented, ambulatory

## 2023-09-16 LAB
C TRACH RRNA SPEC QL NAA+PROBE: SIGNIFICANT CHANGE UP
N GONORRHOEA RRNA SPEC QL NAA+PROBE: SIGNIFICANT CHANGE UP
T PALLIDUM AB TITR SER: NEGATIVE — SIGNIFICANT CHANGE UP

## 2023-09-17 LAB
CULTURE RESULTS: SIGNIFICANT CHANGE UP
SPECIMEN SOURCE: SIGNIFICANT CHANGE UP

## 2023-09-19 NOTE — ED ADULT NURSE NOTE - NS ED NURSE LEVEL OF CONSCIOUSNESS AFFECT
Calm Island Pedicle Flap With Canthal Suspension Text: In order to avoid distortion of nearby structures, an island pedicle flap was planned.  After prep and local anesthesia, a triangular incision was made.  The flap was dissected to a muscular subcutaneous pedicle.  The skin surrounding the flap was undermined to allow for closure of the donor-site defect. After hemostasis obtained, the flap was carried over into place and sutured in a layered fashion.   suspension suture was placed in the canthal tendon to prevent tension and prevent ectropion.

## 2023-09-21 NOTE — ED PROVIDER NOTE - NS ED MD EM SELECTION
[facility]    Background: Transitional Care Management program identified per system criteria and reviewed by [facility] Resource Center team for possible outreach.    Assessment: Upon chart review, [facility] Team member will not proceed with patient outreach related to this episode of Transitional Care Management program due to reason below:    Patient has a follow up appointment with an appropriate provider today for hospital discharge    Plan: Transitional Care Management episode addressed appropriately per reason noted above.      BRADEN Benavides  [facility], [facility]    *Connected Care Resource Team does NOT follow patient ongoing. Referrals are identified based on internal discharge reports and the outreach is to ensure patient has an understanding of their discharge instructions.   [id number] Comprehensive

## 2023-09-29 ENCOUNTER — TRANSCRIPTION ENCOUNTER (OUTPATIENT)
Age: 33
End: 2023-09-29

## 2023-10-02 ENCOUNTER — TRANSCRIPTION ENCOUNTER (OUTPATIENT)
Age: 33
End: 2023-10-02

## 2023-10-02 RX ORDER — OMEPRAZOLE 40 MG/1
40 CAPSULE, DELAYED RELEASE ORAL
Qty: 30 | Refills: 1 | Status: ACTIVE | COMMUNITY
Start: 2023-10-02 | End: 1900-01-01

## 2023-10-20 ENCOUNTER — APPOINTMENT (OUTPATIENT)
Dept: OBGYN | Facility: CLINIC | Age: 33
End: 2023-10-20
Payer: COMMERCIAL

## 2023-10-20 VITALS — DIASTOLIC BLOOD PRESSURE: 80 MMHG | SYSTOLIC BLOOD PRESSURE: 124 MMHG | BODY MASS INDEX: 30.08 KG/M2 | WEIGHT: 154 LBS

## 2023-10-20 DIAGNOSIS — Z92.29 PERSONAL HISTORY OF OTHER DRUG THERAPY: ICD-10-CM

## 2023-10-20 DIAGNOSIS — N89.8 OTHER SPECIFIED NONINFLAMMATORY DISORDERS OF VAGINA: ICD-10-CM

## 2023-10-20 PROCEDURE — 99213 OFFICE O/P EST LOW 20 MIN: CPT

## 2023-11-09 ENCOUNTER — APPOINTMENT (OUTPATIENT)
Dept: INTERNAL MEDICINE | Facility: CLINIC | Age: 33
End: 2023-11-09
Payer: COMMERCIAL

## 2023-11-09 DIAGNOSIS — Z00.00 ENCOUNTER FOR GENERAL ADULT MEDICAL EXAMINATION W/OUT ABNORMAL FINDINGS: ICD-10-CM

## 2023-11-09 DIAGNOSIS — R53.83 OTHER FATIGUE: ICD-10-CM

## 2023-11-09 PROCEDURE — 36415 COLL VENOUS BLD VENIPUNCTURE: CPT

## 2023-11-09 PROCEDURE — 99213 OFFICE O/P EST LOW 20 MIN: CPT | Mod: 95

## 2023-11-10 ENCOUNTER — TRANSCRIPTION ENCOUNTER (OUTPATIENT)
Age: 33
End: 2023-11-10

## 2023-11-13 PROBLEM — Z00.00 ENCOUNTER FOR PREVENTIVE HEALTH EXAMINATION: Status: ACTIVE | Noted: 2021-05-17

## 2023-11-14 ENCOUNTER — NON-APPOINTMENT (OUTPATIENT)
Age: 33
End: 2023-11-14

## 2023-11-14 ENCOUNTER — TRANSCRIPTION ENCOUNTER (OUTPATIENT)
Age: 33
End: 2023-11-14

## 2024-02-20 ENCOUNTER — APPOINTMENT (OUTPATIENT)
Dept: BARIATRICS | Facility: CLINIC | Age: 34
End: 2024-02-20

## 2024-02-26 ENCOUNTER — APPOINTMENT (OUTPATIENT)
Dept: ENDOCRINOLOGY | Facility: CLINIC | Age: 34
End: 2024-02-26

## 2024-02-29 ENCOUNTER — APPOINTMENT (OUTPATIENT)
Dept: BARIATRICS | Facility: CLINIC | Age: 34
End: 2024-02-29

## 2024-03-07 ENCOUNTER — APPOINTMENT (OUTPATIENT)
Dept: BARIATRICS | Facility: CLINIC | Age: 34
End: 2024-03-07
Payer: COMMERCIAL

## 2024-03-07 DIAGNOSIS — Z98.84 BARIATRIC SURGERY STATUS: ICD-10-CM

## 2024-03-07 PROCEDURE — 99214 OFFICE O/P EST MOD 30 MIN: CPT | Mod: 95

## 2024-03-07 RX ORDER — ONDANSETRON 8 MG/1
8 TABLET, ORALLY DISINTEGRATING ORAL EVERY 8 HOURS
Qty: 20 | Refills: 2 | Status: ACTIVE | COMMUNITY
Start: 2024-03-07 | End: 1900-01-01

## 2024-03-08 DIAGNOSIS — R79.89 OTHER SPECIFIED ABNORMAL FINDINGS OF BLOOD CHEMISTRY: ICD-10-CM

## 2024-03-08 DIAGNOSIS — E55.9 VITAMIN D DEFICIENCY, UNSPECIFIED: ICD-10-CM

## 2024-03-08 LAB
25(OH)D3 SERPL-MCNC: 13.8 NG/ML
ALBUMIN SERPL ELPH-MCNC: 4.5 G/DL
ALP BLD-CCNC: 145 U/L
ALT SERPL-CCNC: 23 U/L
ANION GAP SERPL CALC-SCNC: 13 MMOL/L
APTT BLD: 34.8 SEC
AST SERPL-CCNC: 20 U/L
BASOPHILS # BLD AUTO: 0.03 K/UL
BASOPHILS NFR BLD AUTO: 0.6 %
BILIRUB SERPL-MCNC: 0.2 MG/DL
BUN SERPL-MCNC: 12 MG/DL
CALCIUM SERPL-MCNC: 8.8 MG/DL
CALCIUM SERPL-MCNC: 8.8 MG/DL
CHLORIDE SERPL-SCNC: 108 MMOL/L
CHOLEST SERPL-MCNC: 149 MG/DL
CO2 SERPL-SCNC: 22 MMOL/L
CREAT SERPL-MCNC: 0.68 MG/DL
EGFR: 119 ML/MIN/1.73M2
EOSINOPHIL # BLD AUTO: 0.13 K/UL
EOSINOPHIL NFR BLD AUTO: 2.4 %
ESTIMATED AVERAGE GLUCOSE: 100 MG/DL
FOLATE SERPL-MCNC: 6.5 NG/ML
GLUCOSE SERPL-MCNC: 85 MG/DL
HBA1C MFR BLD HPLC: 5.1 %
HCT VFR BLD CALC: 35.3 %
HDLC SERPL-MCNC: 79 MG/DL
HGB BLD-MCNC: 11.6 G/DL
IMM GRANULOCYTES NFR BLD AUTO: 0.2 %
INR PPP: 1.03 RATIO
IRON SATN MFR SERPL: 7 %
IRON SERPL-MCNC: 30 UG/DL
LDLC SERPL CALC-MCNC: 61 MG/DL
LYMPHOCYTES # BLD AUTO: 1.96 K/UL
LYMPHOCYTES NFR BLD AUTO: 36 %
MAN DIFF?: NORMAL
MCHC RBC-ENTMCNC: 29 PG
MCHC RBC-ENTMCNC: 32.9 GM/DL
MCV RBC AUTO: 88.3 FL
MONOCYTES # BLD AUTO: 0.37 K/UL
MONOCYTES NFR BLD AUTO: 6.8 %
NEUTROPHILS # BLD AUTO: 2.95 K/UL
NEUTROPHILS NFR BLD AUTO: 54 %
NONHDLC SERPL-MCNC: 70 MG/DL
PARATHYROID HORMONE INTACT: 131 PG/ML
PLATELET # BLD AUTO: 229 K/UL
POTASSIUM SERPL-SCNC: 3.8 MMOL/L
PREALB SERPL NEPH-MCNC: 18 MG/DL
PROT SERPL-MCNC: 6.9 G/DL
PT BLD: 11.6 SEC
RBC # BLD: 4 M/UL
RBC # FLD: 13.2 %
SODIUM SERPL-SCNC: 143 MMOL/L
TIBC SERPL-MCNC: 413 UG/DL
TRIGL SERPL-MCNC: 32 MG/DL
TSH SERPL-ACNC: 1.61 UIU/ML
UIBC SERPL-MCNC: 383 UG/DL
VIT B12 SERPL-MCNC: 588 PG/ML
WBC # FLD AUTO: 5.45 K/UL

## 2024-03-08 RX ORDER — ERGOCALCIFEROL 1.25 MG/1
1.25 MG CAPSULE, LIQUID FILLED ORAL
Qty: 12 | Refills: 0 | Status: ACTIVE | COMMUNITY
Start: 2024-03-08 | End: 1900-01-01

## 2024-03-14 LAB
A-TOCOPHEROL VIT E SERPL-MCNC: 6.9 MG/L
BETA+GAMMA TOCOPHEROL SERPL-MCNC: 0.5 MG/L
CA-I SERPL-SCNC: 4.9 MG/DL
VIT A SERPL-MCNC: 24.9 UG/DL
VIT B1 SERPL-MCNC: 91.8 NMOL/L

## 2024-03-15 ENCOUNTER — NON-APPOINTMENT (OUTPATIENT)
Age: 34
End: 2024-03-15

## 2024-03-15 VITALS — HEIGHT: 60 IN | WEIGHT: 148 LBS | BODY MASS INDEX: 29.06 KG/M2

## 2024-03-21 NOTE — REASON FOR VISIT
Plastic Surgery History and Physical      SUBJECTIVE  HPI:Sahara Linton is a 66 year old female who was referred to me at the [facility] for further evaluation and treatment of a postsurgical open wound of the right breast.  Dr. Genevieve Dooley took the patient to the operating room on 2023 and performed bilateral breast lift with small reduction.  The procedure was performed at [facility].  According to the operative note, the right nipple-areola complex was noted to have significant venous congestion intraoperatively.  Postoperatively, the patient has had a nonhealing wound in the superior aspect of the right nipple-areola complex with partial loss of the right nipple-areola complex.  In addition, the patient has significant induration and firmness of the central aspect of the right breast.  She has been performing dressing changes including Santyl.  She has undergone serial wound debridement at an outside wound clinic.     Past Medical History:   Diagnosis Date    COVID-2021 AND REBOUND  - fatigue    Depression     difficulty focusing since this surgery    Hepatitis C     in past - took meds 10+  years ago - and levels normal since    History of fatigue     Open wound of skin     right breast - bid dressing changes    PONV (postoperative nausea and vomiting)     patch helped with last surgery    Snores     with a cold only     Past Surgical History:   Procedure Laterality Date    Breast reduction  2023     section, classic      x2    Cholecystectomy      Foot/toes surgery proc unlisted Right     no hardware    Hysterectomy      Total abdom hysterectomy      age 28     Prior to Admission medications    Medication Sig Start Date End Date Taking? Authorizing Provider   Premarin 0.625 MG tablet Take 0.625 mg by mouth daily.   Yes Provider, Outside   buPROPion XL (WELLBUTRIN XL) 150 MG 24 hr tablet Take 150 mg by mouth daily. 24 Yes Provider, Outside    Santyl 250 UNIT/GM ointment Apply 1 application. topically in the morning and 1 application. in the evening. Right breast   Yes Provider, Outside   escitalopram (LEXAPRO) 10 MG tablet Take 5 mg by mouth daily. 7/17/23  Yes Provider, Outside   Ascorbic Acid (vitamin C) 1000 MG tablet Take 1,000 mg by mouth 3 days a week.    Provider, Outside   VITAMIN D, CHOLECALCIFEROL, PO Take 5,000 Units by mouth daily.    Provider, Outside   electrolyte/PEG 3350 (GaviLyte-G) 236 g solution Take 4,000 mLs by mouth 1 time.    Provider, Outside   Lactobacillus Acid-Pectin (acidophilus-pectin) capsule Take 1 capsule by mouth daily.    Provider, Outside   diazePAM (VALIUM) 5 MG tablet Take 5 mg by mouth nightly as needed for Anxiety or Sleep (take half a tablet). 7/17/23   Provider, Outside   sulfamethoxazole-trimethoprim (BACTRIM DS) 800-160 MG per tablet Take 1 tablet by mouth in the morning and 1 tablet in the evening. 1/25/24 3/21/24  Provider, Outside   nitroGLYcerin (NITROSTAT) 0.3 MG sublingual tablet Place 0.3 mg under the tongue.  3/21/24  Provider, Outside   albuterol 108 (90 Base) MCG/ACT inhaler Inhale 2 puffs into the lungs every 4 hours as needed for Shortness of Breath or Wheezing.  3/21/24  Provider, Outside     No current facility-administered medications for this encounter.     Current Outpatient Medications   Medication Sig Dispense Refill    Ascorbic Acid (vitamin C) 1000 MG tablet Take 1,000 mg by mouth 3 days a week.      VITAMIN D, CHOLECALCIFEROL, PO Take 5,000 Units by mouth daily.      electrolyte/PEG 3350 (GaviLyte-G) 236 g solution Take 4,000 mLs by mouth 1 time.      Lactobacillus Acid-Pectin (acidophilus-pectin) capsule Take 1 capsule by mouth daily.      Premarin 0.625 MG tablet Take 0.625 mg by mouth daily.      buPROPion XL (WELLBUTRIN XL) 150 MG 24 hr tablet Take 150 mg by mouth daily.      Santyl 250 UNIT/GM ointment Apply 1 application. topically in the morning and 1 application. in the evening.  Right breast      diazePAM (VALIUM) 5 MG tablet Take 5 mg by mouth nightly as needed for Anxiety or Sleep (take half a tablet).      escitalopram (LEXAPRO) 10 MG tablet Take 5 mg by mouth daily.       Allergies:   ALLERGIES:   Allergen Reactions    Cat Dander Other (See Comments)     Sneezing - watery eyes - red eyes    Levaquin Other (See Comments)     Itching and swelling in the mouth    Shellfish Allergy   (Food Or Med) RASH     Alcohol:   Social History     Substance and Sexual Activity   Alcohol Use Not Currently    Comment: 2023 2 glasses wine     Social History:   Social History     Socioeconomic History    Marital status:      Spouse name: Not on file    Number of children: Not on file    Years of education: Not on file    Highest education level: Not on file   Occupational History    Not on file   Tobacco Use    Smoking status: Never    Smokeless tobacco: Never   Vaping Use    Vaping Use: never used   Substance and Sexual Activity    Alcohol use: Not Currently     Comment: 2023 2 glasses wine    Drug use: Never    Sexual activity: Not on file   Other Topics Concern    Not on file   Social History Narrative    Not on file     Social Determinants of Health     Financial Resource Strain: Not on file   Food Insecurity: Not on file   Transportation Needs: Not on file   Physical Activity: Not on file   Stress: Not on file   Social Connections: Not on file   Interpersonal Safety: Not on file     Tobacco   Social History     Tobacco Use   Smoking Status Never   Smokeless Tobacco Never     Family History: History reviewed. No pertinent family history.    Review of Systems:  Heart: No chest pain, palpitations or other cardiac complaints noted  Respiratory: No coughing, wheezing, changes in voice,  nor shortness of breath  Endocrine: Pt. denies thyroid and diabetic problems  Hematologic/Lymphatic/Immunologic: Pt. denies hematological, lymphatic and immunological problems  All other systems reviewed were  negative and non contributory    OBJECTIVE  Vitals: Visit Vitals  Ht 5' 8\" (1.727 m)   Wt 65.8 kg (145 lb)   BMI 22.05 kg/m²      Physical Exam :  Alert & orientated x3.    HEENT: Normocephalic. No masses or lesions. Conjunctivae clear, PERRL, EOM's intact. External ears normal.  Skin : Skin color, texture, turgor normal. No rashes.   Breast:  The patient has an open wound of the right breast with partial loss of the superior aspect of the right nipple-areola complex. There is significant fat necrosis present in the central aspect of the breast.     Wound Measurements          Wound Breast Right (Active)   Wound Length (cm) 2 cm 02/12/24 1000   Wound Width (cm) 3 cm 02/12/24 1000   Wound Depth (cm) 2 cm 02/12/24 1000   Wound Surface Area (cm^2) 6 cm^2 02/12/24 1000   Wound Volume (cm^3) 12 cm^3 02/12/24 1000   Number of days: 7      Photographs were obtained:               Musculoskeletal exam:  C-spine supple, no cervical tenderness, full ROM  Neuro: non-focal, CNVII intact   Heart: RRR, no murmur  Respiratory: Clear breath sounds bilateral    Lymph:  No adenopathy    Labs:    No visits with results within 30 Day(s) from this visit.   Latest known visit with results is:   Hospital Outpatient Visit on 02/06/2024   Component Date Value    CULTURE WITH GRAM STAIN,* 02/06/2024 Moderate Staphylococcus epidermidis (A)     CULTURE WITH GRAM STAIN,* 02/06/2024 Moderate Staphylococcus lugdunensis (A)     Gram Stain 02/06/2024 No polymorphonuclear cells seen.     Gram Stain 02/06/2024 No epithelial cells seen.     Gram Stain 02/06/2024 No organisms seen.       ECG:  No results found for this or any previous visit.     ASSESSMENT/PLAN: Open wound and fat necrosis, right breast, status post right breast reduction with lift.     At this point, the patient requires complete excision of the areas of fat necrosis, right breast, with subsequent secondary wound closure, extensive.  I explained to the patient that this wound will not  heal by secondary surgery.  In addition, I explained that the aesthetic contour and symmetry would be adversely affected and that the right breast would be substantially smaller than the contralateral left breast postoperatively.  The patient understands the consequences of surgery but wishes to be completely healed at this time.     Insurance authorization for this procedure was obtained.  Surgery is scheduled at [facility] under general anesthesia in the outpatient setting on Friday, March 22, 2024.        Richard Dailey MD     [Initial Consultation] : an initial consultation for [Blood Count Assessment] : blood count assessment

## 2024-04-19 PROBLEM — Z98.84 S/P BILIOPANCREATIC DIVERSION WITH DUODENAL SWITCH: Status: ACTIVE | Noted: 2024-04-19

## 2024-04-19 PROBLEM — Z98.84 S/P BILIOPANCREATIC DIVERSION WITH DUODENAL SWITCH: Status: RESOLVED | Noted: 2021-10-31 | Resolved: 2024-04-19

## 2024-04-22 NOTE — HISTORY OF PRESENT ILLNESS
[Home] : at home, [unfilled] , at the time of the visit. [Medical Office: ([facility])___] : at the medical office located in  [Verbal consent obtained from patient] : the patient, [unfilled] [de-identified] : Patient is a 33 year old F 3 years s/p MDS in 2021 w/  and s/p cholecystectomy who presents here today for follow up. Her BMI today is 30 and has maintained her weight os 154 since her last visit. She had previous symptoms of constipation and RUQ pain which have since resolved, but today complains of nausea. Other than that, she is doing really well from a bariatric standpoint and denies n/v/c/d/ SOB, acid reflux, po intolerance, chest pain, abd pain, dizziness, fever and calf pain. She has no other concerns at this time, will be prescribed Zolfran, get labs, and follow up thereafter.

## 2024-04-22 NOTE — END OF VISIT
[Time Spent: ___ minutes] : I have spent [unfilled] minutes of time on the encounter. [FreeTextEntry3] :  All medical record entries made by the Scribe were at my, STALIN Sales , direction and personally dictated by me on 03/07/2024 . I have reviewed the chart and agree that the record accurately reflects my personal performance of the history, physical exam, assessment and plan. I have also personally directed, reviewed, and agreed with the chart.

## 2024-04-22 NOTE — ASSESSMENT
[FreeTextEntry1] : Patient is a 33 year old F 3 years s/p MDS in 2021 w/  and s/p cholecystectomy who presents here today for follow up. Her BMI today is 30 and has maintained her weight os 154 since her last visit. She had previous symptoms of constipation and RUQ pain which have since resolved, but today complains of nausea. Other than that, she is doing really well from a bariatric standpoint and denies n/v/c/d/ SOB, acid reflux, po intolerance, chest pain, abd pain, dizziness, fever and calf pain. She has no other concerns at this time, will be prescribed Zolfran, get labs, and follow up thereafter.

## 2024-07-11 ENCOUNTER — APPOINTMENT (OUTPATIENT)
Dept: INTERNAL MEDICINE | Facility: CLINIC | Age: 34
End: 2024-07-11

## 2024-08-22 ENCOUNTER — EMERGENCY (EMERGENCY)
Facility: HOSPITAL | Age: 34
LOS: 1 days | Discharge: HOME | End: 2024-08-22
Attending: EMERGENCY MEDICINE | Admitting: EMERGENCY MEDICINE
Payer: COMMERCIAL

## 2024-08-22 VITALS
DIASTOLIC BLOOD PRESSURE: 82 MMHG | HEART RATE: 78 BPM | OXYGEN SATURATION: 99 % | RESPIRATION RATE: 15 BRPM | TEMPERATURE: 99 F | WEIGHT: 160.06 LBS | SYSTOLIC BLOOD PRESSURE: 123 MMHG

## 2024-08-22 DIAGNOSIS — Z98.891 HISTORY OF UTERINE SCAR FROM PREVIOUS SURGERY: Chronic | ICD-10-CM

## 2024-08-22 DIAGNOSIS — Z98.84 BARIATRIC SURGERY STATUS: Chronic | ICD-10-CM

## 2024-08-22 DIAGNOSIS — M54.50 LOW BACK PAIN, UNSPECIFIED: ICD-10-CM

## 2024-08-22 PROCEDURE — 99284 EMERGENCY DEPT VISIT MOD MDM: CPT

## 2024-08-22 PROCEDURE — 99283 EMERGENCY DEPT VISIT LOW MDM: CPT

## 2024-08-22 RX ORDER — CYCLOBENZAPRINE HYDROCHLORIDE 15 MG/1
1 CAPSULE, EXTENDED RELEASE ORAL
Qty: 15 | Refills: 0
Start: 2024-08-22 | End: 2024-08-26

## 2024-08-22 RX ORDER — LIDOCAINE HYDROCHLORIDE 20 MG/ML
1 JELLY TOPICAL
Qty: 3 | Refills: 0
Start: 2024-08-22

## 2024-09-13 NOTE — ED ADULT NURSE NOTE - CAS TRG GENERAL AIRWAY, MLM
RECORDS RECEIVED FROM: internal    DATE RECEIVED: 9.19.24    NOTES (FOR ALL VISITS) STATUS DETAILS   OFFICE NOTES from referring provider internal    Randall Reina PA-C      MEDICATION LIST internal     IMAGING      DEXASCAN internal  6.5.24,    XR (Chest) internal  6.10.24, 3.8.24, 2.29.24, 1.1.24, 11.4.22   CT (HEAD/NECK/CHEST/ABDOMEN) internal  7/10/24, 1.4.24, 9.18.23, 11.15.22    [place]- 9.13.23     ULTRASOUND (HEAD/NECK) [facility]- - 10.28.22    LABS     DIABETES: HBGA1C, CREATININE, FASTING LIPIDS, MICROALBUMIN URINE, POTASSIUM, TSH, T4    THYROID: TSH, T4, CBC, THYRODLONULIN, TOTAL T3, FREE T4, CALCITONIN, CEA internal          Action    Action Taken Image request sent to [facility] for   US thyroid - 10.28.22           Patent

## 2024-10-10 NOTE — ED ADULT NURSE NOTE - CAS TRG GEN SKIN CONDITION
Called heart failure clinic to clarify echo order with Elizabeth Hussein and staff. Directed to keep the order as a complete echo.  
Warm

## 2024-10-31 ENCOUNTER — NON-APPOINTMENT (OUTPATIENT)
Age: 34
End: 2024-10-31

## 2024-10-31 ENCOUNTER — MED ADMIN CHARGE (OUTPATIENT)
Age: 34
End: 2024-10-31

## 2024-10-31 ENCOUNTER — APPOINTMENT (OUTPATIENT)
Dept: INTERNAL MEDICINE | Facility: CLINIC | Age: 34
End: 2024-10-31
Payer: COMMERCIAL

## 2024-10-31 VITALS
HEIGHT: 60 IN | OXYGEN SATURATION: 99 % | HEART RATE: 81 BPM | BODY MASS INDEX: 31.41 KG/M2 | SYSTOLIC BLOOD PRESSURE: 130 MMHG | WEIGHT: 160 LBS | TEMPERATURE: 98.6 F | DIASTOLIC BLOOD PRESSURE: 77 MMHG

## 2024-10-31 DIAGNOSIS — Z01.818 ENCOUNTER FOR OTHER PREPROCEDURAL EXAMINATION: ICD-10-CM

## 2024-10-31 DIAGNOSIS — R03.0 ELEVATED BLOOD-PRESSURE READING, W/OUT DIAGNOSIS OF HYPERTENSION: ICD-10-CM

## 2024-10-31 DIAGNOSIS — Z23 ENCOUNTER FOR IMMUNIZATION: ICD-10-CM

## 2024-10-31 DIAGNOSIS — Z92.29 PERSONAL HISTORY OF OTHER DRUG THERAPY: ICD-10-CM

## 2024-10-31 DIAGNOSIS — Z86.2 PERSONAL HISTORY OF DISEASES OF THE BLOOD AND BLOOD-FORMING ORGANS AND CERTAIN DISORDERS INVOLVING THE IMMUNE MECHANISM: ICD-10-CM

## 2024-10-31 DIAGNOSIS — Z00.01 ENCOUNTER FOR GENERAL ADULT MEDICAL EXAMINATION WITH ABNORMAL FINDINGS: ICD-10-CM

## 2024-10-31 DIAGNOSIS — Z87.39 PERSONAL HISTORY OF OTHER DISEASES OF THE MUSCULOSKELETAL SYSTEM AND CONNECTIVE TISSUE: ICD-10-CM

## 2024-10-31 DIAGNOSIS — Z00.00 ENCOUNTER FOR GENERAL ADULT MEDICAL EXAMINATION W/OUT ABNORMAL FINDINGS: ICD-10-CM

## 2024-10-31 PROCEDURE — 99395 PREV VISIT EST AGE 18-39: CPT | Mod: 25

## 2024-10-31 PROCEDURE — 90471 IMMUNIZATION ADMIN: CPT

## 2024-10-31 PROCEDURE — 90715 TDAP VACCINE 7 YRS/> IM: CPT

## 2024-10-31 PROCEDURE — G0444 DEPRESSION SCREEN ANNUAL: CPT | Mod: 59

## 2024-10-31 PROCEDURE — 99213 OFFICE O/P EST LOW 20 MIN: CPT | Mod: 25

## 2024-10-31 RX ORDER — IRON FUM,POLYSAC/VIT C/L.CASEI 130-25-30
CAPSULE ORAL 4 TIMES DAILY
Refills: 0 | Status: ACTIVE | COMMUNITY

## 2024-10-31 RX ORDER — SPIRONOLACTONE 25 MG/1
25 TABLET ORAL TWICE DAILY
Refills: 0 | Status: ACTIVE | COMMUNITY

## 2024-11-04 DIAGNOSIS — Q66.89 OTHER SPECIFIED CONGENITAL DEFORMITIES OF FEET: ICD-10-CM

## 2024-11-04 RX ORDER — FOLIC ACID 1 MG/1
1 TABLET ORAL DAILY
Qty: 90 | Refills: 1 | Status: ACTIVE | COMMUNITY
Start: 2024-11-04 | End: 1900-01-01

## 2024-11-05 ENCOUNTER — NON-APPOINTMENT (OUTPATIENT)
Age: 34
End: 2024-11-05

## 2024-11-05 ENCOUNTER — TRANSCRIPTION ENCOUNTER (OUTPATIENT)
Age: 34
End: 2024-11-05

## 2024-11-07 ENCOUNTER — APPOINTMENT (OUTPATIENT)
Dept: INTERNAL MEDICINE | Facility: CLINIC | Age: 34
End: 2024-11-07
Payer: COMMERCIAL

## 2024-11-07 VITALS
HEIGHT: 60 IN | WEIGHT: 160 LBS | SYSTOLIC BLOOD PRESSURE: 113 MMHG | HEART RATE: 76 BPM | BODY MASS INDEX: 31.41 KG/M2 | OXYGEN SATURATION: 100 % | DIASTOLIC BLOOD PRESSURE: 72 MMHG | TEMPERATURE: 98.1 F

## 2024-11-07 DIAGNOSIS — M20.41 OTHER HAMMER TOE(S) (ACQUIRED), RIGHT FOOT: ICD-10-CM

## 2024-11-07 DIAGNOSIS — E53.8 DEFICIENCY OF OTHER SPECIFIED B GROUP VITAMINS: ICD-10-CM

## 2024-11-07 DIAGNOSIS — D50.9 IRON DEFICIENCY ANEMIA, UNSPECIFIED: ICD-10-CM

## 2024-11-07 DIAGNOSIS — M20.42 OTHER HAMMER TOE(S) (ACQUIRED), LEFT FOOT: ICD-10-CM

## 2024-11-07 PROCEDURE — G2211 COMPLEX E/M VISIT ADD ON: CPT

## 2024-11-07 PROCEDURE — 99213 OFFICE O/P EST LOW 20 MIN: CPT | Mod: GC

## 2024-11-08 ENCOUNTER — TRANSCRIPTION ENCOUNTER (OUTPATIENT)
Age: 34
End: 2024-11-08

## 2024-11-08 ENCOUNTER — NON-APPOINTMENT (OUTPATIENT)
Age: 34
End: 2024-11-08

## 2024-11-08 LAB
24R-OH-CALCIDIOL SERPL-MCNC: 99.7 PG/ML
ALBUMIN SERPL ELPH-MCNC: 4.2 G/DL
ALP BLD-CCNC: 142 U/L
ALT SERPL-CCNC: 23 U/L
ANION GAP SERPL CALC-SCNC: 12 MMOL/L
AST SERPL-CCNC: 27 U/L
BASOPHILS # BLD AUTO: 0.04 K/UL
BASOPHILS NFR BLD AUTO: 0.8 %
BILIRUB SERPL-MCNC: 0.3 MG/DL
BUN SERPL-MCNC: 12 MG/DL
C TRACH RRNA SPEC QL NAA+PROBE: NOT DETECTED
CALCIUM SERPL-MCNC: 9 MG/DL
CHLORIDE SERPL-SCNC: 106 MMOL/L
CHOLEST SERPL-MCNC: 159 MG/DL
CO2 SERPL-SCNC: 23 MMOL/L
CREAT SERPL-MCNC: 0.7 MG/DL
EGFR: 118 ML/MIN/1.73M2
EOSINOPHIL # BLD AUTO: 0.13 K/UL
EOSINOPHIL NFR BLD AUTO: 2.6 %
FERRITIN SERPL-MCNC: 5 NG/ML
FOLATE SERPL-MCNC: 3.1 NG/ML
GLUCOSE SERPL-MCNC: 81 MG/DL
HCT VFR BLD CALC: 32.8 %
HDLC SERPL-MCNC: 76 MG/DL
HGB BLD-MCNC: 9.5 G/DL
HIV1+2 AB SPEC QL IA.RAPID: NONREACTIVE
IMM GRANULOCYTES NFR BLD AUTO: 0.4 %
IRON SATN MFR SERPL: 4 %
IRON SERPL-MCNC: 18 UG/DL
LDLC SERPL CALC-MCNC: 73 MG/DL
LYMPHOCYTES # BLD AUTO: 1.72 K/UL
LYMPHOCYTES NFR BLD AUTO: 34.5 %
MAN DIFF?: NORMAL
MCHC RBC-ENTMCNC: 23.5 PG
MCHC RBC-ENTMCNC: 29 G/DL
MCV RBC AUTO: 81.2 FL
MONOCYTES # BLD AUTO: 0.35 K/UL
MONOCYTES NFR BLD AUTO: 7 %
N GONORRHOEA RRNA SPEC QL NAA+PROBE: NOT DETECTED
NEUTROPHILS # BLD AUTO: 2.72 K/UL
NEUTROPHILS NFR BLD AUTO: 54.7 %
NONHDLC SERPL-MCNC: 82 MG/DL
PLATELET # BLD AUTO: 303 K/UL
POTASSIUM SERPL-SCNC: 4.1 MMOL/L
PROT SERPL-MCNC: 6.9 G/DL
RBC # BLD: 4.04 M/UL
RBC # FLD: 17.2 %
SODIUM SERPL-SCNC: 141 MMOL/L
SOURCE AMPLIFICATION: NORMAL
TIBC SERPL-MCNC: 488 UG/DL
TRIGL SERPL-MCNC: 43 MG/DL
TSH SERPL-ACNC: 1.76 UIU/ML
UIBC SERPL-MCNC: 470 UG/DL
VIT B12 SERPL-MCNC: 664 PG/ML
WBC # FLD AUTO: 4.98 K/UL

## 2024-11-26 ENCOUNTER — APPOINTMENT (OUTPATIENT)
Dept: HEMATOLOGY ONCOLOGY | Facility: CLINIC | Age: 34
End: 2024-11-26
Payer: COMMERCIAL

## 2024-11-26 VITALS
RESPIRATION RATE: 18 BRPM | OXYGEN SATURATION: 98 % | SYSTOLIC BLOOD PRESSURE: 129 MMHG | TEMPERATURE: 98.2 F | WEIGHT: 160 LBS | HEART RATE: 95 BPM | DIASTOLIC BLOOD PRESSURE: 78 MMHG | HEIGHT: 60 IN | BODY MASS INDEX: 31.41 KG/M2

## 2024-11-26 DIAGNOSIS — Z98.84 BARIATRIC SURGERY STATUS: ICD-10-CM

## 2024-11-26 DIAGNOSIS — D50.9 IRON DEFICIENCY ANEMIA, UNSPECIFIED: ICD-10-CM

## 2024-11-26 LAB
HCT VFR BLD CALC: 29.4 %
HGB BLD-MCNC: 9 G/DL
LYMPHOCYTES # BLD AUTO: 1.9 K/UL
LYMPHOCYTES NFR BLD AUTO: 39.6 %
MAN DIFF?: NO
MCHC RBC-ENTMCNC: 23.4 PG
MCHC RBC-ENTMCNC: 30.6 G/DL
MCV RBC AUTO: 76.4 FL
NEUTROPHILS # BLD AUTO: 2.5 K/UL
NEUTROPHILS NFR BLD AUTO: 53 %
PLATELET # BLD AUTO: 266 K/UL
RBC # BLD: 3.85 M/UL
RBC # FLD: 17.1 %
WBC # FLD AUTO: 4.8 K/UL

## 2024-11-26 PROCEDURE — 99213 OFFICE O/P EST LOW 20 MIN: CPT

## 2024-11-27 LAB
FERRITIN SERPL-MCNC: 7 NG/ML
IRON SATN MFR SERPL: 4 %
IRON SERPL-MCNC: 15 UG/DL
TIBC SERPL-MCNC: 416 UG/DL
UIBC SERPL-MCNC: 401 UG/DL

## 2024-12-06 ENCOUNTER — APPOINTMENT (OUTPATIENT)
Dept: OTOLARYNGOLOGY | Facility: CLINIC | Age: 34
End: 2024-12-06

## 2024-12-19 ENCOUNTER — APPOINTMENT (OUTPATIENT)
Dept: INFUSION THERAPY | Facility: CLINIC | Age: 34
End: 2024-12-19

## 2024-12-19 ENCOUNTER — OUTPATIENT (OUTPATIENT)
Dept: OUTPATIENT SERVICES | Facility: HOSPITAL | Age: 34
LOS: 1 days | End: 2024-12-19
Payer: COMMERCIAL

## 2024-12-19 VITALS
WEIGHT: 160.06 LBS | TEMPERATURE: 98 F | HEIGHT: 60 IN | HEART RATE: 86 BPM | DIASTOLIC BLOOD PRESSURE: 72 MMHG | RESPIRATION RATE: 18 BRPM | SYSTOLIC BLOOD PRESSURE: 121 MMHG | OXYGEN SATURATION: 99 %

## 2024-12-19 DIAGNOSIS — Z98.891 HISTORY OF UTERINE SCAR FROM PREVIOUS SURGERY: Chronic | ICD-10-CM

## 2024-12-19 DIAGNOSIS — Z98.84 BARIATRIC SURGERY STATUS: Chronic | ICD-10-CM

## 2024-12-19 PROCEDURE — 96365 THER/PROPH/DIAG IV INF INIT: CPT

## 2024-12-19 RX ORDER — IRON SUCROSE 20 MG/ML
200 INJECTION, SOLUTION INTRAVENOUS ONCE
Refills: 0 | Status: COMPLETED | OUTPATIENT
Start: 2024-12-19 | End: 2024-12-19

## 2024-12-19 RX ADMIN — IRON SUCROSE 220 MILLIGRAM(S): 20 INJECTION, SOLUTION INTRAVENOUS at 13:35

## 2024-12-26 ENCOUNTER — OUTPATIENT (OUTPATIENT)
Dept: OUTPATIENT SERVICES | Facility: HOSPITAL | Age: 34
LOS: 1 days | End: 2024-12-26
Payer: COMMERCIAL

## 2024-12-26 ENCOUNTER — APPOINTMENT (OUTPATIENT)
Dept: INFUSION THERAPY | Facility: CLINIC | Age: 34
End: 2024-12-26

## 2024-12-26 VITALS
HEART RATE: 78 BPM | DIASTOLIC BLOOD PRESSURE: 77 MMHG | WEIGHT: 164.91 LBS | SYSTOLIC BLOOD PRESSURE: 121 MMHG | HEIGHT: 60 IN | TEMPERATURE: 98 F | OXYGEN SATURATION: 100 % | RESPIRATION RATE: 18 BRPM

## 2024-12-26 VITALS
RESPIRATION RATE: 18 BRPM | SYSTOLIC BLOOD PRESSURE: 118 MMHG | TEMPERATURE: 98 F | DIASTOLIC BLOOD PRESSURE: 78 MMHG | OXYGEN SATURATION: 99 % | HEART RATE: 65 BPM

## 2024-12-26 DIAGNOSIS — Z98.891 HISTORY OF UTERINE SCAR FROM PREVIOUS SURGERY: Chronic | ICD-10-CM

## 2024-12-26 DIAGNOSIS — Z98.84 BARIATRIC SURGERY STATUS: Chronic | ICD-10-CM

## 2024-12-26 DIAGNOSIS — D50.9 IRON DEFICIENCY ANEMIA, UNSPECIFIED: ICD-10-CM

## 2024-12-26 PROCEDURE — 96365 THER/PROPH/DIAG IV INF INIT: CPT

## 2024-12-26 RX ORDER — IRON SUCROSE 20 MG/ML
200 INJECTION, SOLUTION INTRAVENOUS ONCE
Refills: 0 | Status: COMPLETED | OUTPATIENT
Start: 2024-12-26 | End: 2024-12-26

## 2024-12-26 RX ADMIN — IRON SUCROSE 220 MILLIGRAM(S): 20 INJECTION, SOLUTION INTRAVENOUS at 13:40

## 2024-12-26 RX ADMIN — IRON SUCROSE 200 MILLIGRAM(S): 20 INJECTION, SOLUTION INTRAVENOUS at 14:10

## 2025-01-02 ENCOUNTER — APPOINTMENT (OUTPATIENT)
Dept: INFUSION THERAPY | Facility: CLINIC | Age: 35
End: 2025-01-02

## 2025-01-03 ENCOUNTER — APPOINTMENT (OUTPATIENT)
Dept: INTERNAL MEDICINE | Facility: CLINIC | Age: 35
End: 2025-01-03

## 2025-01-21 ENCOUNTER — APPOINTMENT (OUTPATIENT)
Dept: INTERNAL MEDICINE | Facility: CLINIC | Age: 35
End: 2025-01-21
Payer: COMMERCIAL

## 2025-01-21 VITALS
OXYGEN SATURATION: 100 % | SYSTOLIC BLOOD PRESSURE: 123 MMHG | DIASTOLIC BLOOD PRESSURE: 75 MMHG | TEMPERATURE: 97.8 F | WEIGHT: 162 LBS | HEIGHT: 60 IN | HEART RATE: 72 BPM | BODY MASS INDEX: 31.8 KG/M2

## 2025-01-21 DIAGNOSIS — D50.9 IRON DEFICIENCY ANEMIA, UNSPECIFIED: ICD-10-CM

## 2025-01-21 DIAGNOSIS — R73.03 PREDIABETES.: ICD-10-CM

## 2025-01-21 DIAGNOSIS — E66.9 OBESITY, UNSPECIFIED: ICD-10-CM

## 2025-01-21 DIAGNOSIS — L70.9 ACNE, UNSPECIFIED: ICD-10-CM

## 2025-01-21 DIAGNOSIS — Z11.3 ENCOUNTER FOR SCREENING FOR INFECTIONS WITH A PREDOMINANTLY SEXUAL MODE OF TRANSMISSION: ICD-10-CM

## 2025-01-21 PROCEDURE — 99214 OFFICE O/P EST MOD 30 MIN: CPT | Mod: GC,25

## 2025-01-22 LAB
HBV SURFACE AB SER QL: REACTIVE
HBV SURFACE AG SER QL: NONREACTIVE
HCT VFR BLD CALC: 33.4 %
HCV AB SER QL: NONREACTIVE
HCV S/CO RATIO: 0.2 S/CO
HGB BLD-MCNC: 10.2 G/DL
HIV1+2 AB SPEC QL IA.RAPID: NONREACTIVE
MCHC RBC-ENTMCNC: 24.4 PG
MCHC RBC-ENTMCNC: 30.5 G/DL
MCV RBC AUTO: 79.9 FL
PLATELET # BLD AUTO: 248 K/UL
RBC # BLD: 4.18 M/UL
RBC # FLD: 21.9 %
T PALLIDUM AB SER QL IA: NEGATIVE
WBC # FLD AUTO: 6.3 K/UL

## 2025-01-23 ENCOUNTER — NON-APPOINTMENT (OUTPATIENT)
Age: 35
End: 2025-01-23

## 2025-02-07 ENCOUNTER — APPOINTMENT (OUTPATIENT)
Dept: INFUSION THERAPY | Facility: CLINIC | Age: 35
End: 2025-02-07

## 2025-02-07 ENCOUNTER — OUTPATIENT (OUTPATIENT)
Dept: OUTPATIENT SERVICES | Facility: HOSPITAL | Age: 35
LOS: 1 days | End: 2025-02-07

## 2025-02-07 DIAGNOSIS — Z98.891 HISTORY OF UTERINE SCAR FROM PREVIOUS SURGERY: Chronic | ICD-10-CM

## 2025-02-07 DIAGNOSIS — Z98.84 BARIATRIC SURGERY STATUS: Chronic | ICD-10-CM

## 2025-02-07 DIAGNOSIS — D50.9 IRON DEFICIENCY ANEMIA, UNSPECIFIED: ICD-10-CM

## 2025-04-18 NOTE — ED PROVIDER NOTE - NSICDXPASTSURGICALHX_GEN_ALL_CORE_FT
Mich Ames is calling to request a refill on the following medication(s):    Medication Request:  Requested Prescriptions     Pending Prescriptions Disp Refills    amphetamine-dextroamphetamine (ADDERALL, 15MG,) 15 MG tablet 30 tablet 0     Sig: Take 1 tablet by mouth daily for 30 days. Max Daily Amount: 15 mg       Last Visit Date (If Applicable):  1/31/2025    Next Visit Date:    7/28/2025               PAST SURGICAL HISTORY:  H/O:      S/P biliopancreatic diversion with duodenal switch

## 2025-07-11 ENCOUNTER — APPOINTMENT (OUTPATIENT)
Dept: INTERNAL MEDICINE | Facility: CLINIC | Age: 35
End: 2025-07-11
Payer: COMMERCIAL

## 2025-07-11 VITALS
OXYGEN SATURATION: 100 % | SYSTOLIC BLOOD PRESSURE: 115 MMHG | TEMPERATURE: 98.1 F | HEIGHT: 60 IN | HEART RATE: 83 BPM | DIASTOLIC BLOOD PRESSURE: 77 MMHG | WEIGHT: 180 LBS | BODY MASS INDEX: 35.34 KG/M2

## 2025-07-11 PROBLEM — R51.9 HEADACHE: Status: ACTIVE | Noted: 2025-07-11

## 2025-07-11 PROCEDURE — 99395 PREV VISIT EST AGE 18-39: CPT | Mod: 25

## 2025-07-14 ENCOUNTER — NON-APPOINTMENT (OUTPATIENT)
Age: 35
End: 2025-07-14

## 2025-07-15 ENCOUNTER — NON-APPOINTMENT (OUTPATIENT)
Age: 35
End: 2025-07-15

## 2025-07-15 LAB
FERRITIN SERPL-MCNC: 4 NG/ML
HCT VFR BLD CALC: 29.5 %
HGB BLD-MCNC: 8.5 G/DL
IRON SATN MFR SERPL: 4 %
IRON SERPL-MCNC: 16 UG/DL
MCHC RBC-ENTMCNC: 22.4 PG
MCHC RBC-ENTMCNC: 28.8 G/DL
MCV RBC AUTO: 77.8 FL
PLATELET # BLD AUTO: 295 K/UL
RBC # BLD: 3.79 M/UL
RBC # FLD: 17.3 %
TIBC SERPL-MCNC: 396 UG/DL
UIBC SERPL-MCNC: 381 UG/DL
WBC # FLD AUTO: 5.13 K/UL

## 2025-09-08 DIAGNOSIS — R00.2 PALPITATIONS: ICD-10-CM

## (undated) DEVICE — FORCEP RADIAL JAW 4 W NDL 2.2MM 2.8MM 240CM ORANGE DISP